# Patient Record
Sex: FEMALE | Race: WHITE | Employment: FULL TIME | URBAN - METROPOLITAN AREA
[De-identification: names, ages, dates, MRNs, and addresses within clinical notes are randomized per-mention and may not be internally consistent; named-entity substitution may affect disease eponyms.]

---

## 2017-02-24 ENCOUNTER — OFFICE VISIT (OUTPATIENT)
Dept: FAMILY MEDICINE CLINIC | Age: 57
End: 2017-02-24

## 2017-02-24 VITALS
HEIGHT: 67 IN | WEIGHT: 233 LBS | OXYGEN SATURATION: 97 % | DIASTOLIC BLOOD PRESSURE: 72 MMHG | HEART RATE: 75 BPM | RESPIRATION RATE: 18 BRPM | BODY MASS INDEX: 36.57 KG/M2 | TEMPERATURE: 98.2 F | SYSTOLIC BLOOD PRESSURE: 130 MMHG

## 2017-02-24 DIAGNOSIS — E78.00 HYPERCHOLESTEROLEMIA: ICD-10-CM

## 2017-02-24 DIAGNOSIS — E11.9 DIABETES MELLITUS TYPE 2, NONINSULIN DEPENDENT (HCC): Primary | ICD-10-CM

## 2017-02-24 PROBLEM — M77.50 BONE SPUR OF FOOT: Status: ACTIVE | Noted: 2017-02-24

## 2017-02-24 RX ORDER — GLUCOSAMINE SULFATE 1500 MG
1000 POWDER IN PACKET (EA) ORAL
COMMUNITY
End: 2019-01-09

## 2017-02-24 RX ORDER — LANOLIN ALCOHOL/MO/W.PET/CERES
1000 CREAM (GRAM) TOPICAL DAILY
COMMUNITY
End: 2017-08-17

## 2017-02-24 NOTE — PATIENT INSTRUCTIONS
Diabetes Foot Health: Care Instructions  Your Care Instructions    When you have diabetes, your feet need extra care and attention. Diabetes can damage the nerve endings and blood vessels in your feet, making you less likely to notice when your feet are injured. Diabetes also limits your body's ability to fight infection and get blood to areas that need it. If you get a minor foot injury, it could become an ulcer or a serious infection. With good foot care, you can prevent most of these problems. Caring for your feet can be quick and easy. Most of the care can be done when you are bathing or getting ready for bed. Follow-up care is a key part of your treatment and safety. Be sure to make and go to all appointments, and call your doctor if you are having problems. Its also a good idea to know your test results and keep a list of the medicines you take. How can you care for yourself at home? · Keep your blood sugar close to normal by watching what and how much you eat, monitoring blood sugar, taking medicines if prescribed, and getting regular exercise. · Do not smoke. Smoking affects blood flow and can make foot problems worse. If you need help quitting, talk to your doctor about stop-smoking programs and medicines. These can increase your chances of quitting for good. · Eat a diet that is low in fats. High fat intake can cause fat to build up in your blood vessels and decrease blood flow. · Inspect your feet daily for blisters, cuts, cracks, or sores. If you cannot see well, use a mirror or have someone help you. · Take care of your feet:  [facility] your feet every day. Use warm (not hot) water. Check the water temperature with your wrists or other part of your body, not your feet. ¨ Dry your feet well. Pat them dry. Do not rub the skin on your feet too hard. Dry well between your toes. If the skin on your feet stays moist, bacteria or a fungus can grow, which can lead to infection.   ¨ Keep your skin soft. Use moisturizing skin cream to keep the skin on your feet soft and prevent calluses and cracks. But do not put the cream between your toes, and stop using any cream that causes a rash. ¨ Clean underneath your toenails carefully. Do not use a sharp object to clean underneath your toenails. Use the blunt end of a nail file or other rounded tool. ¨ Trim and file your toenails straight across to prevent ingrown toenails. Use a nail clipper, not scissors. Use an emery board to smooth the edges. · Change socks daily. Socks without seams are best, because seams often rub the feet. You can find socks for people with diabetes from specialty catalogs. · Look inside your shoes every day for things like gravel or torn linings, which could cause blisters or sores. · Buy shoes that fit well:  ¨ Look for shoes that have plenty of space around the toes. This helps prevent bunions and blisters. ¨ Try on shoes while wearing the kind of socks you will usually wear with the shoes. ¨ Avoid plastic shoes. They may rub your feet and cause blisters. Good shoes should be made of materials that are flexible and breathable, such as leather or cloth. ¨ Break in new shoes slowly by wearing them for no more than an hour a day for several days. Take extra time to check your feet for red areas, blisters, or other problems after you wear new shoes. · Do not go barefoot. Do not wear sandals, and do not wear shoes with very thin soles. Thin soles are easy to puncture. They also do not protect your feet from hot pavement or cold weather. · Have your doctor check your feet during each visit. If you have a foot problem, see your doctor. Do not try to treat an early foot problem at home. Home remedies or treatments that you can buy without a prescription (such as corn removers) can be harmful. · Always get early treatment for foot problems. A minor irritation can lead to a major problem if not properly cared for early.   When should you call for help? Call your doctor now or seek immediate medical care if:  · You have a foot sore, an ulcer or break in the skin that is not healing after 4 days, bleeding corns or calluses, or an ingrown toenail. · You have blue or black areas, which can mean bruising or blood flow problems. · You have peeling skin or tiny blisters between your toes or cracking or oozing of the skin. · You have a fever for more than 24 hours and a foot sore. · You have new numbness or tingling in your feet that does not go away after you move your feet or change positions. · You have unexplained or unusual swelling of the foot or ankle. Watch closely for changes in your health, and be sure to contact your doctor if:  · You cannot do proper foot care. Where can you learn more? Go to http://jose f-mason.info/. Enter A739 in the search box to learn more about \"Diabetes Foot Health: Care Instructions. \"  Current as of: May 23, 2016  Content Version: 11.1  © 8353-4155 Healthwise, Incorporated. Care instructions adapted under license by [employer] (which disclaims liability or warranty for this information). If you have questions about a medical condition or this instruction, always ask your healthcare professional. [address] any warranty or liability for your use of this information.

## 2017-02-24 NOTE — PROGRESS NOTES
Chief Complaint   Patient presents with    Cholesterol Problem     fasting today    Diabetes       Reviewed Record in preparation for visit and have obtained necessary documentation. Identified pt with two pt identifiers (Name @ )    Health Maintenance Due   Topic    FOBT Q 1 YEAR AGE 50-75     PAP AKA CERVICAL CYTOLOGY     BREAST CANCER SCRN MAMMOGRAM     INFLUENZA AGE 9 TO ADULT     MICROALBUMIN Q1     EYE EXAM RETINAL OR DILATED Q1     HEMOGLOBIN A1C Q6M          1. Have you been to the ER, urgent care clinic since your last visit? Hospitalized since your last visit? No    2. Have you seen or consulted any other health care providers outside of the [address] [place] [address] since your last visit? Include any pap smears or colon screening.  No

## 2017-02-24 NOTE — PROGRESS NOTES
HISTORY OF PRESENT ILLNESS  Dexter Olguin is a 60 y.o. female. HPI  Fasting follow up diabetes, cholesterol, medication and lab check. Doing well on current medication     Trying to do better w/ diet in general (cutting back on carbs the past 2 months); in Dec 2016 she did a 10 day detox (whole grains, low carb, natural) and felt great and her BS's were better than ever. After that she wasn't as strict, but still just trying to do better w/ carbs. Still not motivated to exercise. Checks her BS's once every day. Over the past 2 months, her fasting sugars in the AM have been: 124, 144, 123, 119, 108, 129, 105, 107, 142, 130, 132, 95, 116, 103, 119, 93, 99, 108, 104, 103, 112, 101, 99  Most recent 14 day avg is 130 and 30 day avg 125. Review of Systems   Constitutional: Negative. Respiratory: Negative. Cardiovascular: Negative. Genitourinary: Negative. Neurological: Negative for dizziness. Slight pain and numbness right 3rd toe. No swelling. No tingling.         Problem List  Date Reviewed: 2/24/2017          Codes Class Noted    Bone spurs of feet  ICD-10-CM: M77.50  ICD-9-CM: 726.91  2/24/2017    Overview Signed 2/24/2017 10:56 AM by Katelynn Devlin MD     Podiatry Dr Vinnie Mantilla             Diabetes mellitus type 2, noninsulin dependent ([facility] ICD-10-CM: E11.9  ICD-9-CM: 250.00  12/10/2015    Overview Signed 12/10/2015  2:37 PM by Katelynn Devlin MD     3/2001             Vitamin D deficiency ICD-10-CM: E55.9  ICD-9-CM: 268.9  12/11/2013    Overview Signed 12/11/2013  1:27 PM by Katelynn Devlin MD     12/2013               Postmenopause, LMP age ~ 43 yo, No HRT ICD-10-CM: Z78.0  ICD-9-CM: V49.81  12/5/2012        History of abnormal Pap smear, s/p Cryotherapy in her 29's ICD-10-CM: Z87.898  ICD-9-CM: V13.29  12/5/2012        Stress incontinence ICD-10-CM: N39.3  ICD-9-CM: Sivakumar Smith  12/5/2012    Overview Signed 12/5/2012  9:55 AM by Katelynn Devlin MD     Since child bearing years             Left sided sciatica ICD-10-CM: M54.32  ICD-9-CM: 724.3  12/5/2012    Overview Signed 12/5/2012 10:25 AM by Grady Brown MD     12/2012             S/p colonoscopy with polypectomy and diverticulosis 4/2010 ICD-10-CM: Z98.890  ICD-9-CM: V45.89  4/27/2010    Overview Signed 4/27/2010  9:16 AM by MD Dr Jo Mathur             Hiatal Hernia, Gastritis, mild; BX neg for H. Pylori 4/2010 ICD-10-CM: K29.70  ICD-9-CM: 535.50  4/27/2010    Overview Signed 4/27/2010  9:17 AM by MD Dr Jo Mathur             Right knee pain; damage at medial meniscus; 2010 ICD-10-CM: M25.561  ICD-9-CM: 719.46  4/27/2010    Overview Signed 4/27/2010  9:19 AM by MD Dr Carmela Mathur; P.T.              Left foot pain 2009; s/p CSI ICD-10-CM: N79.233  ICD-9-CM: 729.5  4/27/2010    Overview Signed 4/27/2010  9:20 AM by MD Dr Faye Mathur Enrique             Uterine fibroid ICD-10-CM: D25.9  ICD-9-CM: 218.9  Unknown        Meniere's disease ICD-10-CM: H81.09  ICD-9-CM: 386.00  Unknown    Overview Signed 5/24/2016 12:30 PM by Grady Brown MD     ENT ~ 2003, Dr Edy Ramsay             FH: melanoma ICD-10-CM: Z80.8  ICD-9-CM: V16.8  Unknown        Hypercholesterolemia ICD-10-CM: E78.00  ICD-9-CM: 272.0  Unknown        GERD (gastroesophageal reflux disease) ICD-10-CM: K21.9  ICD-9-CM: 530.81  Unknown            Past Surgical History:   Procedure Laterality Date    HX BREAST BIOPSY Right 6/2014, Alissa Fausto    Benign    HX BREAST LUMPECTOMY   1986    benign fibroadenoma right breast    [address]    Emergency for fetal distress    HX COLONOSCOPY  ~2010    Dr Jo Cannon; Normal except Diverticulosis, but f/u 5 yrs d/t Fhx    HX COLONOSCOPY  07/11/2016, Dr Osorio Fleeting    Polyps, Multiple Diverticulae, Internal Hemorrhoids; f/u 5 yrs    HX DILATION AND CURETTAGE  10/8/14    AUB & Uterine Polyp    HX ENDOSCOPY 16, EGD, Dr Brittany Bob    Hiatal Hernia, Grade 1 Esophagitis w/ reflux, Gastritis    HX LAP CHOLECYSTECTOMY      gallstones    HX TONSIL AND ADENOIDECTOMY  1981    HYSTEROSCOPY,W/ENDO BX  2014    AUB; Negative       OB History      Para Term  AB TAB SAB Ectopic Multiple Living    3 3                Obstetric Comments    Emergency  x 1; vaginal deliveries x 2; Previous Gyn: [facility]; new Gyn Dr Eddie Ramsay          Current Outpatient Prescriptions   Medication Sig    cyanocobalamin (VITAMIN B-12) 1,000 mcg tablet Take 1,000 mcg by mouth daily.  cholecalciferol (VITAMIN D3) 1,000 unit cap Take 1,000 Units by mouth three (3) days a week.  ACCU-CHEK [place] PLUS TEST STRP strip TEST BLOOD SUGAR ONCE A DAY AS DIRECTED    NOVOTWIST 32 gauge x \" ndle USE ONCE A DAY WITH VICTOZA    VICTOZA 3-FAUSTO 0.6 mg/0.1 mL (18 mg/3 mL) sub-q pen INJECT 1.8 MG (0.3 ML) BY SUBCUTANEOUS ROUTE DAILY.  metFORMIN (GLUCOPHAGE) 500 mg tablet TAKE 1 TABLET BY MOUTH TWICE A DAY WITH MEALS    krill-om-3-dha-epa-phospho-ast (MEGARED OMEGA-3 KRILL OIL) 1,000-230-60 mg cap Take 1 Cap by mouth daily. No current facility-administered medications for this visit.       Allergies   Allergen Reactions    Glipizide Other (comments)     Frequent hypoglycemia    Other Medication Diarrhea     Intolerant of >1000 mg of Metformin    Phentermine Other (comments)     Dizziness and \"crashes\"    Succinylcholine Other (comments)     Prolonged period of recovery following anesthesia     Social History     Social History    Marital status:      Spouse name: N/A    Number of children: 3    Years of education: N/A     Occupational History    Administrative work at [employer]    Smoking status: Former Smoker     Packs/day: 1.00     Years: 10.00     Types: Cigarettes     Quit date: 1991    Smokeless tobacco: Never Used    Alcohol use Yes Comment: 2 glasses wine maybe twice a week    Drug use: No    Sexual activity: Yes     Partners: Male     Other Topics Concern    Caffeine Concern No     occ coffee, mostly just on weekends    Weight Concern No     staying in the 230's-240's    Special Diet Yes     trying to do better w/ diet in general; in Dec 2016 she did a 10 day detox (whole grains, low carb, natural) and felt great and her BS's were better than ever    Exercise No     Social History Narrative     Immunization History   Administered Date(s) Administered    Influenza Vaccine 10/16/2014, 10/22/2015    Influenza Vaccine Split 09/01/2009, 11/01/2012    TD Vaccine 06/05/2012       Family History   Problem Relation Age of Onset    COPD Mother      smoker    Colon Polyps Mother 72    Osteoporosis Mother     Stroke Father     Dementia Father      Alzheimer's    Heart Disease Father      PVD    Cancer Sister      melanoma    Diabetes Maternal Grandmother     Cancer Maternal Grandfather      lung    Hypertension Brother      living in his 66's     Visit Vitals    /72 (BP 1 Location: Left arm, BP Patient Position: Sitting)    Pulse 75    Temp 98.2 °F (36.8 °C) (Oral)    Resp 18    Ht 5' 7\" (1.702 m)    Wt 233 lb (105.7 kg)    SpO2 97%    BMI 36.49 kg/m2       Physical Exam   Neck: Neck supple. Carotid bruit is not present. No thyromegaly present. Cardiovascular: Normal rate, regular rhythm, normal heart sounds and intact distal pulses. No murmur heard. Pulmonary/Chest: Effort normal and breath sounds normal. No respiratory distress. Abdominal: Soft. Bowel sounds are normal. She exhibits no distension and no mass. There is no tenderness. Musculoskeletal: She exhibits no edema or tenderness. Normal exam of toes. Neurological: Coordination normal.   Normal monofilament testing B feet. Pulses strong. Skin intact. Nails healthy. Skin: Skin is warm and dry. Vitals reviewed.       ASSESSMENT and PLAN ICD-10-CM ICD-9-CM    1. Diabetes mellitus type 2, noninsulin dependent (HCC) E11.9 250.00 ALT      AST      LIPID PANEL      METABOLIC PANEL, BASIC      HEMOGLOBIN A1C WITH EAG      MICROALBUMIN, UR, RAND W/ MICROALBUMIN/CREA RATIO       DIABETES FOOT EXAM, REFERRAL TO PODIATRY       DIABETES EYE EXAM   2. Hypercholesterolemia E78.00 272.0 LIPID PANEL     Fasting labs today    Reviewed diet, nutrition, exercise and weight control; Trying to do better w/ diet in general (cutting back on carbs the past 2 months); in Dec 2016 she did a 10 day detox (whole grains, low carb, natural) and felt great and her BS's were better than ever. After that she wasn't as strict, but still just trying to do better w/ carbs. Still not motivated to exercise.      Cardiovascular risk and specific lipid/LDL/BS/HgBA1c goals reviewed  Reviewed medications and side effects in detail   Further follow up & other recommendations pending review of labs as well

## 2017-02-25 LAB
ALBUMIN/CREAT UR: <5.9 MG/G CREAT (ref 0–30)
ALT SERPL-CCNC: 18 IU/L (ref 0–32)
AST SERPL-CCNC: 19 IU/L (ref 0–40)
BUN SERPL-MCNC: 15 MG/DL (ref 6–24)
BUN/CREAT SERPL: 20 (ref 9–23)
CALCIUM SERPL-MCNC: 9.1 MG/DL (ref 8.7–10.2)
CHLORIDE SERPL-SCNC: 105 MMOL/L (ref 96–106)
CHOLEST SERPL-MCNC: 182 MG/DL (ref 100–199)
CO2 SERPL-SCNC: 18 MMOL/L (ref 18–29)
CREAT SERPL-MCNC: 0.76 MG/DL (ref 0.57–1)
CREAT UR-MCNC: 51 MG/DL
EST. AVERAGE GLUCOSE BLD GHB EST-MCNC: 131 MG/DL
GLUCOSE SERPL-MCNC: 95 MG/DL (ref 65–99)
HBA1C MFR BLD: 6.2 % (ref 4.8–5.6)
HDLC SERPL-MCNC: 47 MG/DL
INTERPRETATION, 910389: NORMAL
LDLC SERPL CALC-MCNC: 101 MG/DL (ref 0–99)
MICROALBUMIN UR-MCNC: <3 UG/ML
POTASSIUM SERPL-SCNC: 4.4 MMOL/L (ref 3.5–5.2)
SODIUM SERPL-SCNC: 144 MMOL/L (ref 134–144)
TRIGL SERPL-MCNC: 172 MG/DL (ref 0–149)
VLDLC SERPL CALC-MCNC: 34 MG/DL (ref 5–40)

## 2017-03-23 NOTE — PROGRESS NOTES
Urine, liver, kidney normal  TG coming down nicely, best reading she has had, getting closer to goal <150. HDL goal >50  LDL went up slightly to 101, goal <100. Fasting BS and HgbA1c are improving nicely and the best readings she has had over the last several checks, great job! Keeping working on it, it is paying off! No medication changes at this time  Fasting follow up 6month.  RTC sooner prn

## 2017-03-28 NOTE — PROGRESS NOTES
Advised patient of results and recommendations, she will call back to schedule she doesn't have her calendar. She knows that she has to schedule in advance.

## 2017-06-19 RX ORDER — METFORMIN HYDROCHLORIDE 500 MG/1
TABLET ORAL
Qty: 60 TAB | Refills: 5 | Status: SHIPPED | OUTPATIENT
Start: 2017-06-19 | End: 2017-12-12 | Stop reason: SDUPTHER

## 2017-07-13 RX ORDER — LIRAGLUTIDE 6 MG/ML
INJECTION SUBCUTANEOUS
Qty: 27 SYRINGE | Refills: 2 | Status: SHIPPED | OUTPATIENT
Start: 2017-07-13 | End: 2018-05-07 | Stop reason: SDUPTHER

## 2017-08-05 ENCOUNTER — OFFICE VISIT (OUTPATIENT)
Dept: FAMILY MEDICINE CLINIC | Age: 57
End: 2017-08-05

## 2017-08-05 VITALS
WEIGHT: 238 LBS | HEIGHT: 67 IN | HEART RATE: 67 BPM | OXYGEN SATURATION: 93 % | SYSTOLIC BLOOD PRESSURE: 112 MMHG | DIASTOLIC BLOOD PRESSURE: 75 MMHG | BODY MASS INDEX: 37.35 KG/M2 | TEMPERATURE: 98.4 F | RESPIRATION RATE: 18 BRPM

## 2017-08-05 DIAGNOSIS — L23.7 POISON IVY DERMATITIS: Primary | ICD-10-CM

## 2017-08-05 RX ORDER — TRIAMCINOLONE ACETONIDE 1 MG/G
OINTMENT TOPICAL 2 TIMES DAILY
Qty: 30 G | Refills: 0 | Status: SHIPPED | OUTPATIENT
Start: 2017-08-05 | End: 2017-08-05 | Stop reason: SDUPTHER

## 2017-08-05 RX ORDER — TRIAMCINOLONE ACETONIDE 1 MG/G
OINTMENT TOPICAL 2 TIMES DAILY
Qty: 30 G | Refills: 0 | Status: SHIPPED | OUTPATIENT
Start: 2017-08-05 | End: 2018-04-19 | Stop reason: ALTCHOICE

## 2017-08-05 NOTE — MR AVS SNAPSHOT
Visit Information Date & Time Provider Department Dept. Phone Encounter #  
 8/5/2017  3:30 PM Dilshadkimmie Fraser, [address] [facility] [phone] [id number] Your Appointments 8/17/2017  8:15 AM  
ROUTINE CARE with Diane Mckinnon MD  
[facility]) Appt Note: fasting follow up appointment [phone]  
[phone]  
  
   
 [address] [phone] Upcoming Health Maintenance Date Due FOBT Q 1 YEAR AGE 50-75 8/20/2008 EYE EXAM RETINAL OR DILATED Q1 2/22/2017 INFLUENZA AGE 9 TO ADULT 8/1/2017 HEMOGLOBIN A1C Q6M 8/24/2017 FOOT EXAM Q1 2/24/2018 MICROALBUMIN Q1 2/24/2018 LIPID PANEL Q1 2/24/2018 BREAST CANCER SCRN MAMMOGRAM 2/24/2019 PAP AKA CERVICAL CYTOLOGY 2/24/2020 DTaP/Tdap/Td series (2 - Td) 9/22/2026 Allergies as of 8/5/2017  Review Complete On: 8/5/2017 By: Ezio Viramontes MD  
  
 Severity Noted Reaction Type Reactions Glipizide  02/07/2012    Other (comments) Frequent hypoglycemia Other Medication  05/22/2014    Diarrhea Intolerant of >1000 mg of Metformin Phentermine  05/24/2016    Other (comments) Dizziness and \"crashes\" Succinylcholine  03/16/2010    Other (comments) Prolonged period of recovery following anesthesia Current Immunizations  Reviewed on 12/10/2015 Name Date Influenza Vaccine 10/22/2015, 10/16/2014 Influenza Vaccine Split 11/1/2012, 9/1/2009 TD Vaccine 6/5/2012 Not reviewed this visit You Were Diagnosed With   
  
 Codes Comments Poison ivy dermatitis    -  Primary ICD-10-CM: L23.7 ICD-9-CM: 692.6 Vitals BP Pulse Temp Resp Height(growth percentile) Weight(growth percentile) 112/75 (BP 1 Location: Left arm, BP Patient Position: Sitting) 67 98.4 °F (36.9 °C) (Oral) 18 5' 7\" (1.702 m) 238 lb (108 kg) SpO2 BMI OB Status Smoking Status 93% 37.28 kg/m2 Postmenopausal Former Smoker BMI and BSA Data Body Mass Index Body Surface Area  
 37.28 kg/m 2 2.26 m 2 Preferred Pharmacy Pharmacy Name Phone [facility][id number] Valerie Suárez, [address] [place] [address] [phone] Your Updated Medication List  
  
   
This list is accurate as of: 8/5/17  4:08 PM.  Always use your most recent med list.  
  
  
  
  
 ACCU-CHEK [place] PLUS TEST STRP strip Generic drug:  glucose blood VI test strips TEST BLOOD SUGAR ONCE A DAY AS DIRECTED MEGARED OMEGA-3 KRILL OIL 1,000-230-60 mg Cap Generic drug:  krill-om-3-dha-epa-phospho-ast  
Take 1 Cap by mouth daily. metFORMIN 500 mg tablet Commonly known as:  GLUCOPHAGE  
TAKE 1 TABLET BY MOUTH TWICE A DAY WITH MEALS  
  
 NOVOTWIST 32 gauge x 1/5\" Ndle Generic drug:  pen needle, diabetic USE ONCE A DAY WITH VICTOZA  
  
 triamcinolone acetonide 0.1 % ointment Commonly known as:  KENALOG Apply  to affected area two (2) times a day. use thin layer VICTOZA 3-FAUSTO 0.6 mg/0.1 mL (18 mg/3 mL) sub-q pen Generic drug:  Liraglutide INJECT 1.8 MG (0.3 ML) BY SUBCUTANEOUS ROUTE DAILY. VITAMIN B-12 1,000 mcg tablet Generic drug:  cyanocobalamin Take 1,000 mcg by mouth daily. VITAMIN D3 1,000 unit Cap Generic drug:  cholecalciferol Take 1,000 Units by mouth three (3) days a week. Prescriptions Sent to Pharmacy Refills  
 triamcinolone acetonide (KENALOG) 0.1 % ointment 0 Sig: Apply  to affected area two (2) times a day. use thin layer Class: Normal  
 Pharmacy: [facility]/pharmacy [address], [address] [place] [address] Ph #: [phone] Route: Topical  
  
Patient Instructions Poison [place], [place], and [place]: Care Instructions Your Care Instructions Poison ivy, poison oak, and poison sumac are plants that can cause a skin rash upon contact. The red, itchy rash often shows up in lines or streaks and may cause fluid-filled blisters or large, raised hives. The rash is caused by an allergic reaction to an oil in poison ivy, oak, and sumac. The rash may occur when you touch the plant or when you touch clothing, pet fur, sporting gear, gardening tools, or other objects that have come in contact with one of these plants. You cannot catch or spread the rash, even if you touch it or the blister fluid, because the plant oil will already have been absorbed or washed off the skin. The rash may seem to be spreading, but either it is still developing from earlier contact or you have touched something that still has the plant oil on it. Follow-up care is a key part of your treatment and safety. Be sure to make and go to all appointments, and call your doctor if you are having problems. It's also a good idea to know your test results and keep a list of the medicines you take. How can you care for yourself at home? · If your doctor prescribed a cream, use it as directed. If your doctor prescribed medicine, take it exactly as prescribed. Call your doctor if you think you are having a problem with your medicine. · Use cold, wet cloths to reduce itching. · Keep cool, and stay out of the sun. · Leave the rash open to the air. · Wash all clothing or other things that may have come in contact with the plant oil. · Avoid most lotions and ointments until the rash heals. Calamine lotion may help relieve symptoms of a plant rash. Use it 3 or 4 times a day. To prevent poison ivy exposure If you know that you will be near poison ivy, oak, or sumac, you can try these options: · Use a product designed to help prevent plant oil from getting on the skin. These products, such as Ivy X Pre-Contact Skin Solution, come in lotions, sprays, or towelettes. You put the product on your skin right before you go outdoors. · If you did not use a preventive product and you have had contact with plant oil, clean it off your skin as soon as possible. Use a product such as Tecnu Original Outdoor Skin Cleanser. These products can also be used to clean plant oil from clothing or tools. When should you call for help? Call your doctor now or seek immediate medical care if: 
· Your rash gets worse, and you start to feel bad and have a fever, a stiff neck, nausea, and vomiting. · You have signs of infection, such as: 
¨ Increased pain, swelling, warmth, or redness. ¨ Red streaks leading from the rash. ¨ Pus draining from the rash. ¨ A fever. Watch closely for changes in your health, and be sure to contact your doctor if: 
· You have new blisters or bruises, or the rash spreads and looks like a sunburn. · The rash gets worse, or it comes back after nearly disappearing. · You think a medicine you are using is making your rash worse. · Your rash does not clear up after 1 to 2 weeks of home treatment. · You have joint aches or body aches with your rash. Where can you learn more? Go to http://jose f-mason.info/. Enter P827 in the search box to learn more about \"Poison [place], [place], and [place]: Care Instructions. \" Current as of: October 13, 2016 Content Version: 11.3 © 0856-4261 [employer]. Care instructions adapted under license by [employer] (which disclaims liability or warranty for this information). If you have questions about a medical condition or this instruction, always ask your healthcare professional. [place] [address] any warranty or liability for your use of this information. Introducing [facility]! Dear Dank Sarabia: Thank you for requesting a [email or web address] account. Our records indicate that you already have an active [email or web address] account. You can access your account anytime at [email or web address] Did you know that you can access your hospital and ER discharge instructions at any time in [employer]? You can also review all of your test results from your hospital stay or ER visit. Additional Information If you have questions, please visit the Frequently Asked Questions section of the [employer] website at [email or web address]. [email or web address]/. Remember, [employer] is NOT to be used for urgent needs. For medical emergencies, dial 911. Now available from your iPhone and Android! Please provide this summary of care documentation to your next provider. Your primary care clinician is listed as RODRIGUEZ MARSHALL. If you have any questions after today's visit, please call [phone].

## 2017-08-05 NOTE — PROGRESS NOTES
Chief Complaint   Patient presents with    Rash     right and left leg     1. Have you been to the ER, urgent care clinic since your last visit? Hospitalized since your last visit? No    2. Have you seen or consulted any other health care providers outside of the [address] [place] [address] since your last visit? Include any pap smears or colon screening.  No

## 2017-08-05 NOTE — PROGRESS NOTES
Omer Almaraz [address] [facility] Maggy. Ruthie, [address]  [phone]             Date of visit: 8/5/2017   Subjective:      History obtained from:  the patient. Taty Pérez is a 60 y.o. female who presents today for very itchy rash on bilateral posterior thighs, for about a week. Was on right, then recently on left. Thinks the right one still getting bigger. Does remind her of previous poison ivy but not sure where exposed. Had done some outside walking/sitting before this started. Also had traveled, wondered if she got it from toilet seat. No new products on skin      Patient Active Problem List    Diagnosis Date Noted    Bone spurs of feet  02/24/2017    Diabetes mellitus type 2, noninsulin dependent ([facility] [address] 12/10/2015    Vitamin D deficiency 12/11/2013   Carmelo Pierre, LMP age ~ 43 yo, No HRT 12/05/2012    History of abnormal Pap smear, s/p Cryotherapy in her 30's 12/05/2012    Stress incontinence 12/05/2012    Left sided sciatica 12/05/2012    S/p colonoscopy with polypectomy and diverticulosis 4/2010 04/27/2010    Hiatal Hernia, Gastritis, mild; BX neg for H. Pylori 4/2010 04/27/2010    Right knee pain; damage at medial meniscus; 2010 04/27/2010    Left foot pain 2009; s/p CSI 04/27/2010    Uterine fibroid     Meniere's disease     FH: melanoma     Hypercholesterolemia     GERD (gastroesophageal reflux disease)      Current Outpatient Prescriptions   Medication Sig Dispense Refill    triamcinolone acetonide (KENALOG) 0.1 % ointment Apply  to affected area two (2) times a day. use thin layer 30 g 0    VICTOZA 3-FAUSTO 0.6 mg/0.1 mL (18 mg/3 mL) sub-q pen INJECT 1.8 MG (0.3 ML) BY SUBCUTANEOUS ROUTE DAILY. 27 Syringe 2    metFORMIN (GLUCOPHAGE) 500 mg tablet TAKE 1 TABLET BY MOUTH TWICE A DAY WITH MEALS 60 Tab 5    cyanocobalamin (VITAMIN B-12) 1,000 mcg tablet Take 1,000 mcg by mouth daily.       ACCU-CHEK [place] PLUS TEST STRP strip TEST BLOOD SUGAR ONCE A DAY AS DIRECTED 100 Strip 4    NOVOTWIST 32 gauge x 1/5\" ndle USE ONCE A DAY WITH VICTOZA 100 Pen Needle 3    krill-om-3-dha-epa-phospho-ast (MEGARED OMEGA-3 KRILL OIL) 1,000-230-60 mg cap Take 1 Cap by mouth daily.  cholecalciferol (VITAMIN D3) 1,000 unit cap Take 1,000 Units by mouth three (3) days a week.        Allergies   Allergen Reactions    Glipizide Other (comments)     Frequent hypoglycemia    Other Medication Diarrhea     Intolerant of >1000 mg of Metformin    Phentermine Other (comments)     Dizziness and \"crashes\"    Succinylcholine Other (comments)     Prolonged period of recovery following anesthesia     Past Medical History:   Diagnosis Date    Bone spurs of feet  2/24/2017    Podiatry Dr Lesli Bo [facility])     Diabetes mellitus type 2, noninsulin dependent ([facility] [address] 12/10/2015    3/2001    Diabetes mellitus, Type 2      Diabetes mellitus, type 2 March 2001     Diverticulosis age 26    FH: melanoma     also FH: BCC    GERD (gastroesophageal reflux disease)     Gestational diabetes 1998    Hemorrhoids     Hiatal Hernia, Gastritis, mild; BX neg for H. Pylori 4/2010 4/27/2010    History of abnormal Pap smear, s/p Cryotherapy in her 30's 12/5/2012    Hx of colonoscopy 7/11/2016    [facility] Dr. Alejandra Vera Hypercholesterolemia     Left foot pain 2009; s/p CSI 4/27/2010    Left sided sciatica 12/5/2012    Meniere's disease     Postmenopause, LMP age ~ 43 yo, No HRT 12/5/2012    Right knee pain; damage at medial meniscus; 2010 4/27/2010    S/p colonoscopy with polypectomy 4/2010 4/27/2010    S/p colonoscopy with polypectomy and diverticulosis 4/2010 4/27/2010    Stress fracture foot     left    Stress incontinence 12/5/2012    Uterine fibroid     Vitamin D deficiency 12/11/2013 12/2013      Past Surgical History:   Procedure Laterality Date    HX BREAST BIOPSY Right 6/2014, Lisa Wiseman    Benign    HX BREAST LUMPECTOMY   1986    benign fibroadenoma right breast    [address]    Emergency for fetal distress    HX COLONOSCOPY  ~2010    Dr Jolly Brady; Normal except Diverticulosis, but f/u 5 yrs d/t Fhx    HX COLONOSCOPY  07/11/2016, Dr Jolly Brady    Polyps, Multiple Diverticulae, Internal Hemorrhoids; f/u 5 yrs    HX DILATION AND CURETTAGE  10/8/14    AUB & Uterine Polyp    HX ENDOSCOPY  7-11-16, EGD, Dr Jolly Brady    Hiatal Hernia, Grade 1 Esophagitis w/ reflux, Gastritis    HX LAP CHOLECYSTECTOMY  1998    gallstones    HX TONSIL AND ADENOIDECTOMY  1981    HYSTEROSCOPY,W/ENDO BX  5/2014    AUB; Negative     Family History   Problem Relation Age of Onset   [facility] COPD Mother      smoker    Colon Polyps Mother 72    Osteoporosis Mother     Stroke Father     Dementia Father      Alzheimer's    Heart Disease Father      PVD    Cancer Sister      melanoma    Diabetes Maternal Grandmother     Cancer Maternal Grandfather      lung    Hypertension Brother      living in his 66's     Social History   Substance Use Topics    Smoking status: Former Smoker     Packs/day: 1.00     Years: 10.00     Types: Cigarettes     Quit date: 4/27/1991    Smokeless tobacco: Never Used    Alcohol use Yes      Comment: 2 glasses wine maybe twice a week      Social History     Social History Narrative        Review of Systems  Gen: denies fever      Objective:     Vitals:    08/05/17 1541   BP: 112/75   Pulse: 67   Resp: 18   Temp: 98.4 °F (36.9 °C)   TempSrc: Oral   SpO2: 93%   Weight: 238 lb (108 kg)   Height: 5' 7\" (1.702 m)     Body mass index is 37.28 kg/(m^2). General: stated age, well developed, obese and in NAD  Skin:  Right posterior upper thigh with approx 1x4cm area of erythematous clustered blisters, 1cm area on left posterior thigh, symmetric to the right  Psych: alert and oriented to person, place, time and situation and Speech: appropriate quality, quantity and organization of sentences     Assessment/Plan:       ICD-10-CM ICD-9-CM    1.  Poison ivy dermatitis L23.7 692.6         Orders Placed This Encounter    DISCONTD: triamcinolone acetonide (KENALOG) 0.1 % ointment    triamcinolone acetonide (KENALOG) 0.1 % ointment       Most likely poison ivy she got when she sat down outside somewhere, thankfully a small patch and I think unlikely to get much worse after a week, but warned her it could continue to spread. Would stick with a steroid topical now but could call if needing oral. Discussed signs of infection, and Reviewed worrisome signs or symptoms for which to call. Discussed the diagnosis and plan and she expressed understanding. Follow-up Disposition:  Return if symptoms worsen or fail to improve.     Brandt Booker MD

## 2017-08-05 NOTE — PATIENT INSTRUCTIONS
Poison [place], and [place]: Care Instructions  Your Care Instructions    Poison ivy, poison oak, and poison sumac are plants that can cause a skin rash upon contact. The red, itchy rash often shows up in lines or streaks and may cause fluid-filled blisters or large, raised hives. The rash is caused by an allergic reaction to an oil in poison ivy, oak, and sumac. The rash may occur when you touch the plant or when you touch clothing, pet fur, sporting gear, gardening tools, or other objects that have come in contact with one of these plants. You cannot catch or spread the rash, even if you touch it or the blister fluid, because the plant oil will already have been absorbed or washed off the skin. The rash may seem to be spreading, but either it is still developing from earlier contact or you have touched something that still has the plant oil on it. Follow-up care is a key part of your treatment and safety. Be sure to make and go to all appointments, and call your doctor if you are having problems. It's also a good idea to know your test results and keep a list of the medicines you take. How can you care for yourself at home? · If your doctor prescribed a cream, use it as directed. If your doctor prescribed medicine, take it exactly as prescribed. Call your doctor if you think you are having a problem with your medicine. · Use cold, wet cloths to reduce itching. · Keep cool, and stay out of the sun. · Leave the rash open to the air. · Wash all clothing or other things that may have come in contact with the plant oil. · Avoid most lotions and ointments until the rash heals. Calamine lotion may help relieve symptoms of a plant rash. Use it 3 or 4 times a day. To prevent poison ivy exposure  If you know that you will be near poison ivy, oak, or sumac, you can try these options:  · Use a product designed to help prevent plant oil from getting on the skin.  These products, such as Ivy X Pre-Contact Skin Solution, come in lotions, sprays, or towelettes. You put the product on your skin right before you go outdoors. · If you did not use a preventive product and you have had contact with plant oil, clean it off your skin as soon as possible. Use a product such as [employer]ec[employer] Original Outdoor Skin Cleanser. These products can also be used to clean plant oil from clothing or tools. When should you call for help? Call your doctor now or seek immediate medical care if:  · Your rash gets worse, and you start to feel bad and have a fever, a stiff neck, nausea, and vomiting. · You have signs of infection, such as:  ¨ Increased pain, swelling, warmth, or redness. ¨ Red streaks leading from the rash. ¨ Pus draining from the rash. ¨ A fever. Watch closely for changes in your health, and be sure to contact your doctor if:  · You have new blisters or bruises, or the rash spreads and looks like a sunburn. · The rash gets worse, or it comes back after nearly disappearing. · You think a medicine you are using is making your rash worse. · Your rash does not clear up after 1 to 2 weeks of home treatment. · You have joint aches or body aches with your rash. Where can you learn more? Go to http://jose f-mason.info/. Enter V572 in the search box to learn more about \"Poison [place], [place], and [place]: Care Instructions. \"  Current as of: October 13, 2016  Content Version: 11.3  © 8618-3423 [employer]. Care instructions adapted under license by [employer] (which disclaims liability or warranty for this information). If you have questions about a medical condition or this instruction, always ask your healthcare professional. [place] [address] any warranty or liability for your use of this information.

## 2017-08-17 ENCOUNTER — OFFICE VISIT (OUTPATIENT)
Dept: FAMILY MEDICINE CLINIC | Age: 57
End: 2017-08-17

## 2017-08-17 VITALS
HEIGHT: 67 IN | WEIGHT: 238.6 LBS | DIASTOLIC BLOOD PRESSURE: 70 MMHG | HEART RATE: 79 BPM | TEMPERATURE: 98.9 F | RESPIRATION RATE: 14 BRPM | OXYGEN SATURATION: 97 % | SYSTOLIC BLOOD PRESSURE: 110 MMHG | BODY MASS INDEX: 37.45 KG/M2

## 2017-08-17 DIAGNOSIS — E78.00 HYPERCHOLESTEROLEMIA: ICD-10-CM

## 2017-08-17 DIAGNOSIS — E11.9 DIABETES MELLITUS TYPE 2, NONINSULIN DEPENDENT (HCC): Primary | ICD-10-CM

## 2017-08-17 DIAGNOSIS — Z11.59 NEED FOR HEPATITIS C SCREENING TEST: ICD-10-CM

## 2017-08-17 NOTE — PATIENT INSTRUCTIONS

## 2017-08-17 NOTE — PROGRESS NOTES
HISTORY OF PRESENT ILLNESS   HPI  Fasting follow up diabetes, hypercholesterolemia, labs and medication check. Overall has been getting along well and feeling pretty well in general.  She admits her eating habits have not been as good this summer. Checks BS's a few x a week fasting in the AM and runs 110's-120's. No hypoglycemia. Some occ loose stools on Metformin but she has historically been prone to constipation over the years, so this is not too bad of a thing. Also sometimes depends on what she eats. Not healthy lately but has included a lot more veggies this summer which may be c/t her more loose stools  No abd pain or N/V.       REVIEW OF SYMPTOMS     Review of Systems   Eyes:        Overdue diabetic eye exam, will schedule at [facility]   Respiratory: Negative. Cardiovascular: Negative. Gastrointestinal: Negative for abdominal pain, nausea and vomiting. Genitourinary: Negative.     Neurological: Negative.            PROBLEM LIST/MEDICAL HISTORY      Problem List  Date Reviewed: 8/17/2017          Codes Class Noted    Bone spurs of feet  ICD-10-CM: M77.50  ICD-9-CM: 726.91  2/24/2017    Overview Signed 2/24/2017 10:56 AM by Lo Farfan MD     Podiatry Dr Billy Nuñez             Diabetes mellitus type 2, noninsulin dependent ([address] ICD-10-CM: E11.9  ICD-9-CM: 250.00  12/10/2015    Overview Signed 12/10/2015  2:37 PM by Lo Farfan MD     3/2001             Vitamin D deficiency ICD-10-CM: E55.9  ICD-9-CM: 268.9  12/11/2013    Overview Signed 12/11/2013  1:27 PM by Lo Farfan MD     12/2013               Postmenopause, LMP age ~ 45 yo, No HRT ICD-10-CM: Z78.0  ICD-9-CM: V49.81  12/5/2012        History of abnormal Pap smear, s/p Cryotherapy in her 29's ICD-10-CM: Z87.898  ICD-9-CM: V13.29  12/5/2012        Stress incontinence ICD-10-CM: N39.3  ICD-9-CM: Nereida Dealudwig  12/5/2012    Overview Signed 12/5/2012  9:55 AM by Lo Farfan MD     Since child bearing years Left sided sciatica ICD-10-CM: M54.32  ICD-9-CM: 724.3  12/5/2012    Overview Signed 12/5/2012 10:25 AM by Tani Larios MD     12/2012             S/p colonoscopy with polypectomy and diverticulosis 4/2010 ICD-10-CM: Z98.890  ICD-9-CM: V45.89  4/27/2010    Overview Signed 4/27/2010  9:16 AM by MD Dr Shahida Mejias             Hiatal Hernia, Gastritis, mild; BX neg for H. Pylori 4/2010 ICD-10-CM: K29.70  ICD-9-CM: 535.50  4/27/2010    Overview Signed 4/27/2010  9:17 AM by MD Dr Shahida Mejias             Right knee pain; damage at medial meniscus; 2010 ICD-10-CM: M25.561  ICD-9-CM: 719.46  4/27/2010    Overview Signed 4/27/2010  9:19 AM by MD Dr Luis Miguel Mejias; P.T.              Left foot pain 2009; s/p CSI ICD-10-CM: Z84.424  ICD-9-CM: 729.5  4/27/2010    Overview Signed 4/27/2010  9:20 AM by MD Dr Raissa Mejias             Uterine fibroid ICD-10-CM: D25.9  ICD-9-CM: 218.9  Unknown        Meniere's disease ICD-10-CM: H81.09  ICD-9-CM: 386.00  Unknown    Overview Signed 5/24/2016 12:30 PM by Tani Larios MD     ENT ~ 2003, Dr Bee Arana             FH: melanoma ICD-10-CM: Z80.8  ICD-9-CM: V16.8  Unknown        Hypercholesterolemia ICD-10-CM: E78.00  ICD-9-CM: 272.0  Unknown        GERD (gastroesophageal reflux disease) ICD-10-CM: K21.9  ICD-9-CM: 530.81  Unknown                  PAST SURGICAL HISTORY       Past Surgical History:   Procedure Laterality Date    HX BREAST BIOPSY Right 6/2014, Fer Maxcy    Benign    HX BREAST LUMPECTOMY   1986    benign fibroadenoma right breast    [address]    Emergency for fetal distress    HX COLONOSCOPY  ~2010    Dr Shahida Olivarez; Normal except Diverticulosis, but f/u 5 yrs d/t Fhx    HX COLONOSCOPY  07/11/2016, Dr Shahida Olivarez    Polyps, Multiple Diverticulae, Internal Hemorrhoids; f/u 5 yrs    HX DILATION AND CURETTAGE  10/8/14    AUB & Uterine Polyp    HX ENDOSCOPY 7-11-16, EGD, Dr Jayna Escalante    Hiatal Hernia, Grade 1 Esophagitis w/ reflux, Gastritis    HX LAP CHOLECYSTECTOMY  1998    gallstones    HX TONSIL AND ADENOIDECTOMY  1981    HYSTEROSCOPY,W/ENDO BX  5/2014    AUB; Negative         MEDICATIONS      Current Outpatient Prescriptions   Medication Sig    triamcinolone acetonide (KENALOG) 0.1 % ointment Apply  to affected area two (2) times a day. use thin layer    VICTOZA 3-FAUSTO 0.6 mg/0.1 mL (18 mg/3 mL) sub-q pen INJECT 1.8 MG (0.3 ML) BY SUBCUTANEOUS ROUTE DAILY.  metFORMIN (GLUCOPHAGE) 500 mg tablet TAKE 1 TABLET BY MOUTH TWICE A DAY WITH MEALS    cholecalciferol (VITAMIN D3) 1,000 unit cap Take 1,000 Units by mouth three (3) days a week.  ACCU-CHEK [place] PLUS TEST STRP strip TEST BLOOD SUGAR ONCE A DAY AS DIRECTED    NOVOTWIST 32 gauge x 1/5\" ndle USE ONCE A DAY WITH VICTOZA    krill-om-3-dha-epa-phospho-ast (MEGARED OMEGA-3 KRILL OIL) 1,000-230-60 mg cap Take 1 Cap by mouth daily. Takes 2-3 x a week     No current facility-administered medications for this visit.            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    Smoking status: Former Smoker     Packs/day: 1.00     Years: 10.00     Types: Cigarettes     Quit date: 4/27/1991    Smokeless tobacco: Never Used    Alcohol use Yes      Comment: 2 glasses wine maybe twice a week    Drug use: No    Sexual activity: Yes     Partners: Male     Other Topics Concern    Caffeine Concern No     occ coffee, mostly just on weekends    Weight Concern No     staying in the 230's-240's    Special Diet No    Exercise No     Social History Narrative        IMMUNIZATIONS  Immunization History   Administered Date(s) Administered    Influenza Vaccine 10/16/2014, 10/22/2015    Influenza Vaccine Split 09/01/2009, 11/01/2012    TD Vaccine 06/05/2012         FAMILY HISTORY     Family History   Problem Relation Age of Onset    COPD Mother      smoker    Colon Polyps Mother 72    Osteoporosis Mother     Stroke Father     Dementia Father      Alzheimer's    Heart Disease Father      PVD    Cancer Sister      melanoma    Diabetes Maternal Grandmother     Cancer Maternal Grandfather      lung    Hypertension Brother      living in his 66's         VITALS     Visit Vitals    /70 (BP 1 Location: Right arm, BP Patient Position: Sitting)    Pulse 79    Temp 98.9 °F (37.2 °C) (Oral)    Resp 14    Ht 5' 7\" (1.702 m)    Wt 238 lb 9.6 oz (108.2 kg)    SpO2 97%    BMI 37.37 kg/m2          PHYSICAL EXAMINATION     Physical Exam   Constitutional: No distress. Neck: Neck supple. Carotid bruit is not present. Cardiovascular: Normal rate, regular rhythm and normal heart sounds. No murmur heard. Pulmonary/Chest: Effort normal and breath sounds normal.   Abdominal: Soft. There is no tenderness. Musculoskeletal: She exhibits no edema or tenderness. Vitals reviewed.              ASSESSMENT & PLAN       ICD-10-CM ICD-9-CM    1. Diabetes mellitus type 2, noninsulin dependent (HCC) S63.6 347.48 METABOLIC PANEL, COMPREHENSIVE      HEMOGLOBIN A1C WITH EAG      REFERRAL TO OPHTHALMOLOGY   2. Hypercholesterolemia E78.00 272.0 LIPID PANEL   3.  Need for hepatitis C screening test Z11.59 V73.89 HEPATITIS C AB   Fasting labs today  Reviewed diet, exercise and weight control  Cardiovascular risk and specific lipid/LDL goals reviewed  Reviewed medications and side effects in detail  Specific diabetic recommendations: annual eye examinations at Ophthalmology discussed, referral to [facility] done today  Further follow up & other recommendations pending review of labs as well  If all remains good and stable, follow up 6months

## 2017-08-17 NOTE — PROGRESS NOTES
1. Have you been to the ER, urgent care clinic since your last visit? Hospitalized since your last visit? [facility] 5/2017- panic attack. 2. Have you seen or consulted any other health care providers outside of the [address] [place] [address] since your last visit? Include any pap smears or colon screening. Dermatologist Dr. 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 Podiatrist Dr. Marilu Juárez for foot pain. Chief Complaint   Patient presents with    Diabetes     follow up    Cholesterol Problem     follow up              Fasting    Eye exam has not been done yet- patient will schedule appointment.

## 2017-08-17 NOTE — MR AVS SNAPSHOT
Visit Information Date & Time Provider Department Dept. Phone Encounter #  
 8/17/2017  8:15 AM [address],  [facility] [address] [phone] [id number] Upcoming Health Maintenance Date Due FOBT Q 1 YEAR AGE 50-75 8/20/2008 EYE EXAM RETINAL OR DILATED Q1 2/22/2017 INFLUENZA AGE 9 TO ADULT 8/1/2017 HEMOGLOBIN A1C Q6M 8/24/2017 FOOT EXAM Q1 2/24/2018 MICROALBUMIN Q1 2/24/2018 LIPID PANEL Q1 2/24/2018 BREAST CANCER SCRN MAMMOGRAM 2/24/2019 PAP AKA CERVICAL CYTOLOGY 2/24/2020 DTaP/Tdap/Td series (2 - Td) 9/22/2026 Allergies as of 8/17/2017  Review Complete On: 8/17/2017 By: [address], [place]  
  
 Severity Noted Reaction Type Reactions Glipizide  02/07/2012    Other (comments) Frequent hypoglycemia Other Medication  05/22/2014    Diarrhea Intolerant of >1000 mg of Metformin Phentermine  05/24/2016    Other (comments) Dizziness and \"crashes\" Succinylcholine  03/16/2010    Other (comments) Prolonged period of recovery following anesthesia Current Immunizations  Reviewed on 12/10/2015 Name Date Influenza Vaccine 10/22/2015, 10/16/2014 Influenza Vaccine Split 11/1/2012, 9/1/2009 TD Vaccine 6/5/2012 Not reviewed this visit You Were Diagnosed With   
  
 Codes Comments Diabetes mellitus type 2, noninsulin dependent ([facility]    -  Primary ICD-10-CM: E11.9 ICD-9-CM: 250.00 Hypercholesterolemia     ICD-10-CM: E78.00 ICD-9-CM: 272.0 Need for hepatitis C screening test     ICD-10-CM: Z11.59 
ICD-9-CM: V73.89 Vitals BP Pulse Temp Resp Height(growth percentile) Weight(growth percentile) 110/70 (BP 1 Location: Right arm, BP Patient Position: Sitting) 79 98.9 °F (37.2 °C) (Oral) 14 5' 7\" (1.702 m) 238 lb 9.6 oz (108.2 kg) SpO2 BMI OB Status Smoking Status 97% 37.37 kg/m2 Postmenopausal Former Smoker Vitals History BMI and BSA Data Body Mass Index Body Surface Area  
 37.37 kg/m 2 2.26 m 2 Preferred Pharmacy Pharmacy Name Phone [id number] Debbie Torres [address] [facility] [phone] Your Updated Medication List  
  
   
This list is accurate as of: 8/17/17  8:46 AM.  Always use your most recent med list.  
  
  
  
  
 ACCU-CHEK [place]ALL[place] PLUS TEST STRP strip Generic drug:  glucose blood VI test strips TEST BLOOD SUGAR ONCE A DAY AS DIRECTED PROSPER OMEGA-3 KRILL OIL 1,000-230-60 mg Cap Generic drug:  krill-om-3-dha-epa-phospho-ast  
Take 1 Cap by mouth daily. Takes 2-3 x a week  
  
 metFORMIN 500 mg tablet Commonly known as:  GLUCOPHAGE  
TAKE 1 TABLET BY MOUTH TWICE A DAY WITH MEALS  
  
 NOVOTWIST 32 gauge x 1/5\" Ndle Generic drug:  pen needle, diabetic USE ONCE A DAY WITH VICTOZA  
  
 triamcinolone acetonide 0.1 % ointment Commonly known as:  KENALOG Apply  to affected area two (2) times a day. use thin layer VICTOZA 3-FAUSTO 0.6 mg/0.1 mL (18 mg/3 mL) sub-q pen Generic drug:  Liraglutide INJECT 1.8 MG (0.3 ML) BY SUBCUTANEOUS ROUTE DAILY. VITAMIN D3 1,000 unit Cap Generic drug:  cholecalciferol Take 1,000 Units by mouth three (3) days a week. We Performed the Following HEMOGLOBIN A1C WITH EAG [71074 CPT(R)] HEPATITIS C AB [58105 CPT(R)] LIPID PANEL [21366 CPT(R)] METABOLIC PANEL, COMPREHENSIVE [50861 CPT(R)] REFERRAL TO OPHTHALMOLOGY [REF57 Custom] Comments:  
 Patient given information to schedule diabetic eye exam  
  
Referral Information Referral ID Referred By Referred To  
  
 [id number] Vallie Rock [facility] [address] [place], [address] Visits Status Start Date End Date 1 New Request 8/17/17 8/17/18  If your referral has a status of pending review or denied, additional information will be sent to support the outcome of this decision. Patient Instructions Learning About Diabetes Food Guidelines Your Care Instructions Meal planning is important to manage diabetes. It helps keep your blood sugar at a target level (which you set with your doctor). You don't have to eat special foods. You can eat what your family eats, including sweets once in a while. But you do have to pay attention to how often you eat and how much you eat of certain foods. You may want to work with a dietitian or a certified diabetes educator (CDE) to help you plan meals and snacks. A dietitian or CDE can also help you lose weight if that is one of your goals. What should you know about eating carbs? Managing the amount of carbohydrate (carbs) you eat is an important part of healthy meals when you have diabetes. Carbohydrate is found in many foods. · Learn which foods have carbs. And learn the amounts of carbs in different foods. ¨ Bread, cereal, pasta, and rice have about 15 grams of carbs in a serving. A serving is 1 slice of bread (1 ounce), ½ cup of cooked cereal, or 1/3 cup of cooked pasta or rice. ¨ Fruits have 15 grams of carbs in a serving. A serving is 1 small fresh fruit, such as an apple or orange; ½ of a banana; ½ cup of cooked or canned fruit; ½ cup of fruit juice; 1 cup of melon or raspberries; or 2 tablespoons of dried fruit. ¨ Milk and no-sugar-added yogurt have 15 grams of carbs in a serving. A serving is 1 cup of milk or 2/3 cup of no-sugar-added yogurt. ¨ Starchy vegetables have 15 grams of carbs in a serving. A serving is ½ cup of mashed potatoes or sweet potato; 1 cup winter squash; ½ of a small baked potato; ½ cup of cooked beans; or ½ cup cooked corn or green peas. · Learn how much carbs to eat each day and at each meal. A dietitian or CDE can teach you how to keep track of the amount of carbs you eat. This is called carbohydrate counting. · If you are not sure how to count carbohydrate grams, use the Plate Method to plan meals. It is a good, quick way to make sure that you have a balanced meal. It also helps you spread carbs throughout the day. ¨ Divide your plate by types of foods. Put non-starchy vegetables on half the plate, meat or other protein food on one-quarter of the plate, and a grain or starchy vegetable in the final quarter of the plate. To this you can add a small piece of fruit and 1 cup of milk or yogurt, depending on how many carbs you are supposed to eat at a meal. 
· Try to eat about the same amount of carbs at each meal. Do not \"save up\" your daily allowance of carbs to eat at one meal. 
· Proteins have very little or no carbs per serving. Examples of proteins are beef, chicken, turkey, fish, eggs, tofu, cheese, cottage cheese, and peanut butter. A serving size of meat is 3 ounces, which is about the size of a deck of cards. Examples of meat substitute serving sizes (equal to 1 ounce of meat) are 1/4 cup of cottage cheese, 1 egg, 1 tablespoon of peanut butter, and ½ cup of tofu. How can you eat out and still eat healthy? · Learn to estimate the serving sizes of foods that have carbohydrate. If you measure food at home, it will be easier to estimate the amount in a serving of restaurant food. · If the meal you order has too much carbohydrate (such as potatoes, corn, or baked beans), ask to have a low-carbohydrate food instead. Ask for a salad or green vegetables. · If you use insulin, check your blood sugar before and after eating out to help you plan how much to eat in the future. · If you eat more carbohydrate at a meal than you had planned, take a walk or do other exercise. This will help lower your blood sugar. What else should you know? · Limit saturated fat, such as the fat from meat and dairy products.  This is a healthy choice because people who have diabetes are at higher risk of heart disease. So choose lean cuts of meat and nonfat or low-fat dairy products. Use olive or canola oil instead of butter or shortening when cooking. · Don't skip meals. Your blood sugar may drop too low if you skip meals and take insulin or certain medicines for diabetes. · Check with your doctor before you drink alcohol. Alcohol can cause your blood sugar to drop too low. Alcohol can also cause a bad reaction if you take certain diabetes medicines. Follow-up care is a key part of your treatment and safety. Be sure to make and go to all appointments, and call your doctor if you are having problems. It's also a good idea to know your test results and keep a list of the medicines you take. Where can you learn more? Go to http://jose f-mason.info/. Enter Q072 in the search box to learn more about \"Learning About Diabetes Food Guidelines. \" Current as of: March 13, 2017 Content Version: 11.3 © 5861-9542 [employer]. Care instructions adapted under license by [employer] (which disclaims liability or warranty for this information). If you have questions about a medical condition or this instruction, always ask your healthcare professional. [place] [address] any warranty or liability for your use of this information. Introducing [facility]! Dear Shanna Marques: Thank you for requesting a [employer] account. Our records indicate that you already have an active [employer] account. You can access your account anytime at [email or web address]. [email or web address] Did you know that you can access your hospital and ER discharge instructions at any time in [employer]? You can also review all of your test results from your hospital stay or ER visit. Additional Information If you have questions, please visit the Frequently Asked Questions section of the [employer] website at [email or web address]. [email or web address]/. Remember, MyChart is NOT to be used for urgent needs. For medical emergencies, dial 911. Now available from your iPhone and Android! Please provide this summary of care documentation to your next provider. Your primary care clinician is listed as RODRIGUEZ MARSHALL. If you have any questions after today's visit, please call [phone].

## 2017-08-18 LAB
ALBUMIN SERPL-MCNC: 4.4 G/DL (ref 3.5–5.5)
ALBUMIN/GLOB SERPL: 1.8 {RATIO} (ref 1.2–2.2)
ALP SERPL-CCNC: 75 IU/L (ref 39–117)
ALT SERPL-CCNC: 23 IU/L (ref 0–32)
AST SERPL-CCNC: 20 IU/L (ref 0–40)
BILIRUB SERPL-MCNC: 0.3 MG/DL (ref 0–1.2)
BUN SERPL-MCNC: 14 MG/DL (ref 6–24)
BUN/CREAT SERPL: 17 (ref 9–23)
CALCIUM SERPL-MCNC: 9 MG/DL (ref 8.7–10.2)
CHLORIDE SERPL-SCNC: 103 MMOL/L (ref 96–106)
CHOLEST SERPL-MCNC: 191 MG/DL (ref 100–199)
CO2 SERPL-SCNC: 22 MMOL/L (ref 18–29)
CREAT SERPL-MCNC: 0.84 MG/DL (ref 0.57–1)
EST. AVERAGE GLUCOSE BLD GHB EST-MCNC: 137 MG/DL
GLOBULIN SER CALC-MCNC: 2.5 G/DL (ref 1.5–4.5)
GLUCOSE SERPL-MCNC: 129 MG/DL (ref 65–99)
HBA1C MFR BLD: 6.4 % (ref 4.8–5.6)
HCV AB S/CO SERPL IA: <0.1 S/CO RATIO (ref 0–0.9)
HDLC SERPL-MCNC: 45 MG/DL
INTERPRETATION, 910389: NORMAL
LDLC SERPL CALC-MCNC: 85 MG/DL (ref 0–99)
POTASSIUM SERPL-SCNC: 4.4 MMOL/L (ref 3.5–5.2)
PROT SERPL-MCNC: 6.9 G/DL (ref 6–8.5)
SODIUM SERPL-SCNC: 143 MMOL/L (ref 134–144)
TRIGL SERPL-MCNC: 306 MG/DL (ref 0–149)
VLDLC SERPL CALC-MCNC: 61 MG/DL (ref 5–40)

## 2017-09-04 ENCOUNTER — TELEPHONE (OUTPATIENT)
Dept: FAMILY MEDICINE CLINIC | Age: 57
End: 2017-09-04

## 2017-09-05 NOTE — TELEPHONE ENCOUNTER
Hep C negative, not infected  Liver, kidney normal  LDL improved and very good.   HDL 45, goal >50  TG have gone up significantly, at 306, goal <150  BS gone up to 123 and HgBA1c has crept up slightly as well from 6.2 to 6.4  Admitted her diet has not been good lately  Follow sugar free, low carb diet and get regular exercise at least 150 minutes per week  Try to lose 10-15 lbs  Cont Metformin bid, increase Krill oil to taking daily not just a few x a week  Fasting follow up 4-6 months

## 2017-12-12 RX ORDER — METFORMIN HYDROCHLORIDE 500 MG/1
TABLET ORAL
Qty: 60 TAB | Refills: 1 | Status: SHIPPED | OUTPATIENT
Start: 2017-12-12 | End: 2018-03-06 | Stop reason: SDUPTHER

## 2018-03-06 RX ORDER — METFORMIN HYDROCHLORIDE 500 MG/1
TABLET ORAL
Qty: 60 TAB | Refills: 1 | Status: SHIPPED | OUTPATIENT
Start: 2018-03-06 | End: 2018-06-04 | Stop reason: SDUPTHER

## 2018-03-19 ENCOUNTER — TELEPHONE (OUTPATIENT)
Dept: FAMILY MEDICINE CLINIC | Age: 58
End: 2018-03-19

## 2018-03-19 NOTE — TELEPHONE ENCOUNTER
Patient calling looking for advice on when she should get blood work, Her appointment with Dr. Airam Mcfarland on 3/21/18 was canceled due to doctor being out of the office. She states that blood work is normally ordered when she comes in for her appointment. She wants to know if she will be able to get an order in for blood work prior to appointment. Rescheduled May 24, but is requesting a call back if an appointment is open before. She is requesting for a message to be left.      Best call back# [phone]

## 2018-04-19 ENCOUNTER — OFFICE VISIT (OUTPATIENT)
Dept: FAMILY MEDICINE CLINIC | Age: 58
End: 2018-04-19

## 2018-04-19 VITALS
HEART RATE: 74 BPM | RESPIRATION RATE: 18 BRPM | WEIGHT: 241 LBS | TEMPERATURE: 98 F | DIASTOLIC BLOOD PRESSURE: 76 MMHG | SYSTOLIC BLOOD PRESSURE: 140 MMHG | OXYGEN SATURATION: 97 % | HEIGHT: 67 IN | BODY MASS INDEX: 37.83 KG/M2

## 2018-04-19 DIAGNOSIS — Z23 ENCOUNTER FOR IMMUNIZATION: ICD-10-CM

## 2018-04-19 DIAGNOSIS — E11.9 DIABETES MELLITUS TYPE 2, NONINSULIN DEPENDENT (HCC): Primary | ICD-10-CM

## 2018-04-19 DIAGNOSIS — E78.00 HYPERCHOLESTEROLEMIA: ICD-10-CM

## 2018-04-19 DIAGNOSIS — E66.01 SEVERE OBESITY (BMI 35.0-39.9) WITH COMORBIDITY (HCC): ICD-10-CM

## 2018-04-19 PROBLEM — M76.61 TENDONITIS, ACHILLES, RIGHT: Status: ACTIVE | Noted: 2018-04-19

## 2018-04-19 RX ORDER — DICLOFENAC SODIUM 10 MG/G
GEL TOPICAL
Refills: 1 | COMMUNITY
Start: 2018-02-09 | End: 2021-06-14

## 2018-04-19 NOTE — PROGRESS NOTES
Chief Complaint   Patient presents with    Diabetes     Fasting follow up    Cholesterol Problem     1. Have you been to the ER, urgent care clinic since your last visit? Hospitalized since your last visit? No    2. Have you seen or consulted any other health care providers outside of the [address] [place] [address] since your last visit? Include any pap smears or colon screening.    YES DR Becka López DPM

## 2018-04-19 NOTE — PATIENT INSTRUCTIONS

## 2018-04-19 NOTE — PROGRESS NOTES
HISTORY OF PRESENT ILLNESS   HPI  Fasting follow up diabetes, hypercholesterolemia, labs and medication check. Admits she has not been as healthy w/ diet and even worse while being in [facility] during spring break. She is trying to be more active and even got a Fit Bit a few months ago. Was doing even better w/ it up until her heel spurs started bothering her again. She is seeing her podiatrist routinely for this, most recently in Feb 2018. She is wearing an orthotic, doing the stretches/exercises she was given and it is starting to get a bit better. She is hopeful to get back on track a bit beter. Checks BS's a few x a month. Fasting in the , 144, 138, 135, 143, 142, 119, 140  Pre lunch 116 (2 hrs after breakfast)  2 hrs after lunch 116, 129, 124  Pre dinner 98  Bedtime 2 hrs after dinner 138  This        REVIEW OF SYMPTOMS     Review of Systems   Constitutional: Negative. Respiratory: Negative. Cardiovascular: Negative. Gastrointestinal: Negative. Genitourinary: Negative. Neurological: Negative.            PROBLEM LIST/MEDICAL HISTORY      Problem List  Date Reviewed: 4/19/2018          Codes Class Noted    Tendonitis, Achilles, right ICD-10-CM: M76.61  ICD-9-CM: 726.71  4/19/2018    Overview Signed 4/19/2018  9:24 AM by Kwabena Muse MD     2-2018: Dr Brenda Forbes, bone spur chipped             Severe obesity (BMI 35.0-39. 9) with comorbidity ([facility] ICD-10-CM: E66.01  ICD-9-CM: 278.01  4/19/2018        Bone spurs of feet  ICD-10-CM: M77.50  ICD-9-CM: 726.91  2/24/2017    Overview Signed 2/24/2017 10:56 AM by Kwabena Muse MD     Podiatry Dr Wendy Samson             Diabetes mellitus type 2, noninsulin dependent ([facility] ICD-10-CM: E11.9  ICD-9-CM: 250.00  12/10/2015    Overview Signed 12/10/2015  2:37 PM by Kwabena Muse MD     3/2001             Vitamin D deficiency ICD-10-CM: E55.9  ICD-9-CM: 268.9  12/11/2013    Overview Signed 12/11/2013  1:27 PM by Zaina Schumacher MD     12/2013               Postmenopause, LMP age ~ 43 yo, No HRT ICD-10-CM: Z78.0  ICD-9-CM: V49.81  12/5/2012        History of abnormal Pap smear, s/p Cryotherapy in her 27's ICD-10-CM: Z87.898  ICD-9-CM: V13.29  12/5/2012        Stress incontinence ICD-10-CM: N39.3  ICD-9-CM: Chyrl Res  12/5/2012    Overview Signed 12/5/2012  9:55 AM by Zaina Schumacher MD     Since child bearing years             Left sided sciatica ICD-10-CM: M54.32  ICD-9-CM: 724.3  12/5/2012    Overview Signed 12/5/2012 10:25 AM by Zaina Schumacher MD     12/2012             S/p colonoscopy with polypectomy and diverticulosis 4/2010 ICD-10-CM: Z98.890  ICD-9-CM: V45.89  4/27/2010    Overview Signed 4/27/2010  9:16 AM by MD Dr Esteban Mahajan Ala             Hiatal Hernia, Gastritis, mild; BX neg for H. Pylori 4/2010 ICD-10-CM: K29.70  ICD-9-CM: 535.50  4/27/2010    Overview Signed 4/27/2010  9:17 AM by MD Dr Esteban Mahajan Ala             Right knee pain; damage at medial meniscus; 2010 ICD-10-CM: M25.561  ICD-9-CM: 719.46  4/27/2010    Overview Signed 4/27/2010  9:19 AM by MD Dr Nancy Mahajan Ala; P.T.              Left foot pain 2009; s/p CSI ICD-10-CM: V53.543  ICD-9-CM: 729.5  4/27/2010    Overview Signed 4/27/2010  9:20 AM by MD Dr Yuni Mahajan Ala             Uterine fibroid ICD-10-CM: D25.9  ICD-9-CM: 218.9  Unknown        Meniere's disease ICD-10-CM: H81.09  ICD-9-CM: 386.00  Unknown    Overview Signed 5/24/2016 12:30 PM by Zaina Schumacher MD     ENT ~ 2003, Dr Jose Masterson             FH: melanoma ICD-10-CM: Z80.8  ICD-9-CM: V16.8  Unknown        Hypercholesterolemia ICD-10-CM: E78.00  ICD-9-CM: 272.0  Unknown        GERD (gastroesophageal reflux disease) ICD-10-CM: K21.9  ICD-9-CM: 530.81  Unknown                  PAST SURGICAL HISTORY       Past Surgical History:   Procedure Laterality Date    HX BREAST BIOPSY Right 6/2014, Marifer Werner    Benign    HX BREAST LUMPECTOMY   1986    benign fibroadenoma right breast    [address]     Emergency for fetal distress    HX COLONOSCOPY  ~2010    Dr Candace Fleming; Normal except Diverticulosis, but f/u 5 yrs d/t Fhx    HX COLONOSCOPY  07/11/2016, Dr Candace Fleming    Polyps, Multiple Diverticulae, Internal Hemorrhoids; f/u 5 yrs    HX DILATION AND CURETTAGE  10/8/14    AUB & Uterine Polyp    HX ENDOSCOPY  7-11-16, EGD, Dr Candace Fleming    Hiatal Hernia, Grade 1 Esophagitis w/ reflux, Gastritis    HX LAP CHOLECYSTECTOMY  1998    gallstones    HX TONSIL AND ADENOIDECTOMY  1981    HYSTEROSCOPY,W/ENDO BX  5/2014    AUB; Negative         MEDICATIONS      Current Outpatient Prescriptions   Medication Sig    diclofenac (VOLTAREN) 1 % gel 2 (TWO) GRAM FOUR TIMES DAILY    metFORMIN (GLUCOPHAGE) 500 mg tablet TAKE 1 TABLET BY MOUTH TWICE A DAY WITH MEALS    NOVOTWIST 32 gauge x 1/5\" ndle USE ONCE A DAY WITH VICTOZA    VICTOZA 3-FAUTSO 0.6 mg/0.1 mL (18 mg/3 mL) sub-q pen INJECT 1.8 MG (0.3 ML) BY SUBCUTANEOUS ROUTE DAILY.  cholecalciferol (VITAMIN D3) 1,000 unit cap Take 1,000 Units by mouth three (3) days a week.  ACCU-CHEK [place] PLUS TEST STRP strip TEST BLOOD SUGAR ONCE A DAY AS DIRECTED    krill-om-3-dha-epa-phospho-ast (MEGARED OMEGA-3 KRILL OIL) 1,000-230-60 mg cap Take 1 Cap by mouth daily. No current facility-administered medications for this visit.            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[employer] [employer] History Main Topics    Smoking status: Former Smoker     Packs/day: 1.00     Years: 10.00     Types: Cigarettes     Quit date: 4/27/1991    Smokeless tobacco: Never Used    Alcohol use Yes      Comment: 2 glasses wine maybe twice a week    Drug use: No    Sexual activity: Yes     Partners: Male     Other Topics Concern    Caffeine Concern No     occ coffee, mostly just on weekends    Weight Concern No     staying in the 230's-240's    Special Diet No    Exercise No     got Fit Bit : increased her steps alot until foot issues 2-2018: Dr Sandy Roberts History Narrative    Diabetes: Initial PPV23 4-2018 age 59; will need PCV 15 at age 71 yo and PPSV 21 again at 74 yo        IMMUNIZATIONS  Immunization History   Administered Date(s) Administered    Influenza Vaccine 10/16/2014, 10/22/2015, 11/01/2017    Influenza Vaccine Split 09/01/2009, 11/01/2012    Pneumococcal Polysaccharide (PPSV-23) 04/19/2018    TD Vaccine 06/05/2012         FAMILY HISTORY     Family History   Problem Relation Age of Onset    COPD Mother      smoker    Colon Polyps Mother 72    Osteoporosis Mother     Stroke Father     Dementia Father      Alzheimer's    Heart Disease Father      PVD    Cancer Sister      melanoma    Diabetes Maternal Grandmother     Cancer Maternal Grandfather      lung    Hypertension Brother      living in his 66's         VITALS     Visit Vitals    /76 (BP 1 Location: Left arm, BP Patient Position: Sitting)    Pulse 74    Temp 98 °F (36.7 °C) (Oral)    Resp 18    Ht 5' 7\" (1.702 m)    Wt 241 lb (109.3 kg)    SpO2 97%    BMI 37.75 kg/m2          PHYSICAL EXAMINATION     Physical Exam   Constitutional: No distress. Cardiovascular: Normal rate, regular rhythm and normal heart sounds. Pulmonary/Chest: Effort normal and breath sounds normal.   Abdominal: Soft. Bowel sounds are normal. She exhibits no distension. There is no tenderness.    Vitals reviewed. ASSESSMENT & PLAN   Diagnoses and all orders for this visit:    1. Diabetes mellitus type 2, noninsulin dependent ([facility]  Assessment & Plan:  Stable, based on history, physical exam and review of pertinent labs, studies and medications; meds reconciled; continue current treatment plan, lifestyle modifications recommended. Key Antihyperglycemic Medications             metFORMIN (GLUCOPHAGE) 500 mg tablet  (Taking) TAKE 1 TABLET BY MOUTH TWICE A DAY WITH MEALS    VICTOZA 3-FAUSTO 0.6 mg/0.1 mL (18 mg/3 mL) sub-q pen  (Taking) INJECT 1.8 MG (0.3 ML) BY SUBCUTANEOUS ROUTE DAILY. Other Key Diabetic Medications             krill-om-3-dha-epa-phospho-ast (MEGARED OMEGA-3 KRILL OIL) 1,000-230-60 mg cap  (Taking) Take 1 Cap by mouth daily. Lab Results   Component Value Date/Time    Hemoglobin A1c 6.4 08/17/2017 08:51 AM    Glucose 129 08/17/2017 08:51 AM    Creatinine 0.84 08/17/2017 08:51 AM    Microalb/Creat ratio (ug/mg creat.) <5.9 02/24/2017 11:02 AM    Cholesterol, total 191 08/17/2017 08:51 AM    HDL Cholesterol 45 08/17/2017 08:51 AM    LDL, calculated 85 08/17/2017 08:51 AM    Triglyceride 306 08/17/2017 08:51 AM     Diabetic Foot and Eye Exam HM Status   Topic Date Due    Eye Exam  Patient is scheduled to see her eye doctor [phone]    Diabetic Foot Care  Last seen by her podiatrist 2-2018       Orders:  -     HEMOGLOBIN A1C WITH EAG  -     MICROALBUMIN, UR, RAND W/ MICROALB/CREAT RATIO  -     METABOLIC PANEL, BASIC    2. Hypercholesterolemia  -     ALT  -     AST  -     LIPID PANEL  -     TSH 3RD GENERATION    3. Encounter for immunization  -     Pneumococcal polysaccharide vaccine, 23-valent, adult or immunosuppressed pt dose  -     NV IMMUNIZ ADMIN,1 SINGLE/COMB VAC/TOXOID    4. Severe obesity (BMI 35.0-39. 9) with comorbidity [facility])  Assessment & Plan:  Uncontrolled, based on history, physical exam and review of pertinent labs, studies and medications; meds reconciled; lifestyle modifications recommended. Key Obesity Meds             metFORMIN (GLUCOPHAGE) 500 mg tablet  (Taking) TAKE 1 TABLET BY MOUTH TWICE A DAY WITH MEALS    VICTOZA 3-FAUSTO 0.6 mg/0.1 mL (18 mg/3 mL) sub-q pen  (Taking) INJECT 1.8 MG (0.3 ML) BY SUBCUTANEOUS ROUTE DAILY. Fasting labs today  Reviewed diet, exercise and weight control  Cardiovascular risk and specific lipid/LDL/BS/HgBA1c goals reviewed  Reviewed medications and side effects in detail  Further follow up & other recommendations pending review of labs.  If all remains good and stable, follow up in 6months, sooner prn

## 2018-04-19 NOTE — MR AVS SNAPSHOT
[address] [facility] 
 
 
 [address] 
[phone] Patient: Kerri Tomlinson MRN: [id number] QOO:[id number][id number] Visit Information Date & Time Provider Department Dept. Phone Encounter #  
 4/19/2018  8:30 AM [address] [place] [address] [phone] [id number] Your Appointments 5/24/2018 10:00 AM  
ROUTINE CARE with [address] [place] [address] [place], [facility]) Appt Note: f/up appointment  12/30/2017; r/s, fuv, $25 CP, ctj 2/6/18; Spoke to  & patient will call back to reschedule 3/19/18 mbg.; r/s, fu diabetes arh 0cp/0pb 3/19/18  
 [address] [address]  
[phone]  
  
   
 [address] [address] Upcoming Health Maintenance Date Due  
 EYE EXAM RETINAL OR DILATED Q1 2/22/2017 LIPID PANEL Q1 8/17/2018 HEMOGLOBIN A1C Q6M 10/19/2018 BREAST CANCER SCRN MAMMOGRAM 2/24/2019 FOOT EXAM Q1 2/25/2019 MICROALBUMIN Q1 4/19/2019 PAP AKA CERVICAL CYTOLOGY 2/24/2020 COLONOSCOPY 7/11/2026 DTaP/Tdap/Td series (2 - Td) 9/22/2026 Allergies as of 4/19/2018  Review Complete On: 4/19/2018 By: [address] [place] [address] [place], [place]  
  
 Severity Noted Reaction Type Reactions Glipizide  02/07/2012    Other (comments) Frequent hypoglycemia Other Medication  05/22/2014    Diarrhea Intolerant of >1000 mg of Metformin Phentermine  05/24/2016    Other (comments) Dizziness and \"crashes\" Succinylcholine  03/16/2010    Other (comments) Prolonged period of recovery following anesthesia Current Immunizations  Reviewed on 12/10/2015 Name Date Influenza Vaccine 11/1/2017, 10/22/2015, 10/16/2014 Influenza Vaccine Split 11/1/2012, 9/1/2009 Pneumococcal Polysaccharide (PPSV-23)  Incomplete TD Vaccine 6/5/2012 Not reviewed this visit You Were Diagnosed With   
  
 Codes Comments Diabetes mellitus type 2, noninsulin dependent ([facility]    -  Primary ICD-10-CM: E11.9 ICD-9-CM: 250.00 Hypercholesterolemia     ICD-10-CM: E78.00 ICD-9-CM: 272.0 Encounter for immunization     ICD-10-CM: U59 ICD-9-CM: V03.89 Vitals BP Pulse Temp Resp Height(growth percentile) Weight(growth percentile) 140/76 (BP 1 Location: Left arm, BP Patient Position: Sitting) 74 98 °F (36.7 °C) (Oral) 18 5' 7\" (1.702 m) 241 lb (109.3 kg) SpO2 BMI OB Status Smoking Status 97% 37.75 kg/m2 Postmenopausal Former Smoker BMI and BSA Data Body Mass Index Body Surface Area 37.75 kg/m 2 2.27 m 2 Preferred Pharmacy Pharmacy Name Phone [id number] Tiffany Sahu, [address] [place] [address] [phone] Your Updated Medication List  
  
   
This list is accurate as of 4/19/18  9:47 AM.  Always use your most recent med list.  
  
  
  
  
 ACCU-CHEK DALLAS PLUS TEST STRP strip Generic drug:  glucose blood VI test strips TEST BLOOD SUGAR ONCE A DAY AS DIRECTED  
  
 diclofenac 1 % Gel Commonly known as:  VOLTAREN  
2 (TWO) GRAM FOUR TIMES DAILY MEGARED OMEGA-3 KRILL OIL 1,000-230-60 mg Cap Generic drug:  krill-om-3-dha-epa-phospho-ast  
Take 1 Cap by mouth daily. metFORMIN 500 mg tablet Commonly known as:  GLUCOPHAGE  
TAKE 1 TABLET BY MOUTH TWICE A DAY WITH MEALS  
  
 NOVOTWIST 32 gauge x 1/5\" Ndle Generic drug:  pen needle, diabetic USE ONCE A DAY WITH VICTOZA  
  
 VICTOZA 3-FAUSTO 0.6 mg/0.1 mL (18 mg/3 mL) Pnij Generic drug:  Liraglutide INJECT 1.8 MG (0.3 ML) BY SUBCUTANEOUS ROUTE DAILY. VITAMIN D3 1,000 unit Cap Generic drug:  cholecalciferol Take 1,000 Units by mouth three (3) days a week. We Performed the Following ALT [id number] CPT(R)] AST [id number] CPT(R)] HEMOGLOBIN A1C WITH EAG [36942 CPT(R)] LIPID PANEL [04891 CPT(R)] METABOLIC PANEL, BASIC [50915 CPT(R)] MICROALBUMIN, UR, RAND W/ MICROALB/CREAT RATIO [id number] CPT(R)] PNEUMOCOCCAL POLYSACCHARIDE VACCINE, 23-VALENT, ADULT OR IMMUNOSUPPRESSED PT DOSE, [56980 CPT(R)] MD IMMUNIZ ADMIN,1 SINGLE/COMB VAC/TOXOID [id number] CPT(R)] TSH 3RD GENERATION [45180 CPT(R)] Patient Instructions Learning About Meal Planning for Diabetes Why plan your meals? Meal planning can be a key part of managing diabetes. Planning meals and snacks with the right balance of carbohydrate, protein, and fat can help you keep your blood sugar at the target level you set with your doctor. You don't have to eat special foods. You can eat what your family eats, including sweets once in a while. But you do have to pay attention to how often you eat and how much you eat of certain foods. You may want to work with a dietitian or a certified diabetes educator. He or she can give you tips and meal ideas and can answer your questions about meal planning. This health professional can also help you reach a healthy weight if that is one of your goals. What plan is right for you? Your dietitian or diabetes educator may suggest that you start with the plate format or carbohydrate counting. The plate format The plate format is a simple way to help you manage how you eat. You plan meals by learning how much space each food should take on a plate. Using the plate format helps you spread carbohydrate throughout the day. It can make it easier to keep your blood sugar level within your target range. It also helps you see if you're eating healthy portion sizes. To use the plate format, you put non-starchy vegetables on half your plate. Add meat or meat substitutes on one-quarter of the plate. Put a grain or starchy vegetable (such as brown rice or a potato) on the final quarter of the plate.  You can add a small piece of fruit and some low-fat or fat-free milk or yogurt, depending on your carbohydrate goal for each meal. 
 Here are some tips for using the plate format: · Make sure that you are not using an oversized plate. A 9-inch plate is best. Many restaurants use larger plates. · Get used to using the plate format at home. Then you can use it when you eat out. · Write down your questions about using the plate format. Talk to your doctor, a dietitian, or a diabetes educator about your concerns. Carbohydrate counting With carbohydrate counting, you plan meals based on the amount of carbohydrate in each food. Carbohydrate raises blood sugar higher and more quickly than any other nutrient. It is found in desserts, breads and cereals, and fruit. It's also found in starchy vegetables such as potatoes and corn, grains such as rice and pasta, and milk and yogurt. Spreading carbohydrate throughout the day helps keep your blood sugar levels within your target range. Your daily amount depends on several things, including your weight, how active you are, which diabetes medicines you take, and what your goals are for your blood sugar levels. A registered dietitian or diabetes educator can help you plan how much carbohydrate to include in each meal and snack. A guideline for your daily amount of carbohydrate is: · 45 to 60 grams at each meal. That's about the same as 3 to 4 carbohydrate servings. · 15 to 20 grams at each snack. That's about the same as 1 carbohydrate serving. The Nutrition Facts label on packaged foods tells you how much carbohydrate is in a serving of the food. First, look at the serving size on the food label. Is that the amount you eat in a serving? All of the nutrition information on a food label is based on that serving size. So if you eat more or less than that, you'll need to adjust the other numbers. Total carbohydrate is the next thing you need to look for on the label. If you count carbohydrate servings, one serving of carbohydrate is 15 grams.  
For foods that don't come with labels, such as fresh fruits and vegetables, you'll need a guide that lists carbohydrate in these foods. Ask your doctor, dietitian, or diabetes educator about books or other nutrition guides you can use. If you take insulin, you need to know how many grams of carbohydrate are in a meal. This lets you know how much rapid-acting insulin to take before you eat. If you use an insulin pump, you get a constant rate of insulin during the day. So the pump must be programmed at meals to give you extra insulin to cover the rise in blood sugar after meals. When you know how much carbohydrate you will eat, you can take the right amount of insulin. Or, if you always use the same amount of insulin, you need to make sure that you eat the same amount of carbohydrate at meals. If you need more help to understand carbohydrate counting and food labels, ask your doctor, dietitian, or diabetes educator. How do you get started with meal planning? Here are some tips to get started: 
· Plan your meals a week at a time. Don't forget to include snacks too. · Use cookbooks or online recipes to plan several main meals. Plan some quick meals for busy nights. You also can double some recipes that freeze well. Then you can save half for other busy nights when you don't have time to cook. · Make sure you have the ingredients you need for your recipes. If you're running low on basic items, put these items on your shopping list too. · List foods that you use to make breakfasts, lunches, and snacks. List plenty of fruits and vegetables. · Post this list on the refrigerator. Add to it as you think of more things you need. · Take the list to the store to do your weekly shopping. Follow-up care is a key part of your treatment and safety. Be sure to make and go to all appointments, and call your doctor if you are having problems. It's also a good idea to know your test results and keep a list of the medicines you take. Where can you learn more? Go to http://jose f-mason.info/. Rachid Parikh in the search box to learn more about \"Learning About Meal Planning for Diabetes. \" Current as of: March 13, 2017 Content Version: 11.4 © 2629-7699 [employer]. Care instructions adapted under license by [employer] (which disclaims liability or warranty for this information). If you have questions about a medical condition or this instruction, always ask your healthcare professional. [address] any warranty or liability for your use of this information. Introducing [facility]! Dear Walker Carrero: Thank you for requesting a [employer] account. Our records indicate that you already have an active [employer] account. You can access your account anytime at [email or web address] Did you know that you can access your hospital and ER discharge instructions at any time in [employer]? You can also review all of your test results from your hospital stay or ER visit. Additional Information If you have questions, please visit the Frequently Asked Questions section of the [employer] website at [email or web address] Remember, [employer] is NOT to be used for urgent needs. For medical emergencies, dial 911. Now available from your iPhone and Android! Please provide this summary of care documentation to your next provider. Your primary care clinician is listed as RODRIGUEZ MARSHALL. If you have any questions after today's visit, please call [phone].

## 2018-04-20 LAB
ALBUMIN/CREAT UR: 5.4 MG/G CREAT (ref 0–30)
ALT SERPL-CCNC: 25 IU/L (ref 0–32)
AST SERPL-CCNC: 19 IU/L (ref 0–40)
BUN SERPL-MCNC: 18 MG/DL (ref 6–24)
BUN/CREAT SERPL: 23 (ref 9–23)
CALCIUM SERPL-MCNC: 9 MG/DL (ref 8.7–10.2)
CHLORIDE SERPL-SCNC: 100 MMOL/L (ref 96–106)
CHOLEST SERPL-MCNC: 197 MG/DL (ref 100–199)
CO2 SERPL-SCNC: 26 MMOL/L (ref 18–29)
CREAT SERPL-MCNC: 0.79 MG/DL (ref 0.57–1)
CREAT UR-MCNC: 106.7 MG/DL
EST. AVERAGE GLUCOSE BLD GHB EST-MCNC: 143 MG/DL
GFR SERPLBLD CREATININE-BSD FMLA CKD-EPI: 82 ML/MIN/1.73
GFR SERPLBLD CREATININE-BSD FMLA CKD-EPI: 95 ML/MIN/1.73
GLUCOSE SERPL-MCNC: 127 MG/DL (ref 65–99)
HBA1C MFR BLD: 6.6 % (ref 4.8–5.6)
HDLC SERPL-MCNC: 49 MG/DL
INTERPRETATION, 910389: NORMAL
LDLC SERPL CALC-MCNC: 89 MG/DL (ref 0–99)
MICROALBUMIN UR-MCNC: 5.8 UG/ML
POTASSIUM SERPL-SCNC: 5 MMOL/L (ref 3.5–5.2)
SODIUM SERPL-SCNC: 141 MMOL/L (ref 134–144)
TRIGL SERPL-MCNC: 297 MG/DL (ref 0–149)
TSH SERPL DL<=0.005 MIU/L-ACNC: 0.73 UIU/ML (ref 0.45–4.5)
VLDLC SERPL CALC-MCNC: 59 MG/DL (ref 5–40)

## 2018-04-20 NOTE — ASSESSMENT & PLAN NOTE
Stable, based on history, physical exam and review of pertinent labs, studies and medications; meds reconciled; continue current treatment plan, lifestyle modifications recommended. Key Antihyperglycemic Medications             metFORMIN (GLUCOPHAGE) 500 mg tablet  (Taking) TAKE 1 TABLET BY MOUTH TWICE A DAY WITH MEALS    VICTOZA 3-FAUSTO 0.6 mg/0.1 mL (18 mg/3 mL) sub-q pen  (Taking) INJECT 1.8 MG (0.3 ML) BY SUBCUTANEOUS ROUTE DAILY. Other Key Diabetic Medications             krill-om-3-dha-epa-phospho-ast (MEGARED OMEGA-3 KRILL OIL) 1,000-230-60 mg cap  (Taking) Take 1 Cap by mouth daily.         Lab Results   Component Value Date/Time    Hemoglobin A1c 6.4 08/17/2017 08:51 AM    Glucose 129 08/17/2017 08:51 AM    Creatinine 0.84 08/17/2017 08:51 AM    Microalb/Creat ratio (ug/mg creat.) <5.9 02/24/2017 11:02 AM    Cholesterol, total 191 08/17/2017 08:51 AM    HDL Cholesterol 45 08/17/2017 08:51 AM    LDL, calculated 85 08/17/2017 08:51 AM    Triglyceride 306 08/17/2017 08:51 AM     Diabetic Foot and Eye Exam HM Status   Topic Date Due    Eye Exam  Patient is scheduled to see her eye doctor [phone]    Diabetic Foot Care  Last seen by her podiatrist 2-2018

## 2018-04-20 NOTE — ASSESSMENT & PLAN NOTE
Uncontrolled, based on history, physical exam and review of pertinent labs, studies and medications; meds reconciled; lifestyle modifications recommended. Key Obesity Meds             metFORMIN (GLUCOPHAGE) 500 mg tablet  (Taking) TAKE 1 TABLET BY MOUTH TWICE A DAY WITH MEALS    VICTOZA 3-FAUSTO 0.6 mg/0.1 mL (18 mg/3 mL) sub-q pen  (Taking) INJECT 1.8 MG (0.3 ML) BY SUBCUTANEOUS ROUTE DAILY.         Lab Results   Component Value Date/Time    Hemoglobin A1c 6.4 08/17/2017 08:51 AM    Glucose 129 08/17/2017 08:51 AM    Cholesterol, total 191 08/17/2017 08:51 AM    HDL Cholesterol 45 08/17/2017 08:51 AM    LDL, calculated 85 08/17/2017 08:51 AM    Triglyceride 306 08/17/2017 08:51 AM    Sodium 143 08/17/2017 08:51 AM    Potassium 4.4 08/17/2017 08:51 AM    ALT (SGPT) 23 08/17/2017 08:51 AM    AST (SGOT) 20 08/17/2017 08:51 AM

## 2018-05-07 NOTE — TELEPHONE ENCOUNTER
----- Message from Jose Roque. Shannon sent at 5/6/2018  9:29 PM EDT -----  Regarding: RE:Pipe After Visit Follow-Up Message. Contact: [phone]  OK to continue with my current medications?   If so, please call in refill for Jami       Last seen 4/19/18, she is scheduled 5/24/18

## 2018-06-05 RX ORDER — METFORMIN HYDROCHLORIDE 500 MG/1
TABLET ORAL
Qty: 180 TAB | Refills: 0 | Status: SHIPPED | OUTPATIENT
Start: 2018-06-05 | End: 2018-09-03 | Stop reason: SDUPTHER

## 2018-09-03 RX ORDER — METFORMIN HYDROCHLORIDE 500 MG/1
TABLET ORAL
Qty: 180 TAB | Refills: 0 | Status: SHIPPED | OUTPATIENT
Start: 2018-09-03 | End: 2018-11-28 | Stop reason: SDUPTHER

## 2018-09-19 ENCOUNTER — OFFICE VISIT (OUTPATIENT)
Dept: FAMILY MEDICINE CLINIC | Age: 58
End: 2018-09-19

## 2018-09-19 VITALS
OXYGEN SATURATION: 95 % | BODY MASS INDEX: 37.1 KG/M2 | DIASTOLIC BLOOD PRESSURE: 78 MMHG | RESPIRATION RATE: 18 BRPM | HEART RATE: 78 BPM | TEMPERATURE: 98.4 F | WEIGHT: 236.4 LBS | HEIGHT: 67 IN | SYSTOLIC BLOOD PRESSURE: 112 MMHG

## 2018-09-19 DIAGNOSIS — E11.9 DIABETES MELLITUS TYPE 2, NONINSULIN DEPENDENT (HCC): Primary | ICD-10-CM

## 2018-09-19 DIAGNOSIS — E78.00 HYPERCHOLESTEROLEMIA: ICD-10-CM

## 2018-09-19 PROBLEM — M77.32 HEEL SPUR, LEFT: Status: ACTIVE | Noted: 2018-09-19

## 2018-09-19 NOTE — PROGRESS NOTES
Chief Complaint   Patient presents with    Diabetes     fasting follow up    Cholesterol Problem       1. Have you been to the ER, urgent care clinic since your last visit? Hospitalized since your last visit? No    2. Have you seen or consulted any other health care providers outside of the [address] [place] [address] since your last visit? Include any pap smears or colon screening.    Yes Jamie Contreras - will fax for report

## 2018-09-19 NOTE — PROGRESS NOTES
HISTORY OF PRESENT ILLNESS   HPI  Fasting follow up diabetes, hypercholesterolemia, labs and medication check. Trying to follow low carb diet. Walks on occasion for exercise 1-2 x a week and tries to get in lots of steps on her Fit Bit. Tana Herman for heel spur. Considering getting CSI and orthotic. Wt down a few lbs. Checks BS's a few x a week. Fasting in the AM: 162 this AM (ate stir cohn last night for dinner), 134, 136, 140  2 hours after BF: 126  2 hours after lunch: 113, 121, 169   No hypoglycemia. REVIEW OF SYMPTOMS     Review of Systems   Constitutional: Negative. Respiratory: Negative. Cardiovascular: Negative. Gastrointestinal: Negative for nausea and vomiting. Still some occasional loose stools on the Metformin but not as bad as when she was on the higher dose; also varies depending on what she eats, debbie lots of salads. Cut back, better this week   Genitourinary: Negative. Neurological: Negative.            PROBLEM LIST/MEDICAL HISTORY      Problem List  Date Reviewed: 9/19/2018          Codes Class Noted    Heel spur, left ICD-10-CM: M77.32  ICD-9-CM: 726.73  9/19/2018    Overview Signed 9/19/2018  8:49 AM by John Paul Sage MD     2/2018, Dr Estiven Herman             Tendonitis, Achilles, right ICD-10-CM: M76.61  ICD-9-CM: 726.71  4/19/2018    Overview Signed 4/19/2018  9:24 AM by John Paul Sage MD     2-2018: Dr Estiven Herman, bone spur chipped             Severe obesity (BMI 35.0-39. 9) with comorbidity ([facility] ICD-10-CM: E66.01  ICD-9-CM: 278.01  4/19/2018        Bone spurs of feet  ICD-10-CM: M77.50  ICD-9-CM: 726.91  2/24/2017    Overview Signed 2/24/2017 10:56 AM by John Paul Sage MD     Podiatry Dr Shanta Underwood             Diabetes mellitus type 2, noninsulin dependent ([facility] ICD-10-CM: E11.9  ICD-9-CM: 250.00  12/10/2015    Overview Signed 12/10/2015  2:37 PM by John Paul Sage MD     3/2001             Vitamin D deficiency ICD-10-CM: E55.9  ICD-9-CM: 268.9  12/11/2013    Overview Signed 12/11/2013  1:27 PM by Shahida Moody MD     12/2013               Postmenopause, LMP age ~ 43 yo, No HRT ICD-10-CM: Z78.0  ICD-9-CM: V49.81  12/5/2012        History of abnormal Pap smear, s/p Cryotherapy in her 27's ICD-10-CM: Z87.898  ICD-9-CM: V13.29  12/5/2012        Stress incontinence ICD-10-CM: N39.3  ICD-9-CM: Johnita Stack  12/5/2012    Overview Signed 12/5/2012  9:55 AM by Shahida Moody MD     Since child bearing years             Left sided sciatica ICD-10-CM: M54.32  ICD-9-CM: 724.3  12/5/2012    Overview Signed 12/5/2012 10:25 AM by Shahida Moody MD     12/2012             S/p colonoscopy with polypectomy and diverticulosis 4/2010 ICD-10-CM: Z98.890  ICD-9-CM: V45.89  4/27/2010    Overview Signed 4/27/2010  9:16 AM by MD Dr Berhane Pathak             Hiatal Hernia, Gastritis, mild; BX neg for H. Pylori 4/2010 ICD-10-CM: K29.70  ICD-9-CM: 535.50  4/27/2010    Overview Signed 4/27/2010  9:17 AM by MD Dr Berhane Pathak             Right knee pain; damage at medial meniscus; 2010 ICD-10-CM: M25.561  ICD-9-CM: 719.46  4/27/2010    Overview Signed 4/27/2010  9:19 AM by MD Dr Rita Pathak; P.T.              Left foot pain 2009; s/p CSI ICD-10-CM: Z14.289  ICD-9-CM: 729.5  4/27/2010    Overview Signed 4/27/2010  9:20 AM by MD Dr Ten Pathak             Uterine fibroid ICD-10-CM: D25.9  ICD-9-CM: 218.9  Unknown        Meniere's disease ICD-10-CM: H81.09  ICD-9-CM: 386.00  Unknown    Overview Signed 5/24/2016 12:30 PM by Shahida Moody MD     ENT ~ 2003, Dr Jo Tomlinson             FH: melanoma ICD-10-CM: Z80.8  ICD-9-CM: V16.8  Unknown        Hypercholesterolemia ICD-10-CM: E78.00  ICD-9-CM: 272.0  Unknown        GERD (gastroesophageal reflux disease) ICD-10-CM: K21.9  ICD-9-CM: 530.81  Unknown                  PAST SURGICAL HISTORY       Past Surgical History:   Procedure Laterality Date    HX BREAST BIOPSY Right 6/2014, [facility]    Benign    HX BREAST LUMPECTOMY   1986    benign fibroadenoma right breast    [address]    Emergency for fetal distress    HX COLONOSCOPY  ~2010    Dr Bandar Mazariegos; Normal except Diverticulosis, but f/u 5 yrs d/t Fhx    HX COLONOSCOPY  07/11/2016, Dr Banadr Mazariegos    Polyps, Multiple Diverticulae, Internal Hemorrhoids; f/u 5 yrs    HX DILATION AND CURETTAGE  10/8/14    AUB & Uterine Polyp    HX ENDOSCOPY  7-11-16, EGD, Dr Bandar Mazariegos    Hiatal Hernia, Grade 1 Esophagitis w/ reflux, Gastritis    HX LAP CHOLECYSTECTOMY  1998    gallstones    HX TONSIL AND ADENOIDECTOMY  1981    HYSTEROSCOPY,W/ENDO BX  5/2014    AUB; Negative         MEDICATIONS      Current Outpatient Prescriptions   Medication Sig    metFORMIN (GLUCOPHAGE) 500 mg tablet TAKE 1 TABLET BY MOUTH TWICE A DAY WITH FOOD    Liraglutide (VICTOZA 3-FAUSTO) 0.6 mg/0.1 mL (18 mg/3 mL) pnij INJECT 1.8 MG (0.3 ML) BY SUBCUTANEOUS ROUTE DAILY.  diclofenac (VOLTAREN) 1 % gel 2 (TWO) GRAM FOUR TIMES DAILY    cholecalciferol (VITAMIN D3) 1,000 unit cap Take 1,000 Units by mouth three (3) days a week.  krill-om-3-dha-epa-phospho-ast (MEGARED OMEGA-3 KRILL OIL) 1,000-230-60 mg cap Take 1 Cap by mouth daily. No current facility-administered medications for this visit.            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[place] [address] Smoking status: Former Smoker     Packs/day: 1.00     Years: 10.00     Types: Cigarettes     Quit date: 4/27/1991    Smokeless tobacco: Never Used    Alcohol use Yes      Comment: 2 glasses wine maybe twice a week    Drug use: No    Sexual activity: Yes     Partners: Male     Other Topics Concern    Caffeine Concern No     occ coffee, mostly just on weekends    Weight Concern No     staying in the 230's-240's    Special Diet No    Exercise Yes     walks 1-2 x a week x 15-20 minutes; got Fit Bit : increased her steps alot until foot issues 2-2018: Dr Barrington Candelario History Narrative    Diabetes: Initial PPV23 4-2018 age 59; will need PCV 15 at age 71 yo and PPSV 21 again at 76 yo        IMMUNIZATIONS  Immunization History   Administered Date(s) Administered    Influenza Vaccine 10/16/2014, 10/22/2015, 11/01/2017    Influenza Vaccine Split 09/01/2009, 11/01/2012    Pneumococcal Polysaccharide (PPSV-23) 04/19/2018    TD Vaccine 06/05/2012         FAMILY HISTORY     Family History   Problem Relation Age of Onset    COPD Mother      smoker    Colon Polyps Mother 72    Osteoporosis Mother     Stroke Father     Dementia Father      Alzheimer's    Heart Disease Father      PVD    Cancer Sister      melanoma    Diabetes Maternal Grandmother     Cancer Maternal Grandfather      lung    Hypertension Brother      living in his 66's         VITALS     Visit Vitals    /78 (BP 1 Location: Left arm, BP Patient Position: Sitting)    Pulse 78    Temp 98.4 °F (36.9 °C) (Oral)    Resp 18    Ht 5' 7\" (1.702 m)    Wt 236 lb 6.4 oz (107.2 kg)    SpO2 95%    BMI 37.03 kg/m2          PHYSICAL EXAMINATION     Physical Exam   Constitutional: She is well-developed, well-nourished, and in no distress. Neck: Neck supple. Carotid bruit is not present. Cardiovascular: Normal rate, regular rhythm and normal heart sounds. No murmur heard.   Pulmonary/Chest: Effort normal and breath sounds normal. Abdominal: Soft. She exhibits no distension and no mass. There is no tenderness. Musculoskeletal: She exhibits no edema. Lymphadenopathy:     She has no cervical adenopathy. Vitals reviewed.              ASSESSMENT & PLAN       ICD-10-CM ICD-9-CM    1. Diabetes mellitus type 2, noninsulin dependent (HCC) E11.9 250.00  DIABETES EYE EXAM      METABOLIC PANEL, COMPREHENSIVE      HEMOGLOBIN A1C WITH EAG   2. Hypercholesterolemia E78.00 272.0 LIPID PANEL     Fasting labs today  Cardiovascular risk and specific lipid/LDL/BS/HgbA1c goals reviewed  Reviewed diet, nutrition, exercise, weight management, BMI/goals, age/risk based screening recommendations, health maintenance & prevention counseling. Reviewed medications and side effects in detail  Specific diabetic recommendations: annual eye examinations at Ophthalmology discussed; saw her eye doctor Dr. Joyce Frank for diabetic eye exam 5-2018, report requested  Further follow up & other recommendations pending review of labs.  If all remains good and stable, follow up in 6 months

## 2018-09-19 NOTE — PATIENT INSTRUCTIONS

## 2018-09-20 LAB
ALBUMIN SERPL-MCNC: 4.5 G/DL (ref 3.6–4.8)
ALBUMIN/GLOB SERPL: 1.7 {RATIO} (ref 1.2–2.2)
ALP SERPL-CCNC: 71 IU/L (ref 39–117)
ALT SERPL-CCNC: 42 IU/L (ref 0–32)
AST SERPL-CCNC: 30 IU/L (ref 0–40)
BILIRUB SERPL-MCNC: 0.3 MG/DL (ref 0–1.2)
BUN SERPL-MCNC: 15 MG/DL (ref 8–27)
BUN/CREAT SERPL: 18 (ref 12–28)
CALCIUM SERPL-MCNC: 9.1 MG/DL (ref 8.7–10.3)
CHLORIDE SERPL-SCNC: 104 MMOL/L (ref 96–106)
CHOLEST SERPL-MCNC: 189 MG/DL (ref 100–199)
CO2 SERPL-SCNC: 23 MMOL/L (ref 20–29)
CREAT SERPL-MCNC: 0.82 MG/DL (ref 0.57–1)
EST. AVERAGE GLUCOSE BLD GHB EST-MCNC: 146 MG/DL
GLOBULIN SER CALC-MCNC: 2.7 G/DL (ref 1.5–4.5)
GLUCOSE SERPL-MCNC: 140 MG/DL (ref 65–99)
HBA1C MFR BLD: 6.7 % (ref 4.8–5.6)
HDLC SERPL-MCNC: 47 MG/DL
INTERPRETATION, 910389: NORMAL
LDLC SERPL CALC-MCNC: 99 MG/DL (ref 0–99)
POTASSIUM SERPL-SCNC: 4.4 MMOL/L (ref 3.5–5.2)
PROT SERPL-MCNC: 7.2 G/DL (ref 6–8.5)
SODIUM SERPL-SCNC: 142 MMOL/L (ref 134–144)
TRIGL SERPL-MCNC: 213 MG/DL (ref 0–149)
VLDLC SERPL CALC-MCNC: 43 MG/DL (ref 5–40)

## 2018-10-04 ENCOUNTER — TELEPHONE (OUTPATIENT)
Dept: FAMILY MEDICINE CLINIC | Age: 58
End: 2018-10-04

## 2018-10-04 RX ORDER — PEN NEEDLE, DIABETIC 31 GX3/16"
NEEDLE, DISPOSABLE MISCELLANEOUS
Qty: 100 PEN NEEDLE | Refills: 1 | Status: SHIPPED | OUTPATIENT
Start: 2018-10-04 | End: 2020-02-19 | Stop reason: CLARIF

## 2018-10-04 NOTE — TELEPHONE ENCOUNTER
----- Message from Soraya Ortega sent at 10/2/2018  3:38 PM EDT -----  Regarding: Prescription Question  Contact: [phone]  ,  Dr Yanira Falcon said I should contact you about this. My insurance company sent me a letter about price increase for the Novotwist needles (32G 5MM) that I use with Victoza. They are recomment BD Insulin Pen Needles instead. [facility] and they don't have the exact same size but do have 32G 4MM size. Pharmacist recommended this because it's the same guage which she said was more important than matching the MM. If you think the BD brand is OK for me to use, will you call this in for me:  BD Insulin pen needles, 32 G 4MM. Thanks,  Jeanette Marking over another order for the pen needles, per verbal order from Dr Kami Varela.

## 2018-10-07 ENCOUNTER — TELEPHONE (OUTPATIENT)
Dept: FAMILY MEDICINE CLINIC | Age: 58
End: 2018-10-07

## 2018-10-07 NOTE — TELEPHONE ENCOUNTER
Tg down some, but not goal <150  HDL 47, goal >50  LDL is at goal of <100 right at 99  Fasting , HgBA1c went up slightly from 6.6 to 6.7  One liver enzyme mildly elevated, likely due to fatty liver which can reverse w/ diet and wt loss. Included tips below. Try to lose 15 lbs  Fasting follow up 3months this time for closer watch on liver, lipids and sugar. Set now    Diabetes/Pre-Diabetes Meal Planning and Exercise:  ~Avoid sweets and sugars. ~Limit carbohydrate intake to between 30-45 grams of carbs max per meal and no more than 15 grams of carbs per snack  ~Do not skip meals. ~Eat light snacks between meals that are low in fat and high in protein. ~Divide your plate by types of foods. Put non-starchy vegetables on half the plate, meat or other protein food on one-quarter of the plate, and a grain or starchy vegetable in the final quarter of the plate. Keep starches dark in color trying to void white starches in general.  ~Limit alcohol to no more than 1 standard drink per day for women and 2 for men (ie/ 12 oz beer, 5 oz wine, 1.5 oz liquor). ~Get regular moderate intensity cardio/aerobic exercise at least 150 minutes per week ie/ 30-50 minutes 3-4 x a week. ~Reference the ADA (American Diabetes Association Diet) for additional nutrition tips. ~We can offer referral to a certified diabetes educator or our nutrition services programs if needed.

## 2019-01-09 ENCOUNTER — OFFICE VISIT (OUTPATIENT)
Dept: FAMILY MEDICINE CLINIC | Age: 59
End: 2019-01-09

## 2019-01-09 VITALS
HEART RATE: 80 BPM | OXYGEN SATURATION: 97 % | SYSTOLIC BLOOD PRESSURE: 118 MMHG | TEMPERATURE: 98.6 F | BODY MASS INDEX: 35.94 KG/M2 | HEIGHT: 67 IN | WEIGHT: 229 LBS | RESPIRATION RATE: 18 BRPM | DIASTOLIC BLOOD PRESSURE: 80 MMHG

## 2019-01-09 DIAGNOSIS — E78.00 HYPERCHOLESTEROLEMIA: ICD-10-CM

## 2019-01-09 DIAGNOSIS — E11.9 DIABETES MELLITUS TYPE 2, NONINSULIN DEPENDENT (HCC): Primary | ICD-10-CM

## 2019-01-09 PROBLEM — M76.62 TENDONITIS, ACHILLES, LEFT: Status: ACTIVE | Noted: 2019-01-09

## 2019-01-09 PROBLEM — Z85.828 HISTORY OF SCC (SQUAMOUS CELL CARCINOMA) OF SKIN: Status: ACTIVE | Noted: 2019-01-09

## 2019-01-09 NOTE — PROGRESS NOTES
Chief Complaint   Patient presents with    Cholesterol Problem     fasting follow up     Diabetes     1. Have you been to the ER, urgent care clinic since your last visit? Hospitalized since your last visit? No    2. Have you seen or consulted any other health care providers outside of the [address] [place] [address] since your last visit? Include any pap smears or colon screening.    Yes Dr Alli Burgess DPM

## 2019-01-09 NOTE — PROGRESS NOTES
HISTORY OF PRESENT ILLNESS   HPI  Fasting follow up diabetes, hypercholesterolemia, labs and medication check. Admits she has not been compliant w/ diet or exercise. She has been compliant w/ medication regimen. Gets occasional loose stools w/ the Metformin, but not all the time, just at random and not bad enough for her to want to stop it. She actually prefers the regularity and wt reduction she gets from it. It is not associated w/ abdominal pain, N or V. Happy her wt is coming down and is currently the lowest wt she has had in years. Checks BS's fasting qam and they range from 119-162 but usually run in the 130-140 range. She did get a CSI in her left foot per podiatry last month and her BS's did run on the higher side during that time. No hypoglycemia. Fasting BS at home this am was 133. Otherwise feels well in general.        REVIEW OF SYMPTOMS     Review of Systems   Constitutional: Negative. Respiratory: Negative. Cardiovascular: Negative. Gastrointestinal: Negative for abdominal pain, nausea and vomiting. Genitourinary: Negative. Neurological: Negative. Endo/Heme/Allergies: Negative.            PROBLEM LIST/MEDICAL HISTORY      Problem List  Date Reviewed: 1/9/2019          Codes Class Noted    Tendonitis, Achilles, left ICD-10-CM: M76.62  ICD-9-CM: 726.71  1/9/2019    Overview Signed 1/9/2019  8:33 AM by Yobani Barnes MD     CSI Dr Marvin Becker              Heel spur, left ICD-10-CM: M77.32  ICD-9-CM: 726.73  9/19/2018    Overview Signed 9/19/2018  8:49 AM by Yobani Barnes MD     2/2018, Dr Marvin Becker             Tendonitis, Achilles, right ICD-10-CM: M76.61  ICD-9-CM: 726.71  4/19/2018    Overview Signed 4/19/2018  9:24 AM by Yobani Barnes MD     2-2018: Dr Marvin Becker, bone spur chipped             Severe obesity (BMI 35.0-39. 9) with comorbidity ([facility] [address] [address] ICD-10-CM: E66.01  ICD-9-CM: 278.01  4/19/2018        Bone spurs of feet  ICD-10-CM: M77.50  ICD-9-CM: 726.91  2/24/2017    Overview Signed 2/24/2017 10:56 AM by Francis Musa MD     Podiatry Dr Marek Cooper             Diabetes mellitus type 2, noninsulin dependent ([facility] ICD-10-CM: E11.9  ICD-9-CM: 250.00  12/10/2015    Overview Signed 12/10/2015  2:37 PM by Francis Musa MD     3/2001             Vitamin D deficiency ICD-10-CM: E55.9  ICD-9-CM: 268.9  12/11/2013    Overview Signed 12/11/2013  1:27 PM by Francis Musa MD     12/2013               Postmenopause, LMP age ~ 43 yo, No HRT ICD-10-CM: Z78.0  ICD-9-CM: V49.81  12/5/2012        History of abnormal Pap smear, s/p Cryotherapy in her 27's ICD-10-CM: Z87.898  ICD-9-CM: V13.29  12/5/2012        Stress incontinence ICD-10-CM: N39.3  ICD-9-CM: Allegra Omar  12/5/2012    Overview Signed 12/5/2012  9:55 AM by Francis Musa MD     Since child bearing years             Left sided sciatica ICD-10-CM: M54.32  ICD-9-CM: 724.3  12/5/2012    Overview Signed 12/5/2012 10:25 AM by Francis Musa MD     12/2012             S/p colonoscopy with polypectomy and diverticulosis 4/2010 ICD-10-CM: Z98.890  ICD-9-CM: V45.89  4/27/2010    Overview Signed 4/27/2010  9:16 AM by MD Dr Nathalie Aguilar April             Hiatal Hernia, Gastritis, mild; BX neg for H. Pylori 4/2010 ICD-10-CM: K29.70  ICD-9-CM: 535.50  4/27/2010    Overview Signed 4/27/2010  9:17 AM by MD Dr Nathalie Aguilar April             Right knee pain; damage at medial meniscus; 2010 ICD-10-CM: M25.561  ICD-9-CM: 719.46  4/27/2010    Overview Signed 4/27/2010  9:19 AM by MD Dr Lyndsay Aguilar; P.T.              Left foot pain 2009; s/p CSI ICD-10-CM: Q89.045  ICD-9-CM: 729.5  4/27/2010    Overview Signed 4/27/2010  9:20 AM by MD Dr Marek Aguilar             Uterine fibroid ICD-10-CM: D25.9  ICD-9-CM: 218.9  Unknown        Meniere's disease ICD-10-CM: H81.09  ICD-9-CM: 386.00  Unknown Overview Signed 5/24/2016 12:30 PM by Francis Musa MD     ENT ~ 2003, Dr Jaylin Hunt             FH: melanoma ICD-10-CM: Z80.8  ICD-9-CM: V16.8  Unknown        Hypercholesterolemia ICD-10-CM: E78.00  ICD-9-CM: 272.0  Unknown        GERD (gastroesophageal reflux disease) ICD-10-CM: K21.9  ICD-9-CM: 530.81  Unknown                  PAST SURGICAL HISTORY       Past Surgical History:   Procedure Laterality Date    HX BREAST BIOPSY Right 6/2014, Hedy Mackenzie    Benign    HX BREAST LUMPECTOMY   1986    benign fibroadenoma right breast    [address]    Emergency for fetal distress    HX COLONOSCOPY  ~2010    Dr Zelaya April; Normal except Diverticulosis, but f/u 5 yrs d/t Fhx    HX COLONOSCOPY  07/11/2016, Dr Zelaya April    Polyps, Multiple Diverticulae, Internal Hemorrhoids; f/u 5 yrs    HX DILATION AND CURETTAGE  10/8/14    AUB & Uterine Polyp    HX ENDOSCOPY  7-11-16, EGD, Dr Zelaya April    Hiatal Hernia, Grade 1 Esophagitis w/ reflux, Gastritis    HX LAP CHOLECYSTECTOMY  1998    gallstones    HX TONSIL AND ADENOIDECTOMY  1981    HYSTEROSCOPY,W/ENDO [place] 9  5/2014    AUB; Negative         MEDICATIONS      Current Outpatient Medications   Medication Sig    metFORMIN (GLUCOPHAGE) 500 mg tablet TAKE 1 TABLET BY MOUTH TWICE A DAY WITH FOOD    Insulin Needles, Disposable, (EASY COMFORT PEN NEEDLES) 32 gauge x 5/32\" ndle Per insurance preference, use 1x/d dx diabetes E11.9 to use with victoza    Liraglutide (VICTOZA 3-FAUSTO) 0.6 mg/0.1 mL (18 mg/3 mL) pnij INJECT 1.8 MG (0.3 ML) BY SUBCUTANEOUS ROUTE DAILY.  diclofenac (VOLTAREN) 1 % gel 2 (TWO) GRAM FOUR TIMES DAILY    krill-om-3-dha-epa-phospho-ast (MEGARED OMEGA-3 KRILL OIL) 1,000-230-60 mg cap Take 1 Cap by mouth daily. No current facility-administered medications for this visit.            ALLERGIES     Allergies   Allergen Reactions    Glipizide Other (comments)     Frequent hypoglycemia    Other Medication Diarrhea     Intolerant of >1000 mg of Metformin    Phentermine Other (comments)     Dizziness and \"crashes\"    Succinylcholine Other (comments)     Prolonged period of recovery following anesthesia          SOCIAL HISTORY       Social History     Socioeconomic History    Marital status:      Spouse name: Not on file    Number of children: 3    Years of education: Not on file    Highest education level: Not on file   Social Needs    Financial resource strain: Not on file    Food insecurity - worry: Not on file    Food insecurity - inability: Not on file   [employer] needs - medical: Not on file   [employer] needs - non-medical: Not on file   Occupational History    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[place] [address] Smoking status: Former Smoker     Packs/day: 1.00     Years: 10.00     Pack years: 10.00     Types: Cigarettes     Last attempt to quit: 1991     Years since quittin.7    Smokeless tobacco: Never Used   Substance and Sexual Activity    Alcohol use: Yes     Comment: 2 glasses wine maybe twice a week    Drug use: No    Sexual activity: Yes     Partners: Male   Other Topics Concern     Service Not Asked    Blood Transfusions Not Asked    Caffeine Concern No     Comment: occ coffee, mostly just on weekends    Occupational Exposure Not Asked    Hobby Hazards Not Asked    Sleep Concern Not Asked    Stress Concern Not Asked    Weight Concern No     Comment: happy her wt is coming down [address] No    Back Care Not Asked    Exercise No     Comment: not as much lately due to foot issues, got CSI ; was walking 1-2 x a week x 15-20 minutes; got Fit Bit : increased her steps alot until foot issues : Dr Jenniffer Landau Not Asked    [facility] Not Asked    Self-Exams Not Asked   Social History Narrative    Diabetes: Initial PPV23  age 59; will need PCV 13 at age 69 yo and PPSV 21 again at 74 yo    IMMUNIZATIONS  Immunization History   Administered Date(s) Administered    Influenza Vaccine 10/16/2014, 10/22/2015, 11/01/2017, 11/01/2018    Influenza Vaccine Split 09/01/2009, 11/01/2012    Pneumococcal Polysaccharide (PPSV-23) 04/19/2018    TD Vaccine 06/05/2012         FAMILY HISTORY     Family History   Problem Relation Age of Onset    COPD Mother         smoker    Colon Polyps Mother 72    Osteoporosis Mother     Stroke Father     Dementia Father         Alzheimer's    Heart Disease Father         PVD    Cancer Sister         melanoma    Diabetes Maternal Grandmother     Cancer Maternal Grandfather         lung    Hypertension Brother         living in his 66's         VITALS     Visit Vitals  /80 (BP 1 Location: Left arm, BP Patient Position: Sitting)   Pulse 80   Temp 98.6 °F (37 °C) (Oral)   Resp 18   Ht 5' 7\" (1.702 m)   Wt 229 lb (103.9 kg)   SpO2 97%   BMI 35.87 kg/m²          PHYSICAL EXAMINATION     Physical Exam   Constitutional: No distress. Neck: Neck supple. Carotid bruit is not present. No thyromegaly present. Cardiovascular: Normal rate, regular rhythm and normal heart sounds. No murmur heard. Pulmonary/Chest: Effort normal and breath sounds normal.   Abdominal: Soft. Bowel sounds are normal. There is no tenderness. Musculoskeletal: She exhibits no edema or tenderness. Vitals reviewed.              ASSESSMENT & PLAN       ICD-10-CM ICD-9-CM    1. Diabetes mellitus type 2, noninsulin dependent (HCC) E11.9 250.00  DIABETES EYE EXAM      CBC W/O DIFF      LIPID PANEL      METABOLIC PANEL, COMPREHENSIVE      HEMOGLOBIN A1C WITH EAG      MICROALBUMIN, UR, RAND W/ MICROALB/CREAT RATIO   2.  Hypercholesterolemia E78.00 272.0 LIPID PANEL      METABOLIC PANEL, COMPREHENSIVE      TSH 3RD GENERATION            Fasting labs today  Cardiovascular risk and specific lipid/LDL/BS/HgBA1c goals reviewed  Reviewed medications and side effects in detail  Specific diabetic recommendations: diabetic diet discussed in detail, written exchange diet given and annual eye examinations at Ophthalmology discussed   Reviewed diet, nutrition, exercise, weight management, BMI/goals, age/risk based screening recommendations, health maintenance & prevention counseling. Further follow up & other recommendations pending review of labs.  If all remains good and stable, follow up in 6 months, sooner prn

## 2019-01-09 NOTE — PATIENT INSTRUCTIONS

## 2019-01-10 LAB
ALBUMIN SERPL-MCNC: 4.6 G/DL (ref 3.6–4.8)
ALBUMIN/CREAT UR: 5.5 MG/G CREAT (ref 0–30)
ALBUMIN/GLOB SERPL: 1.7 {RATIO} (ref 1.2–2.2)
ALP SERPL-CCNC: 74 IU/L (ref 39–117)
ALT SERPL-CCNC: 25 IU/L (ref 0–32)
AST SERPL-CCNC: 17 IU/L (ref 0–40)
BILIRUB SERPL-MCNC: 0.3 MG/DL (ref 0–1.2)
BUN SERPL-MCNC: 16 MG/DL (ref 8–27)
BUN/CREAT SERPL: 19 (ref 12–28)
CALCIUM SERPL-MCNC: 8.9 MG/DL (ref 8.7–10.3)
CHLORIDE SERPL-SCNC: 106 MMOL/L (ref 96–106)
CHOLEST SERPL-MCNC: 190 MG/DL (ref 100–199)
CO2 SERPL-SCNC: 21 MMOL/L (ref 20–29)
CREAT SERPL-MCNC: 0.84 MG/DL (ref 0.57–1)
CREAT UR-MCNC: 178.7 MG/DL
ERYTHROCYTE [DISTWIDTH] IN BLOOD BY AUTOMATED COUNT: 13.5 % (ref 12.3–15.4)
EST. AVERAGE GLUCOSE BLD GHB EST-MCNC: 137 MG/DL
GLOBULIN SER CALC-MCNC: 2.7 G/DL (ref 1.5–4.5)
GLUCOSE SERPL-MCNC: 112 MG/DL (ref 65–99)
HBA1C MFR BLD: 6.4 % (ref 4.8–5.6)
HCT VFR BLD AUTO: 42.7 % (ref 34–46.6)
HDLC SERPL-MCNC: 51 MG/DL
HGB BLD-MCNC: 14.3 G/DL (ref 11.1–15.9)
INTERPRETATION, 910389: NORMAL
LDLC SERPL CALC-MCNC: 106 MG/DL (ref 0–99)
MCH RBC QN AUTO: 30 PG (ref 26.6–33)
MCHC RBC AUTO-ENTMCNC: 33.5 G/DL (ref 31.5–35.7)
MCV RBC AUTO: 90 FL (ref 79–97)
MICROALBUMIN UR-MCNC: 9.9 UG/ML
PLATELET # BLD AUTO: 234 X10E3/UL (ref 150–379)
POTASSIUM SERPL-SCNC: 4.5 MMOL/L (ref 3.5–5.2)
PROT SERPL-MCNC: 7.3 G/DL (ref 6–8.5)
RBC # BLD AUTO: 4.76 X10E6/UL (ref 3.77–5.28)
SODIUM SERPL-SCNC: 145 MMOL/L (ref 134–144)
TRIGL SERPL-MCNC: 165 MG/DL (ref 0–149)
TSH SERPL DL<=0.005 MIU/L-ACNC: 0.8 UIU/ML (ref 0.45–4.5)
VLDLC SERPL CALC-MCNC: 33 MG/DL (ref 5–40)
WBC # BLD AUTO: 10 X10E3/UL (ref 3.4–10.8)

## 2019-04-10 ENCOUNTER — APPOINTMENT (OUTPATIENT)
Dept: GENERAL RADIOLOGY | Age: 59
End: 2019-04-10
Attending: PHYSICIAN ASSISTANT
Payer: COMMERCIAL

## 2019-04-10 ENCOUNTER — HOSPITAL ENCOUNTER (EMERGENCY)
Age: 59
Discharge: HOME | End: 2019-04-10
Attending: EMERGENCY MEDICINE
Payer: COMMERCIAL

## 2019-04-10 VITALS
DIASTOLIC BLOOD PRESSURE: 91 MMHG | RESPIRATION RATE: 18 BRPM | SYSTOLIC BLOOD PRESSURE: 151 MMHG | TEMPERATURE: 98.2 F | HEART RATE: 92 BPM | OXYGEN SATURATION: 97 %
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
## 2019-04-10 DIAGNOSIS — R07.89 CHEST DISCOMFORT: ICD-10-CM

## 2019-04-10 PROCEDURE — 99282 EMERGENCY DEPT VISIT SF MDM: CPT

## 2019-04-10 PROCEDURE — 71046 X-RAY EXAM CHEST 2 VIEWS: CPT

## 2019-04-10 PROCEDURE — 99283 EMERGENCY DEPT VISIT LOW MDM: CPT

## 2019-04-10 NOTE — DISCHARGE INSTRUCTIONS
Patient Education        Chest Pain: Care Instructions  Your Care Instructions    There are many things that can cause chest pain. Some are not serious and will get better on their own in a few days. But some kinds of chest pain need more testing and treatment. Your doctor may have recommended a follow-up visit in the next 8 to 12 hours. If you are not getting better, you may need more tests or treatment. Even though your doctor has released you, you still need to watch for any problems. The doctor carefully checked you, but sometimes problems can develop later. If you have new symptoms or if your symptoms do not get better, get medical care right away. If you have worse or different chest pain or pressure that lasts more than 5 minutes or you passed out (lost consciousness), call 911 or seek other emergency help right away. A medical visit is only one step in your treatment. Even if you feel better, you still need to do what your doctor recommends, such as going to all suggested follow-up appointments and taking medicines exactly as directed. This will help you recover and help prevent future problems. How can you care for yourself at home? · Rest until you feel better. · Take your medicine exactly as prescribed. Call your doctor if you think you are having a problem with your medicine. · Do not drive after taking a prescription pain medicine. When should you call for help? Call 911 if:    · You passed out (lost consciousness).     · You have severe difficulty breathing.     · You have symptoms of a heart attack. These may include:  ? Chest pain or pressure, or a strange feeling in your chest.  ? Sweating. ? Shortness of breath. ? Nausea or vomiting. ? Pain, pressure, or a strange feeling in your back, neck, jaw, or upper belly or in one or both shoulders or arms. ? Lightheadedness or sudden weakness. ? A fast or irregular heartbeat.   After you call 911, the  may tell you to chew 1 adult-strength or 2 to 4 low-dose aspirin. Wait for an ambulance. Do not try to drive yourself.    Call your doctor today if:    · You have any trouble breathing.     · Your chest pain gets worse.     · You are dizzy or lightheaded, or you feel like you may faint.     · You are not getting better as expected.     · You are having new or different chest pain. Where can you learn more? Go to http://jose f-mason.info/. Enter A120 in the search box to learn more about \"Chest Pain: Care Instructions. \"  Current as of: September 23, 2018  Content Version: 11.9  © 9641-1756 "[employer]. Care instructions adapted under license by [employer] (which disclaims liability or warranty for this information). If you have questions about a medical condition or this instruction, always ask your healthcare professional. [place] [address] any warranty or liability for your use of this information. We hope that we have addressed all of your medical concerns. The examination and treatment you received in the Emergency Department were for an emergent problem and were not intended as complete care. It is important that you follow up with your healthcare provider(s) for ongoing care. If your symptoms worsen or do not improve as expected, and you are unable to reach your usual health care provider(s), you should return to the Emergency Department. Today's healthcare is undergoing tremendous change, and patient satisfaction surveys are one of the many tools to assess the quality of medical care. You may receive a survey from the [employer] organization regarding your experience in the Emergency Department. I hope that your experience has been completely positive, particularly the medical care that I provided. As such, please participate in the survey; anything less than excellent does not meet my expectations or intentions.         [facility] and [employer] [facility] participate in nationally recognized quality of care measures. If your blood pressure is greater than 120/80, as reported below, we urge that you seek medical care to address the potential of high blood pressure, commonly known as hypertension. Hypertension can be hereditary or can be caused by certain medical conditions, pain, stress, or \"white coat syndrome. \"       Please make an appointment with your health care provider(s) for follow up of your Emergency Department visit. VITALS:   Patient Vitals for the past 8 hrs:   Temp Pulse Resp BP SpO2   04/10/19 1039 98.2 °F (36.8 °C) 92 18 (!) 170/111 97 %   04/10/19 1007 -- (!) 107 -- -- 98 %          Thank you for allowing us to provide you with medical care today. We realize that you have many choices for your emergency care needs. Please choose us in the future for any continued health care needs. Karena Griffon Daryle Gambler, [address] [place] [address] Avenue: [phone]            No results found for this or any previous visit (from the past 24 hour(s)). Xr Chest Pa Lat    Result Date: 4/10/2019  EXAM:  XR CHEST PA LAT INDICATION: Chest discomfort. COMPARISON: None TECHNIQUE: PA and lateral chest views FINDINGS: The cardiomediastinal and hilar contours are within normal limits. The pulmonary vasculature is within normal limits. The lungs and pleural spaces are clear. There is no pneumothorax. The visualized bones and upper abdomen are age-appropriate. IMPRESSION: No acute process.
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
## 2019-04-12 ENCOUNTER — TELEPHONE (OUTPATIENT)
Dept: FAMILY MEDICINE CLINIC | Age: 59
End: 2019-04-12
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
## 2019-04-15 ENCOUNTER — OFFICE VISIT (OUTPATIENT)
Dept: FAMILY MEDICINE CLINIC | Age: 59
End: 2019-04-15

## 2019-04-15 VITALS
WEIGHT: 233.6 LBS | TEMPERATURE: 98.9 F | HEIGHT: 67 IN | OXYGEN SATURATION: 98 % | RESPIRATION RATE: 18 BRPM | HEART RATE: 95 BPM | DIASTOLIC BLOOD PRESSURE: 72 MMHG | BODY MASS INDEX: 36.66 KG/M2 | SYSTOLIC BLOOD PRESSURE: 116 MMHG

## 2019-04-15 DIAGNOSIS — F43.21 ADJUSTMENT DISORDER WITH DEPRESSED MOOD: Primary | ICD-10-CM
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
## 2019-04-16 NOTE — PATIENT INSTRUCTIONS
Learning About Stress  What is stress? Stress is what you feel when you have to handle more than you are used to. Stress is a fact of life for most people, and it affects everyone differently. What causes stress for you may not be stressful for someone else. A lot of things can cause stress. You may feel stress when you go on a job interview, take a test, or run a race. This kind of short-term stress is normal and even useful. It can help you if you need to work hard or react quickly. For example, stress can help you finish an important job on time. Stress also can last a long time. Long-term stress is caused by stressful situations or events. Examples of long-term stress include long-term health problems, ongoing problems at work, or conflicts in your family. Long-term stress can harm your health. How does stress affect your health? When you are stressed, your body responds as though you are in danger. It makes hormones that speed up your heart, make you breathe faster, and give you a burst of energy. This is called the fight-or-flight stress response. If the stress is over quickly, your body goes back to normal and no harm is done. But if stress happens too often or lasts too long, it can have bad effects. Long-term stress can make you more likely to get sick, and it can make symptoms of some diseases worse. If you tense up when you are stressed, you may develop neck, shoulder, or low back pain. Stress is linked to high blood pressure and heart disease. Stress also harms your emotional health. It can make you cooper, tense, or depressed. Your relationships may suffer, and you may not do well at work or school. What can you do to manage stress? How to relax your mind  · Write. It may help to write about things that are bothering you. This helps you find out how much stress you feel and what is causing it. When you know this, you can find better ways to cope. · Let your feelings out.  Talk, laugh, cry, and express anger when you need to. Talking with friends, family, a counselor, or a member of the clergy about your feelings is a healthy way to relieve stress. · Do something you enjoy. For example, listen to music or go to a movie. Practice your hobby or do volunteer work. · Meditate. This can help you relax, because you are not worrying about what happened before or what may happen in the future. · Do guided imagery. Imagine yourself in any setting that helps you feel calm. You can use audiotapes, books, or a teacher to guide you. How to relax your body  · Do something active. Exercise or activity can help reduce stress. Walking is a great way to get started. Even everyday activities such as housecleaning or yard work can help. · Do breathing exercises. For example:  ? From a standing position, bend forward from the waist with your knees slightly bent. Let your arms dangle close to the floor. ? Breathe in slowly and deeply as you return to a standing position. Roll up slowly and lift your head last.  ? Hold your breath for just a few seconds in the standing position. ? Breathe out slowly and bend forward from the waist.  · Try yoga or priyanka chi. These techniques combine exercise and meditation. You may need some training at first to learn them. What can you do to prevent stress? · Manage your time. This helps you find time to do the things you want and need to do. · Get enough sleep. Your body recovers from the stresses of the day while you are sleeping. · Get support. Your family, friends, and community can make a difference in how you experience stress. Where can you learn more? Go to http://jose f-mason.info/. Enter Z482 in the search box to learn more about \"Learning About Stress. \"  Current as of: June 28, 2018  Content Version: 11.9  © 2165-6248 [employer].  Care instructions adapted under license by [employer] (which disclaims liability or warranty for this information). If you have questions about a medical condition or this instruction, always ask your healthcare professional. [place] [address] any warranty or liability for your use of this information.
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
## 2019-04-17 ENCOUNTER — OFFICE VISIT (OUTPATIENT)
Dept: FAMILY MEDICINE CLINIC | Age: 59
End: 2019-04-17

## 2019-04-17 DIAGNOSIS — F43.0 ACUTE STRESS REACTION: Primary | ICD-10-CM
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
## 2019-07-12 ENCOUNTER — DOCUMENTATION ONLY (OUTPATIENT)
Dept: PEDIATRICS CLINIC | Age: 59
End: 2019-07-12

## 2019-07-12 ENCOUNTER — OFFICE VISIT (OUTPATIENT)
Dept: FAMILY MEDICINE CLINIC | Age: 59
End: 2019-07-12

## 2019-07-12 VITALS
DIASTOLIC BLOOD PRESSURE: 72 MMHG | WEIGHT: 236 LBS | HEIGHT: 67 IN | TEMPERATURE: 98.3 F | OXYGEN SATURATION: 98 % | RESPIRATION RATE: 16 BRPM | BODY MASS INDEX: 37.04 KG/M2 | HEART RATE: 75 BPM | SYSTOLIC BLOOD PRESSURE: 110 MMHG

## 2019-07-12 DIAGNOSIS — Z00.00 ROUTINE GENERAL MEDICAL EXAMINATION AT A HEALTH CARE FACILITY: Primary | ICD-10-CM

## 2019-07-12 DIAGNOSIS — E11.9 DIABETES MELLITUS TYPE 2, NONINSULIN DEPENDENT (HCC): ICD-10-CM

## 2019-07-12 DIAGNOSIS — E55.9 VITAMIN D DEFICIENCY: ICD-10-CM

## 2019-07-12 DIAGNOSIS — E78.00 HYPERCHOLESTEROLEMIA: ICD-10-CM

## 2019-07-12 NOTE — PATIENT INSTRUCTIONS
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
## 2019-07-12 NOTE — PROGRESS NOTES
Chief Complaint   Patient presents with    Complete Physical                1. Have you been to the ER, urgent care clinic since your last visit? Hospitalized since your last visit? No    2. Have you seen or consulted any other health care providers outside of the [address] [place] [address] since your last visit? Include any pap smears or colon screening.  No

## 2019-07-12 NOTE — PROGRESS NOTES
Chief Complaint   Patient presents with    Complete Physical     fasting     Diabetes     follow up     [address] [facility]  Annual CPE fasting and follow up Type 2 NIDDM & Hypercholesterolemia. Admits not eating healthy. No regular exercise. Wt up a few lbs. Complying w/ meds. No side effects. Checks BS's ~ 3-4 x a week  Fasting in AM: 119-160's  2 hrs after lunch: 112  Pre dinner: 132  Bedtime: 126  No hypoglycemia  Feels well in general.      REVIEW OF SYMPTOMS   Review of Systems   Constitutional: Negative. HENT: Negative. Eyes: Negative. Respiratory: Negative. Cardiovascular: Negative. Gastrointestinal: Negative. Genitourinary: Negative. Skin: Negative. Neurological: Negative. Endo/Heme/Allergies: Negative. PROBLEM LIST/MEDICAL HISTORY     Problem List  Date Reviewed: 7/12/2019          Codes Class Noted    Tendonitis, Achilles, left ICD-10-CM: M76.62  ICD-9-CM: 726.71  1/9/2019    Overview Signed 1/9/2019  8:33 AM by Celena Patrick MD     CSI Dr Miriam Hu              History of SCC (squamous cell carcinoma) of skin ICD-10-CM: E77.561  ICD-9-CM: V10.83  1/9/2019    Overview Signed 1/9/2019  9:24 AM by Celena Patrick MD     Skin checks and cancer screenings per [facility]. Dr. Karen Moran             Heel spur, left ICD-10-CM: M77.32  ICD-9-CM: 726.73  9/19/2018    Overview Signed 9/19/2018  8:49 AM by Celena Patrick MD     2/2018, Dr Miriam Hu             Tendonitis, Achilles, right ICD-10-CM: M76.61  ICD-9-CM: 726.71  4/19/2018    Overview Signed 4/19/2018  9:24 AM by Celena Patrick MD     2-2018: Dr Miriam Hu, bone spur chipped             Severe obesity (BMI 35.0-39. 9) with comorbidity ([address] ICD-10-CM: E66.01  ICD-9-CM: 278.01  4/19/2018        Bone spurs of feet  ICD-10-CM: M77.50  ICD-9-CM: 726.91  2/24/2017    Overview Signed 2/24/2017 10:56 AM by Celena Patrick MD     Podiatry Dr Rocael De La O Diabetes mellitus type 2, noninsulin dependent ([facility] ICD-10-CM: E11.9  ICD-9-CM: 250.00  12/10/2015    Overview Signed 12/10/2015  2:37 PM by Radha Mac MD     3/2001             Vitamin D deficiency ICD-10-CM: E55.9  ICD-9-CM: 268.9  12/11/2013    Overview Signed 12/11/2013  1:27 PM by Radha Mac MD     12/2013               Postmenopause, LMP age ~ 45 yo, No HRT ICD-10-CM: Z78.0  ICD-9-CM: V49.81  12/5/2012        History of abnormal Pap smear, s/p Cryotherapy in her 27's ICD-10-CM: Z87.898  ICD-9-CM: V13.29  12/5/2012        Stress incontinence ICD-10-CM: N39.3  ICD-9-CM: Katherin Cydney  12/5/2012    Overview Signed 12/5/2012  9:55 AM by Radha Mac MD     Since child bearing years             Left sided sciatica ICD-10-CM: M54.32  ICD-9-CM: 724.3  12/5/2012    Overview Signed 12/5/2012 10:25 AM by Radha Mac MD     12/2012             S/p colonoscopy with polypectomy and diverticulosis 4/2010 ICD-10-CM: Z98.890  ICD-9-CM: V45.89  4/27/2010    Overview Signed 4/27/2010  9:16 AM by MD Dr Néstor Rodriguez             Hiatal Hernia, Gastritis, mild; BX neg for H. Pylori 4/2010 ICD-10-CM: K29.70  ICD-9-CM: 535.50  4/27/2010    Overview Signed 4/27/2010  9:17 AM by MD Dr Néstor Rodriguez             Right knee pain; damage at medial meniscus; 2010 ICD-10-CM: M25.561  ICD-9-CM: 719.46  4/27/2010    Overview Signed 4/27/2010  9:19 AM by MD Dr Ann-Marie Rodriguez; P.T.              Left foot pain 2009; s/p CSI ICD-10-CM: A31.886  ICD-9-CM: 729.5  4/27/2010    Overview Signed 4/27/2010  9:20 AM by MD Dr Jose Rodriguez             Uterine fibroid ICD-10-CM: D25.9  ICD-9-CM: 218.9  Unknown        Meniere's disease ICD-10-CM: H81.09  ICD-9-CM: 386.00  Unknown    Overview Signed 5/24/2016 12:30 PM by Radha Mac MD     ENT ~ 2003, Dr Sukhdev Alvarado             FH: melanoma ICD-10-CM: Z80.8  ICD-9-CM: V16.8  Unknown    Overview Signed 1/9/2019  9:25 AM by Joshua Mares MD     Skin cancer screenings, Derm Dr Sherry Contreras             Hypercholesterolemia ICD-10-CM: E78.00  ICD-9-CM: 272.0  Unknown        GERD (gastroesophageal reflux disease) ICD-10-CM: K21.9  ICD-9-CM: 530.81  Unknown                  PAST SURGICAL HISTORY     Past Surgical History:   Procedure Laterality Date    HX BREAST BIOPSY Right 6/2014, April Foyer    Benign    HX BREAST LUMPECTOMY   1986    benign fibroadenoma right breast    [address]    Emergency for fetal distress    HX COLONOSCOPY  ~2010    Dr Hanane Ellis; Normal except Diverticulosis, but f/u 5 yrs d/t Fhx    HX COLONOSCOPY  07/11/2016, Dr Hanane Ellis    Polyps, Multiple Diverticulae, Internal Hemorrhoids; f/u 5 yrs    HX DILATION AND CURETTAGE  10/8/14    AUB & Uterine Polyp    HX ENDOSCOPY  7-11-16, EGD, Dr Hanane Ellis    Hiatal Hernia, Grade 1 Esophagitis w/ reflux, Gastritis    HX LAP CHOLECYSTECTOMY  1998    gallstones    HX SKIN BIOPSY  ~2016    SCC excised from chest wall, Dr. Margarita Díaz BX  5/2014    AUB; Negative         MEDICATIONS     Current Outpatient Medications   Medication Sig    ibuprofen/diphenhydramine cit (ADVIL PM PO) Take  by mouth as needed.  metFORMIN (GLUCOPHAGE) 500 mg tablet TAKE 1 TABLET BY MOUTH TWICE A DAY WITH FOOD    liraglutide (VICTOZA 3-FAUSTO) 0.6 mg/0.1 mL (18 mg/3 mL) pnij INJECT 1.8 MG (0.3 ML) BY SUBCUTANEOUS ROUTE DAILY.  Insulin Needles, Disposable, (EASY COMFORT PEN NEEDLES) 32 gauge x 5/32\" ndle Per insurance preference, use 1x/d dx diabetes E11.9 to use with victoza    diclofenac (VOLTAREN) 1 % gel 2 (TWO) GRAM FOUR TIMES DAILY    krill-om-3-dha-epa-phospho-ast (MEGARED OMEGA-3 KRILL OIL) 1,000-230-60 mg cap Take 1 Cap by mouth daily. No current facility-administered medications for this visit.            ALLERGIES Allergies   Allergen Reactions    Glipizide Other (comments)     Frequent hypoglycemia    Other Medication Diarrhea     Intolerant of >1000 mg of Metformin    Phentermine Other (comments)     Dizziness and \"crashes\"    Succinylcholine Other (comments)     Prolonged period of recovery following anesthesia          SOCIAL HISTORY     Social History     Socioeconomic History    Marital status:      Spouse name: Not on file    Number of children: 3    Years of education: Not on file    Highest education level: Not on file   Occupational History    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[employer]    Smoking status: Former Smoker     Packs/day: 1.00     Years: 10.00     Pack years: 10.00     Types: Cigarettes     Last attempt to quit: 1991     Years since quittin.2    Smokeless tobacco: Never Used   Substance and Sexual Activity    Alcohol use: Yes     Comment: 2 glasses wine maybe twice a week    Drug use: No    Sexual activity: Yes     Partners: Male   Other Topics Concern    Caffeine Concern No     Comment: occ coffee, mostly just on weekends    Special Diet No    Exercise No   Social History Narrative    Diabetes: Initial PPV23 - age 59; will need PCV 15 at age 69 yo and PPSV 21 again at 74 yo        IMMUNIZATIONS  Immunization History   Administered Date(s) Administered    Influenza Vaccine 10/16/2014, 10/22/2015, 2017, 2018    Influenza Vaccine (Quad) 10/01/2018    Influenza Vaccine (Quad) Mdck Pf 10/16/2018    Influenza Vaccine Split 2009, 2012    Pneumococcal Polysaccharide (PPSV-23) 2018    TD Vaccine 2012         FAMILY HISTORY     Family History   Problem Relation Age of Onset    COPD Mother         smoker    Colon Polyps Mother 72    Osteoporosis Mother     Stroke Father     Dementia Father         Alzheimer's    Heart Disease Father         PVD    Cancer Sister         melanoma    Diabetes Maternal Grandmother     Cancer Maternal Grandfather         lung    Hypertension Brother         living in his 66's         VITALS     Visit Vitals  /72   Pulse 75   Temp 98.3 °F (36.8 °C) (Oral)   Resp 16   Ht 5' 7\" (1.702 m)   Wt 236 lb (107 kg)   SpO2 98%   BMI 36.96 kg/m²          PHYSICAL EXAMINATION   Physical Exam   Constitutional: She is oriented to person, place, and time and well-developed, well-nourished, and in no distress. HENT:   Right Ear: Tympanic membrane normal.   Left Ear: Tympanic membrane normal.   Nose: Nose normal.   Mouth/Throat: Oropharynx is clear and moist. No oropharyngeal exudate. Eyes: Pupils are equal, round, and reactive to light. Conjunctivae and EOM are normal.   Neck: Neck supple. Carotid bruit is not present. No thyromegaly present. Cardiovascular: Normal rate, regular rhythm and normal heart sounds. No murmur heard. Pulmonary/Chest: Effort normal and breath sounds normal.   Abdominal: Soft. Bowel sounds are normal. She exhibits no distension and no mass. There is no tenderness. Musculoskeletal: Normal range of motion. She exhibits no edema or tenderness. Lymphadenopathy:     She has no cervical adenopathy. Neurological: She is alert and oriented to person, place, and time. Gait normal.   Skin: Skin is warm and dry. Psychiatric: Mood and affect normal.   Vitals reviewed.           DIAGNOSTIC DATA         LABORATORY DATA     Results for orders placed or performed in visit on 01/09/19   CBC W/O DIFF   Result Value Ref Range    WBC 10.0 3.4 - 10.8 x10E3/uL    RBC 4.76 3.77 - 5.28 x10E6/uL    HGB 14.3 11.1 - 15.9 g/dL    HCT 42.7 34.0 - 46.6 %    MCV 90 79 - 97 fL    MCH 30.0 26.6 - 33.0 pg    MCHC 33.5 31.5 - 35.7 g/dL    RDW 13.5 12.3 - 15.4 %    PLATELET 841 238 - 800 x10E3/uL   LIPID PANEL   Result Value Ref Range    Cholesterol, total 190 100 - 199 mg/dL    Triglyceride 165 (H) 0 - 149 mg/dL    HDL Cholesterol 51 >39 mg/dL    VLDL, calculated 33 5 - 40 mg/dL LDL, calculated 106 (H) 0 - 99 mg/dL   METABOLIC PANEL, COMPREHENSIVE   Result Value Ref Range    Glucose 112 (H) 65 - 99 mg/dL    BUN 16 8 - 27 mg/dL    Creatinine 0.84 0.57 - 1.00 mg/dL    GFR est non-AA 76 >59 mL/min/1.73    GFR est AA 87 >59 mL/min/1.73    BUN/Creatinine ratio 19 12 - 28    Sodium 145 (H) 134 - 144 mmol/L    Potassium 4.5 3.5 - 5.2 mmol/L    Chloride 106 96 - 106 mmol/L    CO2 21 20 - 29 mmol/L    Calcium 8.9 8.7 - 10.3 mg/dL    Protein, total 7.3 6.0 - 8.5 g/dL    Albumin 4.6 3.6 - 4.8 g/dL    GLOBULIN, TOTAL 2.7 1.5 - 4.5 g/dL    A-G Ratio 1.7 1.2 - 2.2    Bilirubin, total 0.3 0.0 - 1.2 mg/dL    Alk. phosphatase 74 39 - 117 IU/L    AST (SGOT) 17 0 - 40 IU/L    ALT (SGPT) 25 0 - 32 IU/L   HEMOGLOBIN A1C WITH EAG   Result Value Ref Range    Hemoglobin A1c 6.4 (H) 4.8 - 5.6 %    Estimated average glucose 137 mg/dL   TSH 3RD GENERATION   Result Value Ref Range    TSH 0.798 0.450 - 4.500 uIU/mL   MICROALBUMIN, UR, RAND W/ MICROALB/CREAT RATIO   Result Value Ref Range    Creatinine, urine 178.7 Not Estab. mg/dL    Microalbumin, urine 9.9 Not Estab. ug/mL    Microalb/Creat ratio (ug/mg creat.) 5.5 0.0 - 30.0 mg/g creat   CVD REPORT   Result Value Ref Range    INTERPRETATION Note        Lab Results   Component Value Date/Time    Hemoglobin A1c 6.4 (H) 01/09/2019 09:00 AM    Hemoglobin A1c 6.7 (H) 09/19/2018 09:04 AM    Hemoglobin A1c 6.6 (H) 04/19/2018 09:59 AM    Glucose 112 (H) 01/09/2019 09:00 AM    Microalbumin/Creat ratio (mg/g creat) 6 06/18/2009 09:55 AM    Microalb/Creat ratio (ug/mg creat.) 5.5 01/09/2019 09:00 AM    Microalbumin,urine random 0.68 06/18/2009 09:55 AM    LDL, calculated 106 (H) 01/09/2019 09:00 AM    Creatinine 0.84 01/09/2019 09:00 AM          ASSESSMENT & PLAN       ICD-10-CM ICD-9-CM    1. Routine general medical examination at a health care facility Z00.00 V70.0 LIPID PANEL      METABOLIC PANEL, COMPREHENSIVE      HEMOGLOBIN A1C WITH EAG      VITAMIN D, 25 HYDROXY   2. Diabetes mellitus type 2, noninsulin dependent (HCC) V77.0 261.05 METABOLIC PANEL, COMPREHENSIVE      HEMOGLOBIN A1C WITH EAG   3. Hypercholesterolemia E78.00 272.0 LIPID PANEL   4. Vitamin D deficiency E55.9 268.9 VITAMIN D, 25 HYDROXY     Fasting labs today  Cardiovascular risk and specific lipid/LDL/BS/HgBA1c goals reviewed  Reviewed medications and side effects, doing well on current regimen  Reviewed diet, nutrition, exercise, weight management, BMI/goals, age/risk based screening recommendations, health maintenance & prevention counseling. Cancer screening USPTFS guidelines reviewed w/ pt today. Discussed benefits/positive/negative outcomes of screening based on age/risk stratification. Informed consent for/against screening based on pt's personal hx/risk factors. Updated in history above and health maintenance. Saw gyn Dr. Romayne Carol for well woman visit/gyn exam in March 2019, reportedly normal  Further follow up & other recommendations pending review of labs.  If all remains good and stable, follow up in 6 months, sooner prn

## 2019-07-13 LAB
25(OH)D3+25(OH)D2 SERPL-MCNC: 18.7 NG/ML (ref 30–100)
ALBUMIN SERPL-MCNC: 4.5 G/DL (ref 3.6–4.8)
ALBUMIN/GLOB SERPL: 1.8 {RATIO} (ref 1.2–2.2)
ALP SERPL-CCNC: 69 IU/L (ref 39–117)
ALT SERPL-CCNC: 33 IU/L (ref 0–32)
AST SERPL-CCNC: 27 IU/L (ref 0–40)
BILIRUB SERPL-MCNC: 0.3 MG/DL (ref 0–1.2)
BUN SERPL-MCNC: 12 MG/DL (ref 8–27)
BUN/CREAT SERPL: 14 (ref 12–28)
CALCIUM SERPL-MCNC: 9.3 MG/DL (ref 8.7–10.3)
CHLORIDE SERPL-SCNC: 105 MMOL/L (ref 96–106)
CHOLEST SERPL-MCNC: 194 MG/DL (ref 100–199)
CO2 SERPL-SCNC: 22 MMOL/L (ref 20–29)
CREAT SERPL-MCNC: 0.83 MG/DL (ref 0.57–1)
EST. AVERAGE GLUCOSE BLD GHB EST-MCNC: 146 MG/DL
GLOBULIN SER CALC-MCNC: 2.5 G/DL (ref 1.5–4.5)
GLUCOSE SERPL-MCNC: 121 MG/DL (ref 65–99)
HBA1C MFR BLD: 6.7 % (ref 4.8–5.6)
HDLC SERPL-MCNC: 52 MG/DL
INTERPRETATION, 910389: NORMAL
LDLC SERPL CALC-MCNC: 106 MG/DL (ref 0–99)
POTASSIUM SERPL-SCNC: 4.4 MMOL/L (ref 3.5–5.2)
PROT SERPL-MCNC: 7 G/DL (ref 6–8.5)
SODIUM SERPL-SCNC: 142 MMOL/L (ref 134–144)
TRIGL SERPL-MCNC: 181 MG/DL (ref 0–149)
VLDLC SERPL CALC-MCNC: 36 MG/DL (ref 5–40)

## 2019-07-21 ENCOUNTER — PATIENT MESSAGE (OUTPATIENT)
Dept: FAMILY MEDICINE CLINIC | Age: 59
End: 2019-07-21

## 2019-12-09 ENCOUNTER — OFFICE VISIT (OUTPATIENT)
Dept: FAMILY MEDICINE CLINIC | Age: 59
End: 2019-12-09

## 2019-12-09 VITALS
HEIGHT: 67 IN | DIASTOLIC BLOOD PRESSURE: 74 MMHG | BODY MASS INDEX: 37.64 KG/M2 | OXYGEN SATURATION: 98 % | SYSTOLIC BLOOD PRESSURE: 128 MMHG | HEART RATE: 84 BPM | RESPIRATION RATE: 16 BRPM | WEIGHT: 239.8 LBS | TEMPERATURE: 98.8 F

## 2019-12-09 DIAGNOSIS — E78.00 HYPERCHOLESTEROLEMIA: ICD-10-CM

## 2019-12-09 DIAGNOSIS — E11.9 DIABETES MELLITUS TYPE 2, NONINSULIN DEPENDENT (HCC): Primary | ICD-10-CM

## 2019-12-09 RX ORDER — ACETAMINOPHEN/DIPHENHYDRAMINE 500MG-25MG
TABLET ORAL EVERY EVENING
COMMUNITY
End: 2020-12-10 | Stop reason: ALTCHOICE

## 2019-12-09 NOTE — PROGRESS NOTES
Chief Complaint   Patient presents with    Diabetes     fasting    Cholesterol Problem              1. Have you been to the ER, urgent care clinic since your last visit? Hospitalized since your last visit? No    2. Have you seen or consulted any other health care providers outside of the [address] [place] [address] since your last visit? Include any pap smears or colon screening.  No

## 2019-12-09 NOTE — PROGRESS NOTES
Chief Complaint   Patient presents with    Diabetes     fasting follow up    Cholesterol Problem     fasting follow up     HISTORY OF PRESENT ILLNESS   HPI  Fasting follow up Type 2 NIDDM, Hypercholesterolemia, labs and medication check. Admits eating habits poor. Eating lots of candy, carbs. Eating late at night. No portion control. No exercise. Wt going up. Checks BS's 1-2 x a day a few x a week. Fasting BS range 140's-150's, occ 180 or 200. Pre dinner 110, 125, 133, 135. Complying w/ medications, tolerating w/o reaction or side effects. REVIEW OF SYMPTOMS   Review of Systems   Constitutional: Negative. Eyes: Negative. Respiratory: Negative. Cardiovascular: Negative. Gastrointestinal: Negative. Genitourinary: Negative. Neurological: Negative for dizziness, focal weakness and headaches. Slight tingling in tip of right middle toe   Endo/Heme/Allergies: Negative for polydipsia. PROBLEM LIST/MEDICAL HISTORY     Problem List  Date Reviewed: 12/9/2019          Codes Class Noted    Tendonitis, Achilles, left ICD-10-CM: M76.62  ICD-9-CM: 726.71  1/9/2019    Overview Signed 1/9/2019  8:33 AM by Ten Rivera MD     CSI Dr Sue Dickson              History of SCC (squamous cell carcinoma) of skin ICD-10-CM: E99.588  ICD-9-CM: V10.83  1/9/2019    Overview Signed 1/9/2019  9:24 AM by Ten Rivera MD     Skin checks and cancer screenings per [facility]. Dr. Des Sagastume             Heel spur, left ICD-10-CM: M77.32  ICD-9-CM: 726.73  9/19/2018    Overview Signed 9/19/2018  8:49 AM by Ten Rivera MD     2/2018, Dr Sue Dickson             Tendonitis, Achilles, right ICD-10-CM: M76.61  ICD-9-CM: 726.71  4/19/2018    Overview Signed 4/19/2018  9:24 AM by Ten Rivera MD     2-2018: Dr Sue Dickson, bone spur chipped             Severe obesity (BMI 35.0-39. 9) with comorbidity ([address] ICD-10-CM: E66.01  ICD-9-CM: 278.01  4/19/2018        Bone spurs of feet  ICD-10-CM: M77.50  ICD-9-CM: 726.91  2/24/2017    Overview Signed 2/24/2017 10:56 AM by Crystal Calderon MD     Podiatry Dr Christie Oshea             Diabetes mellitus type 2, noninsulin dependent ([facility] ICD-10-CM: E11.9  ICD-9-CM: 250.00  12/10/2015    Overview Signed 12/10/2015  2:37 PM by Crystal Calderon MD     3/2001             Vitamin D deficiency ICD-10-CM: E55.9  ICD-9-CM: 268.9  12/11/2013    Overview Signed 12/11/2013  1:27 PM by Crystal Calderon MD     12/2013               Postmenopause, LMP age ~ 43 yo, No HRT ICD-10-CM: Z78.0  ICD-9-CM: V49.81  12/5/2012        History of abnormal Pap smear, s/p Cryotherapy in her 29's ICD-10-CM: Z87.898  ICD-9-CM: V13.29  12/5/2012        Stress incontinence ICD-10-CM: N39.3  ICD-9-CM: Steward Cones  12/5/2012    Overview Signed 12/5/2012  9:55 AM by Crystal Calderon MD     Since child bearing years             Left sided sciatica ICD-10-CM: M54.32  ICD-9-CM: 724.3  12/5/2012    Overview Signed 12/5/2012 10:25 AM by Crystal Calderon MD     12/2012             S/p colonoscopy with polypectomy and diverticulosis 4/2010 ICD-10-CM: Z98.890  ICD-9-CM: V45.89  4/27/2010    Overview Signed 4/27/2010  9:16 AM by MD Dr Scarlett Mtz             Hiatal Hernia, Gastritis, mild; BX neg for H. Pylori 4/2010 ICD-10-CM: K29.70  ICD-9-CM: 535.50  4/27/2010    Overview Signed 4/27/2010  9:17 AM by MD Dr Scarlett Mtz             Right knee pain; damage at medial meniscus; 2010 ICD-10-CM: M25.561  ICD-9-CM: 719.46  4/27/2010    Overview Signed 4/27/2010  9:19 AM by MD Dr Rajwinder Mtz; P.T.              Left foot pain 2009; s/p CSI ICD-10-CM: I58.144  ICD-9-CM: 729.5  4/27/2010    Overview Signed 4/27/2010  9:20 AM by MD Dr Christie Mtz             Uterine fibroid ICD-10-CM: D25.9  ICD-9-CM: 218.9  Unknown        Meniere's disease ICD-10-CM: H81.09  ICD-9-CM: 386.00  Unknown    Overview Signed 5/24/2016 12:30 PM by Yin Rutherford MD     ENT ~ 2003, Dr Crystal Vu             FH: melanoma ICD-10-CM: Z80.8  ICD-9-CM: V16.8  Unknown    Overview Signed 1/9/2019  9:25 AM by Yin Rutherford MD     Skin cancer screenings, Derm Dr Yousuf Carrero             Hypercholesterolemia ICD-10-CM: E78.00  ICD-9-CM: 272.0  Unknown        GERD (gastroesophageal reflux disease) ICD-10-CM: K21.9  ICD-9-CM: 530.81  Unknown                  PAST SURGICAL HISTORY     Past Surgical History:   Procedure Laterality Date    HX BREAST BIOPSY Right 6/2014, [employer]    Benign    HX BREAST LUMPECTOMY   1986    benign fibroadenoma right breast    [address]    Emergency for fetal distress    HX COLONOSCOPY  ~2010    Dr Luis Samson; Normal except Diverticulosis, but f/u 5 yrs d/t Fhx    HX COLONOSCOPY  07/11/2016, Dr Luis Samson    Polyps, Multiple Diverticulae, Internal Hemorrhoids; f/u 5 yrs    HX DILATION AND CURETTAGE  10/8/14    AUB & Uterine Polyp    HX ENDOSCOPY  7-11-16, EGD, Dr Luis Samson    Hiatal Hernia, Grade 1 Esophagitis w/ reflux, Gastritis    HX LAP CHOLECYSTECTOMY  1998    gallstones    HX SKIN BIOPSY  ~2016    SCC excised from chest wall, Dr. Corie Beck [place] 9  5/2014    AUB; Negative         MEDICATIONS     Current Outpatient Medications   Medication Sig    diphenhydrAMINE-acetaminophen (TYLENOL PM EXTRA STRENGTH)  mg tab Take  by mouth every evening.  metFORMIN (GLUCOPHAGE) 500 mg tablet TAKE 1 TABLET BY MOUTH TWICE A DAY WITH FOOD    liraglutide (VICTOZA 3-FAUSTO) 0.6 mg/0.1 mL (18 mg/3 mL) pnij INJECT 1.8 MG (0.3 ML) BY SUBCUTANEOUS ROUTE DAILY.     Insulin Needles, Disposable, (EASY COMFORT PEN NEEDLES) 32 gauge x 5/32\" ndle Per insurance preference, use 1x/d dx diabetes E11.9 to use with victoza    diclofenac (VOLTAREN) 1 % gel 2 (TWO) GRAM FOUR TIMES DAILY    krill-om-3-dha-epa-phospho-ast (MEGARED OMEGA-3 KRILL OIL) 1,000-230-60 mg cap Take 1 Cap by mouth daily. No current facility-administered medications for this visit.            ALLERGIES     Allergies   Allergen Reactions    Glipizide Other (comments)     Frequent hypoglycemia    Other Medication Diarrhea     Intolerant of >1000 mg of Metformin    Phentermine Other (comments)     Dizziness and \"crashes\"    Succinylcholine Other (comments)     Prolonged period of recovery following anesthesia          SOCIAL HISTORY     Social History     Socioeconomic History    Marital status:      Spouse name: Not on file    Number of children: 3    Years of education: Not on file    Highest education level: Not on file   Occupational History    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[employer]    Smoking status: Former Smoker     Packs/day: 1.00     Years: 10.00     Pack years: 10.00     Types: Cigarettes     Last attempt to quit: 1991     Years since quittin.6    Smokeless tobacco: Never Used   Substance and Sexual Activity    Alcohol use: Yes     Comment: 2 glasses wine maybe twice a week    Drug use: No    Sexual activity: Yes     Partners: Male   Other Topics Concern    Caffeine Concern No     Comment: occ coffee, mostly just on weekends    Special Diet No     Comment: not eating healthy    Exercise No   Social History Narrative    Diabetes: Initial PPV23 - age 59; will need PCV 13 at age 71 yo and PPSV 21 again at 74 yo        IMMUNIZATIONS  Immunization History   Administered Date(s) Administered    Influenza Vaccine 10/16/2014, 10/22/2015, 2017, 2018, 10/24/2019    Influenza Vaccine (Quad) 10/01/2018    Influenza Vaccine (Quad) Mdck Pf 10/16/2018    Influenza Vaccine (Quad) PF 10/24/2019    Influenza Vaccine Split 2009, 2012    Pneumococcal Polysaccharide (PPSV-23) 2018    TD Vaccine 2012    Zoster Recombinant 2019, 10/17/2019         FAMILY HISTORY     Family History   Problem Relation Age of Onset    COPD Mother         smoker    Colon Polyps Mother 72    Osteoporosis Mother     Stroke Father     Dementia Father         Alzheimer's    Heart Disease Father         PVD    Cancer Sister         melanoma    Diabetes Maternal Grandmother     Cancer Maternal Grandfather         lung    Hypertension Brother         living in his 66's         VITALS     Visit Vitals  /74 (BP 1 Location: Left arm, BP Patient Position: Sitting)   Pulse 84   Temp 98.8 °F (37.1 °C) (Oral)   Resp 16   Ht 5' 7\" (1.702 m)   Wt 239 lb 12.8 oz (108.8 kg)   SpO2 98%   BMI 37.56 kg/m²          PHYSICAL EXAMINATION   Physical Exam  Vitals signs reviewed. Neck:      Musculoskeletal: Neck supple. Cardiovascular:      Rate and Rhythm: Normal rate and regular rhythm. Pulses: Normal pulses. Heart sounds: No murmur. No gallop. Pulmonary:      Effort: Pulmonary effort is normal.      Breath sounds: Normal breath sounds. Abdominal:      General: Bowel sounds are normal. There is no distension. Palpations: There is no mass. Tenderness: There is no tenderness. Musculoskeletal:         General: No swelling or tenderness. Right lower leg: No edema. Left lower leg: No edema. Lymphadenopathy:      Cervical: No cervical adenopathy. Skin:     General: Skin is warm and dry. Neurological:      General: No focal deficit present. Coordination: Coordination normal.      Comments: Diabetic foot exam: Normal monofilament testing B. Skin warm, dry, intact. DP/PT pulses strong and intact. No skin breakdown, wounds, ulcers, lesions.                DIAGNOSTIC DATA         LABORATORY DATA     Results for orders placed or performed in visit on 07/12/19   LIPID PANEL   Result Value Ref Range    Cholesterol, total 194 100 - 199 mg/dL    Triglyceride 181 (H) 0 - 149 mg/dL    HDL Cholesterol 52 >39 mg/dL    VLDL, calculated 36 5 - 40 mg/dL    LDL, calculated 106 (H) 0 - 99 mg/dL   METABOLIC PANEL, COMPREHENSIVE   Result Value Ref Range    Glucose 121 (H) 65 - 99 mg/dL    BUN 12 8 - 27 mg/dL    Creatinine 0.83 0.57 - 1.00 mg/dL    GFR est non-AA 77 >59 mL/min/1.73    GFR est AA 89 >59 mL/min/1.73    BUN/Creatinine ratio 14 12 - 28    Sodium 142 134 - 144 mmol/L    Potassium 4.4 3.5 - 5.2 mmol/L    Chloride 105 96 - 106 mmol/L    CO2 22 20 - 29 mmol/L    Calcium 9.3 8.7 - 10.3 mg/dL    Protein, total 7.0 6.0 - 8.5 g/dL    Albumin 4.5 3.6 - 4.8 g/dL    GLOBULIN, TOTAL 2.5 1.5 - 4.5 g/dL    A-G Ratio 1.8 1.2 - 2.2    Bilirubin, total 0.3 0.0 - 1.2 mg/dL    Alk. phosphatase 69 39 - 117 IU/L    AST (SGOT) 27 0 - 40 IU/L    ALT (SGPT) 33 (H) 0 - 32 IU/L   HEMOGLOBIN A1C WITH EAG   Result Value Ref Range    Hemoglobin A1c 6.7 (H) 4.8 - 5.6 %    Estimated average glucose 146 mg/dL   VITAMIN D, 25 HYDROXY   Result Value Ref Range    VITAMIN D, 25-HYDROXY 18.7 (L) 30.0 - 100.0 ng/mL   CVD REPORT   Result Value Ref Range    INTERPRETATION Note        Lab Results   Component Value Date/Time    Hemoglobin A1c 6.7 (H) 07/12/2019 10:16 AM    Hemoglobin A1c 6.4 (H) 01/09/2019 09:00 AM    Hemoglobin A1c 6.7 (H) 09/19/2018 09:04 AM    Glucose 121 (H) 07/12/2019 10:16 AM    Microalbumin/Creat ratio (mg/g creat) 6 06/18/2009 09:55 AM    Microalb/Creat ratio (ug/mg creat.) 5.5 01/09/2019 09:00 AM    Microalbumin,urine random 0.68 06/18/2009 09:55 AM    LDL, calculated 106 (H) 07/12/2019 10:16 AM    Creatinine 0.83 07/12/2019 10:16 AM          ASSESSMENT & PLAN       ICD-10-CM ICD-9-CM    1. Diabetes mellitus type 2, noninsulin dependent (HCC) E11.9 250.00 LIPID PANEL      METABOLIC PANEL, BASIC      HEMOGLOBIN A1C WITH EAG      AST      ALT      MICROALBUMIN, UR, RAND W/ MICROALB/CREAT RATIO   2.  Hypercholesterolemia E78.00 272.0 LIPID PANEL      METABOLIC PANEL, BASIC      TSH 3RD GENERATION     Fasting labs today  Cardiovascular risk and specific lipid/LDL/BS/HgbA1c goals reviewed  Reviewed medications and side effects in detail  Specific diabetic recommendations: foot care discussed and Podiatry visits discussed and long term diabetic complications discussed  Statin therapy discussed. Patient amenable to this after review of today's labs if LDL not <100. Would start Zocor 20 mg qpm.  Reviewed diet, nutrition, exercise, weight management, BMI/goals, age/risk based screening recommendations, health maintenance & prevention counseling. Cancer screening USPTFS guidelines reviewed w/ pt today. Discussed benefits/positive/negative outcomes of screening based on age/risk stratification. Informed consent for/against screening based on pt's personal hx/risk factors. Updated in history above and health maintenance. Referred to nutritionist again. Further follow up & other recommendations pending review of labs.   Spent >50% of today's 25 min enc on diabetes counseling

## 2019-12-09 NOTE — PATIENT INSTRUCTIONS
Diabetes Foot Health: Care Instructions  Your Care Instructions    When you have diabetes, your feet need extra care and attention. Diabetes can damage the nerve endings and blood vessels in your feet, making you less likely to notice when your feet are injured. Diabetes also limits your body's ability to fight infection and get blood to areas that need it. If you get a minor foot injury, it could become an ulcer or a serious infection. With good foot care, you can prevent most of these problems. Caring for your feet can be quick and easy. Most of the care can be done when you are bathing or getting ready for bed. Follow-up care is a key part of your treatment and safety. Be sure to make and go to all appointments, and call your doctor if you are having problems. It's also a good idea to know your test results and keep a list of the medicines you take. How can you care for yourself at home? · Keep your blood sugar close to normal by watching what and how much you eat, monitoring blood sugar, taking medicines if prescribed, and getting regular exercise. · Do not smoke. Smoking affects blood flow and can make foot problems worse. If you need help quitting, talk to your doctor about stop-smoking programs and medicines. These can increase your chances of quitting for good. · Eat a diet that is low in fats. High fat intake can cause fat to build up in your blood vessels and decrease blood flow. · Inspect your feet daily for blisters, cuts, cracks, or sores. If you cannot see well, use a mirror or have someone help you. · Take care of your feet:  ? Wash your feet every day. Use warm (not hot) water. Check the water temperature with your wrists or other part of your body, not your feet. ? Dry your feet well. Pat them dry. Do not rub the skin on your feet too hard. Dry well between your toes. If the skin on your feet stays moist, bacteria or a fungus can grow, which can lead to infection. ?  Keep your skin soft. Use moisturizing skin cream to keep the skin on your feet soft and prevent calluses and cracks. But do not put the cream between your toes, and stop using any cream that causes a rash. ? Clean underneath your toenails carefully. Do not use a sharp object to clean underneath your toenails. Use the blunt end of a nail file or other rounded tool. ? Trim and file your toenails straight across to prevent ingrown toenails. Use a nail clipper, not scissors. Use an emery board to smooth the edges. · Change socks daily. Socks without seams are best, because seams often rub the feet. You can find socks for people with diabetes from specialty catalogs. · Look inside your shoes every day for things like gravel or torn linings, which could cause blisters or sores. · Buy shoes that fit well:  ? Look for shoes that have plenty of space around the toes. This helps prevent bunions and blisters. ? Try on shoes while wearing the kind of socks you will usually wear with the shoes. ? Avoid plastic shoes. They may rub your feet and cause blisters. Good shoes should be made of materials that are flexible and breathable, such as leather or cloth. ? Break in new shoes slowly by wearing them for no more than an hour a day for several days. Take extra time to check your feet for red areas, blisters, or other problems after you wear new shoes. · Do not go barefoot. Do not wear sandals, and do not wear shoes with very thin soles. Thin soles are easy to puncture. They also do not protect your feet from hot pavement or cold weather. · Have your doctor check your feet during each visit. If you have a foot problem, see your doctor. Do not try to treat an early foot problem at home. Home remedies or treatments that you can buy without a prescription (such as corn removers) can be harmful. · Always get early treatment for foot problems. A minor irritation can lead to a major problem if not properly cared for early.   When should you call for help? Call your doctor now or seek immediate medical care if:    · You have a foot sore, an ulcer or break in the skin that is not healing after 4 days, bleeding corns or calluses, or an ingrown toenail.     · You have blue or black areas, which can mean bruising or blood flow problems.     · You have peeling skin or tiny blisters between your toes or cracking or oozing of the skin.     · You have a fever for more than 24 hours and a foot sore.     · You have new numbness or tingling in your feet that does not go away after you move your feet or change positions.     · You have unexplained or unusual swelling of the foot or ankle.    Watch closely for changes in your health, and be sure to contact your doctor if:    · You cannot do proper foot care. Where can you learn more? Go to http://jose f-mason.info/. Enter A739 in the search box to learn more about \"Diabetes Foot Health: Care Instructions. \"  Current as of: April 16, 2019  Content Version: 12.2  © 4109-2106 [employer]. Care instructions adapted under license by [employer] (which disclaims liability or warranty for this information). If you have questions about a medical condition or this instruction, always ask your healthcare professional. [address] any warranty or liability for your use of this information.

## 2019-12-10 LAB
ALBUMIN/CREAT UR: 15.8 MG/G CREAT (ref 0–30)
ALT SERPL-CCNC: 31 IU/L (ref 0–32)
AST SERPL-CCNC: 27 IU/L (ref 0–40)
BUN SERPL-MCNC: 13 MG/DL (ref 8–27)
BUN/CREAT SERPL: 15 (ref 12–28)
CALCIUM SERPL-MCNC: 9.3 MG/DL (ref 8.7–10.3)
CHLORIDE SERPL-SCNC: 104 MMOL/L (ref 96–106)
CHOLEST SERPL-MCNC: 222 MG/DL (ref 100–199)
CO2 SERPL-SCNC: 21 MMOL/L (ref 20–29)
CREAT SERPL-MCNC: 0.87 MG/DL (ref 0.57–1)
CREAT UR-MCNC: 210.8 MG/DL
EST. AVERAGE GLUCOSE BLD GHB EST-MCNC: 160 MG/DL
GLUCOSE SERPL-MCNC: 155 MG/DL (ref 65–99)
HBA1C MFR BLD: 7.2 % (ref 4.8–5.6)
HDLC SERPL-MCNC: 51 MG/DL
INTERPRETATION, 910389: NORMAL
LDLC SERPL CALC-MCNC: 121 MG/DL (ref 0–99)
MICROALBUMIN UR-MCNC: 33.3 UG/ML
POTASSIUM SERPL-SCNC: 4.2 MMOL/L (ref 3.5–5.2)
SODIUM SERPL-SCNC: 141 MMOL/L (ref 134–144)
TRIGL SERPL-MCNC: 252 MG/DL (ref 0–149)
TSH SERPL DL<=0.005 MIU/L-ACNC: 0.78 UIU/ML (ref 0.45–4.5)
VLDLC SERPL CALC-MCNC: 50 MG/DL (ref 5–40)

## 2019-12-29 ENCOUNTER — PATIENT MESSAGE (OUTPATIENT)
Dept: FAMILY MEDICINE CLINIC | Age: 59
End: 2019-12-29

## 2020-01-03 ENCOUNTER — TELEPHONE (OUTPATIENT)
Dept: FAMILY MEDICINE CLINIC | Age: 60
End: 2020-01-03

## 2020-01-03 NOTE — TELEPHONE ENCOUNTER
----- Message from Hilda Ortega sent at 1/1/2020 10:16 PM EST -----  Regarding: RE:Lab Results & Medication Change Recommendations  Contact: [phone]  Thanks for the information. I'm ready to try the Synjardy XR. My pharmacy is [facility].  ----- Message -----  From: Shahida Moody MD  Sent: 1/1/2020  3:40 PM EST  To: Hilda Ortega  Subject: RE:Lab Results & Medication Change Recommendations  Happy New Year! I would be starting you on the Synjardy 10/1000 mg, 1 tablet once daily in the morning w/ breakfast. It is a 24 hour medication so it is designed to help control your blood sugars regardless of time of day. As for the cholesterol medication, I wasn't going to start you on 2 new medications at once, wanted to see how you would do w/ one at a time to be sure you tolerated things ok. Also the [place] has shown cardiovascular benefits, so that is going to help lower your heart risk as well. We need to reassess things again in 3 months and discuss adding cholesterol medication again at that time once we make sure you are doing ok on the [place], which I think you will do really well with. Hope that information helps! See you soon!      ----- Message -----     From: Hilda Ortega     Sent: 12/30/2019  9:05 PM EST       To: Shahida Moody MD  Subject: RE:Lab Results & Medication Change Recommendations    Dr. Mireya Pacheco,  Thanks for email. Have questions about new med, Synjardy. What is your recommended dosage for Synjardy? I've done a little reading and saw 2 strengths. Also, what  time of day would I take it? I'm asking because my blood sugar tends to rise overnight and I'm currently taking Victoza in the morning. Wondering if this is the best time of day and/or if there should be any changes with the new medication. Finally, we talked at my appointment about a cholesterol medicine and was wondering about your thoughts on that.      Thanks,  Rin Flores  ----- Message -----  From: Rafia Wisdom MD  Sent: 12/29/2019  5:37 PM EST  To: Sudarshan Ortega  Subject: Lab Results & Medication Change Recommendations  Good evening,  I hope you have been able to access your labs online successfully. Your urine, liver, kidney, electrolytes and thyroid are normal.  Your LDL (\"bad cholesterol\") went up to 121 (goal < 100) and your triglycerides worsened at 22 (goal < 150). Your HDL (\"good cholesterol\") remains good at 51 (goal >50). Your blood sugar and HgbA1c (3 month sugar average) are high and have worsened from last check. The HgbA1c is the highest reading you have had over the past few years. I recommend adjusting your medication for better blood sugar control. There is a medication called Du[place] that would take the place of your Metformin. It is a combination of an extended release form of your Metformin plus another medication called Jardiance. For you, it will be easier because it is one pill once a day w/ a meal and not twice a day like what you are currently doing w/ the Metformin regular release. It also has the added benefit of more weight loss. You would still continue the Victoza. Let me know after you have reviewed this message and verify which pharmacy you would want this sent to. As soon as you pick it up, start taking it once a day in place of your old Metformin. Arrange another fasting follow up w/ me for 3 months to reassess. Look forward to hearing back from you. Happy new year!   Dr. Minesh Holcomb

## 2020-02-19 ENCOUNTER — TELEPHONE (OUTPATIENT)
Dept: FAMILY MEDICINE CLINIC | Age: 60
End: 2020-02-19

## 2020-02-19 NOTE — TELEPHONE ENCOUNTER
.Pharmacy is requesting a 180 day supply refill on a new medication. METFORMIN  MG TABLET  (Rx was not listed on medication list)          . Pharmacy on file verified            LOV: Monday, December 09, 2019  UOV:  Monday, March 30, 2020

## 2020-02-19 NOTE — TELEPHONE ENCOUNTER
Pharm is requesting a refill on the following meds. Pharm on file verified. ACCU-CHEK [place] PLUS TEST STRIP   To test the blood sugar once a day.

## 2020-03-26 ENCOUNTER — OFFICE VISIT (OUTPATIENT)
Dept: FAMILY MEDICINE CLINIC | Age: 60
End: 2020-03-26

## 2020-03-26 VITALS
SYSTOLIC BLOOD PRESSURE: 122 MMHG | OXYGEN SATURATION: 97 % | HEART RATE: 82 BPM | HEIGHT: 67 IN | WEIGHT: 228.4 LBS | TEMPERATURE: 97.9 F | DIASTOLIC BLOOD PRESSURE: 70 MMHG | RESPIRATION RATE: 16 BRPM | BODY MASS INDEX: 35.85 KG/M2

## 2020-03-26 DIAGNOSIS — K44.9 HIATAL HERNIA WITH GERD AND ESOPHAGITIS: ICD-10-CM

## 2020-03-26 DIAGNOSIS — K21.00 HIATAL HERNIA WITH GERD AND ESOPHAGITIS: ICD-10-CM

## 2020-03-26 DIAGNOSIS — E78.2 MIXED HYPERLIPIDEMIA: ICD-10-CM

## 2020-03-26 DIAGNOSIS — E11.9 DIABETES MELLITUS TYPE 2, NONINSULIN DEPENDENT (HCC): Primary | ICD-10-CM

## 2020-03-26 RX ORDER — OMEPRAZOLE 20 MG/1
20 CAPSULE, DELAYED RELEASE ORAL
Qty: 90 CAP | Refills: 1 | Status: SHIPPED | OUTPATIENT
Start: 2020-03-26 | End: 2020-09-11

## 2020-03-26 RX ORDER — DICLOFENAC SODIUM 75 MG/1
75 TABLET, DELAYED RELEASE ORAL
COMMUNITY
End: 2021-06-14

## 2020-03-26 NOTE — PATIENT INSTRUCTIONS

## 2020-03-26 NOTE — PROGRESS NOTES
Chief Complaint   Patient presents with    Diabetes     fasting follow up    Cholesterol Problem     HISTORY OF PRESENT ILLNESS   HPI  Diabetes/Cholesterol Check  Diet/Exercise: detailed in Social Hx below    Most Recent Weight Trends: Wt Readings from Last 3 Encounters:   20 228 lb 6.4 oz (103.6 kg)   19 239 lb 12.8 oz (108.8 kg)   19 236 lb (107 kg)       Home Glucose Monitoring:  Testing Frequency: 1-2 x a day  Home Blood Sugar Readings:   Fastin, 120's-140's mostly, still occ 170's-180's but not as often as it had been, this    Pre-Meal: 117, 114, 117, 115   2-3 hr Post Prandial:   Bedtime:110  Symptomatic Low BS's: no    Diabetes HgbA1c Trends:  Lab Results   Component Value Date/Time    Hemoglobin A1c 7.2 (H) 2019 08:53 AM    Hemoglobin A1c 6.7 (H) 2019 10:16 AM    Hemoglobin A1c 6.4 (H) 2019 09:00 AM       Related Medications:  Key Antihyperglycemic Medications             liraglutide (VICTOZA) 0.6 mg/0.1 mL (18 mg/3 mL) pnij (Taking) INJECT 1.8 MG (0.3 ML) SUBCUTANEOUSLY DAILY. empagliflozin-metformin (SYNJARDY XR) 10-1,000 mg TBph (Taking) Take 1 Tab by mouth daily (with breakfast). Medication compliance: yes  Diabetes medication side effects: reviewed potential side effects, pt denies any s/sx    Diabetes Health Maintenance Foot and Eye Exams:   Diabetic Foot and Eye Exam  Status   Topic Date Due    Eye Exam  2020    Diabetic Foot Care  2020       Patient w/ h/o hiatal hernia, reflux and esophagitis. Not taking any PPI's in a long while because her symptoms had been pretty well controlled and concern about potential long term side effects. The past year she has been having intermittent, episodic lower substernal chest discomfort, indigestion, heartburn and acid reflux. She has been taking Diclofenac once daily per ortho for knee pain. The past few months her gi/chest symptoms have been more noticeable.   She has not been taking anything for her symptoms. REVIEW OF SYMPTOMS   Review of Systems   Constitutional: Negative. HENT: Negative. Negative for sore throat. Eyes: Negative. Respiratory: Negative. Negative for cough and shortness of breath. Cardiovascular: Negative for palpitations. Gastrointestinal: Positive for heartburn. Negative for abdominal pain, blood in stool, constipation, diarrhea, nausea and vomiting. Genitourinary: Negative. Neurological: Negative. Endo/Heme/Allergies: Negative. Psychiatric/Behavioral: Negative. PROBLEM LIST/MEDICAL HISTORY     Problem List  Date Reviewed: 3/26/2020          Codes Class Noted    Mixed hyperlipidemia ICD-10-CM: E78.2  ICD-9-CM: 272.2  3/26/2020        Tendonitis, Achilles, left ICD-10-CM: M76.62  ICD-9-CM: 726.71  1/9/2019    Overview Signed 1/9/2019  8:33 AM by Crow Craig MD     CSI Dr Jerman Zaidi              History of SCC (squamous cell carcinoma) of skin ICD-10-CM: Q83.672  ICD-9-CM: V10.83  1/9/2019    Overview Signed 1/9/2019  9:24 AM by Crow Craig MD     Skin checks and cancer screenings per [facility]. Dr. Johna Cushing             Heel spur, left ICD-10-CM: M77.32  ICD-9-CM: 726.73  9/19/2018    Overview Signed 9/19/2018  8:49 AM by Crow Craig MD     2/2018, Dr Jerman Zaidi             Tendonitis, Achilles, right ICD-10-CM: M76.61  ICD-9-CM: 726.71  4/19/2018    Overview Signed 4/19/2018  9:24 AM by Crow Craig MD     2-2018: Dr Jerman Zaidi, bone spur chipped             Severe obesity (BMI 35.0-39. 9) with comorbidity ([facility] [address] ICD-10-CM: E66.01  ICD-9-CM: 278.01  4/19/2018        Bone spurs of feet  ICD-10-CM: M77.50  ICD-9-CM: 726.91  2/24/2017    Overview Signed 2/24/2017 10:56 AM by Crow Craig MD     Podiatry Dr Felix Khalil             Diabetes mellitus type 2, noninsulin dependent ([facility] [address] ICD-10-CM: E11.9  ICD-9-CM: 250.00  12/10/2015    Overview Signed 12/10/2015  2:37 PM by Hilton Diaz MD     3/2001             Vitamin D deficiency ICD-10-CM: E55.9  ICD-9-CM: 268.9  12/11/2013    Overview Signed 12/11/2013  1:27 PM by Hilton Diaz MD     12/2013               Postmenopause, LMP age ~ 43 yo, No HRT ICD-10-CM: Z78.0  ICD-9-CM: V49.81  12/5/2012        History of abnormal Pap smear, s/p Cryotherapy in her 27's ICD-10-CM: Z87.898  ICD-9-CM: V13.29  12/5/2012        Stress incontinence ICD-10-CM: N39.3  ICD-9-CM: [place]  12/5/2012    Overview Signed 12/5/2012  9:55 AM by Hilton Diaz MD     Since child bearing years             Left sided sciatica ICD-10-CM: M54.32  ICD-9-CM: 724.3  12/5/2012    Overview Signed 12/5/2012 10:25 AM by Hilton Diaz MD     12/2012             S/p colonoscopy with polypectomy and diverticulosis 4/2010 ICD-10-CM: Z98.890  ICD-9-CM: V45.89  4/27/2010    Overview Signed 4/27/2010  9:16 AM by MD Dr Barbara Figueroa             Hiatal Hernia, Gastritis, mild; BX neg for H. Pylori 4/2010 ICD-10-CM: K29.70  ICD-9-CM: 535.50  4/27/2010    Overview Signed 4/27/2010  9:17 AM by MD Dr Barbara Figueroa             Right knee pain; damage at medial meniscus; 2010 ICD-10-CM: M25.561  ICD-9-CM: 719.46  4/27/2010    Overview Signed 4/27/2010  9:19 AM by MD Dr Shannon Figueroa; P.T.              Left foot pain 2009; s/p CSI ICD-10-CM: W58.667  ICD-9-CM: 729.5  4/27/2010    Overview Signed 4/27/2010  9:20 AM by MD Dr Shanta Figueroa             Uterine fibroid ICD-10-CM: D25.9  ICD-9-CM: 218.9  Unknown        Meniere's disease ICD-10-CM: H81.09  ICD-9-CM: 386.00  Unknown    Overview Signed 5/24/2016 12:30 PM by Hilton Diaz MD     ENT ~ 2003, Dr Alessandra Correa             FH: melanoma ICD-10-CM: Z80.8  ICD-9-CM: V16.8  Unknown    Overview Signed 1/9/2019  9:25 AM by Hilton Diaz MD     Skin cancer screenings, Derm Dr Batool Davis GERD (gastroesophageal reflux disease) ICD-10-CM: K21.9  ICD-9-CM: 530.81  Unknown                  PAST SURGICAL HISTORY     Past Surgical History:   Procedure Laterality Date    HX BREAST BIOPSY Right 6/2014, Neymra Mcnamara    Benign    HX BREAST LUMPECTOMY   1986    benign fibroadenoma right breast    [address]    Emergency for fetal distress    HX COLONOSCOPY  ~2010    Dr Jo Frank; Normal except Diverticulosis, but f/u 5 yrs d/t Fhx    HX COLONOSCOPY  07/11/2016, Dr Jo Frank    Polyps, Multiple Diverticulae, Internal Hemorrhoids; f/u 5 yrs    HX DILATION AND CURETTAGE  10/8/14    AUB & Uterine Polyp    HX ENDOSCOPY  7-11-16, EGD, Dr Jo Frank    Hiatal Hernia, Grade 1 Esophagitis w/ reflux, Gastritis    HX LAP CHOLECYSTECTOMY  1998    gallstones    HX SKIN BIOPSY  ~2016    SCC excised from chest wall, Dr. Dickerson  BX  5/2014    AUB; Negative         MEDICATIONS     Current Outpatient Medications   Medication Sig    diclofenac EC (VOLTAREN) 75 mg EC tablet Take 75 mg by mouth daily as needed. Per ortho for knee    liraglutide (VICTOZA) 0.6 mg/0.1 mL (18 mg/3 mL) pnij INJECT 1.8 MG (0.3 ML) SUBCUTANEOUSLY DAILY.  glucose blood VI test strips (ACCU-CHEK [place] PLUS TEST STRP) strip Per insurance preference, test 1x/d dx diabetes E11.9    pen needle, diabetic (NOVOTWIST) 32 gauge x 1/5\" ndle Per insurance preference, test 1x/d dx diabetes E11.9    empagliflozin-metformin (SYNJARDY XR) 10-1,000 mg TBph Take 1 Tab by mouth daily (with breakfast).  diphenhydrAMINE-acetaminophen (TYLENOL PM EXTRA STRENGTH)  mg tab Take  by mouth every evening.  diclofenac (VOLTAREN) 1 % gel 2 (TWO) GRAM FOUR TIMES DAILY    krill-om-3-dha-epa-phospho-ast (MEGARED OMEGA-3 KRILL OIL) 1,000-230-60 mg cap Take 1 Cap by mouth daily. No current facility-administered medications for this visit.            ALLERGIES     Allergies Allergen Reactions    Glipizide Other (comments)     Frequent hypoglycemia    Other Medication Diarrhea     Intolerant of >1000 mg of Metformin    Phentermine Other (comments)     Dizziness and \"crashes\"    Succinylcholine Other (comments)     Prolonged period of recovery following anesthesia          SOCIAL HISTORY     Social History     Socioeconomic History    Marital status:      Spouse name: Not on file    Number of children: 3    Years of education: Not on file    Highest education level: Not on file   Occupational History    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[employer]    Smoking status: Former Smoker     Packs/day: 1.00     Years: 10.00     Pack years: 10.00     Types: Cigarettes     Last attempt to quit: 1991     Years since quittin.9    Smokeless tobacco: Never Used   Substance and Sexual Activity    Alcohol use: Yes     Comment: 2 glasses wine maybe twice a week    Drug use: No    Sexual activity: Yes     Partners: Male   Other Topics Concern    Caffeine Concern No     Comment: occ coffee, mostly just on weekends    Special Diet No     Comment: cut back on sugars mostly but nothing else special    Exercise No   Social History Narrative    Diabetes: Initial PPV23 - age 59; will need PCV 13 at age 69 yo and PPSV 21 again at 74 yo        IMMUNIZATIONS  Immunization History   Administered Date(s) Administered    Influenza Vaccine 10/16/2014, 10/22/2015, 2017, 2018, 10/24/2019    Influenza Vaccine (Quad) 10/01/2018    Influenza Vaccine (Quad) Mdck Pf 10/16/2018    Influenza Vaccine (Quad) PF 10/24/2019    Influenza Vaccine Split 2009, 2012    Pneumococcal Polysaccharide (PPSV-23) 2018    TD Vaccine 2012    Zoster Recombinant 2019, 10/17/2019         FAMILY HISTORY     Family History   Problem Relation Age of Onset    COPD Mother         smoker    Colon Polyps Mother 72    Osteoporosis Mother     Stroke Father     Dementia Father         Alzheimer's    Heart Disease Father         PVD    Cancer Sister         melanoma    Diabetes Maternal Grandmother     Cancer Maternal Grandfather         lung    Hypertension Brother         living in his 66's         VITALS     Visit Vitals  /70 (BP 1 Location: Left arm, BP Patient Position: Sitting)   Pulse 82   Temp 97.9 °F (36.6 °C) (Oral)   Resp 16   Ht 5' 7\" (1.702 m)   Wt 228 lb 6.4 oz (103.6 kg)   SpO2 97%   BMI 35.77 kg/m²          PHYSICAL EXAMINATION   Physical Exam  Vitals signs reviewed. Constitutional:       General: She is not in acute distress. Appearance: She is obese. Eyes:      General: No scleral icterus. Neck:      Musculoskeletal: Neck supple. Thyroid: No thyroid mass, thyromegaly or thyroid tenderness. Vascular: No carotid bruit. Cardiovascular:      Rate and Rhythm: Normal rate and regular rhythm. Heart sounds: Normal heart sounds. No murmur. No gallop. Pulmonary:      Effort: Pulmonary effort is normal.      Breath sounds: Normal breath sounds. Abdominal:      Palpations: Abdomen is soft. Tenderness: There is no abdominal tenderness. Musculoskeletal:         General: No swelling or tenderness. Right lower leg: No edema. Left lower leg: No edema. Lymphadenopathy:      Cervical: No cervical adenopathy. Skin:     General: Skin is warm and dry.              DIAGNOSTIC DATA         LABORATORY DATA     Results for orders placed or performed in visit on 12/09/19   LIPID PANEL   Result Value Ref Range    Cholesterol, total 222 (H) 100 - 199 mg/dL    Triglyceride 252 (H) 0 - 149 mg/dL    HDL Cholesterol 51 >39 mg/dL    VLDL, calculated 50 (H) 5 - 40 mg/dL    LDL, calculated 121 (H) 0 - 99 mg/dL   METABOLIC PANEL, BASIC   Result Value Ref Range    Glucose 155 (H) 65 - 99 mg/dL    BUN 13 8 - 27 mg/dL    Creatinine 0.87 0.57 - 1.00 mg/dL    GFR est non-AA 72 >59 mL/min/1.73    GFR est AA 83 >59 mL/min/1.73 BUN/Creatinine ratio 15 12 - 28    Sodium 141 134 - 144 mmol/L    Potassium 4.2 3.5 - 5.2 mmol/L    Chloride 104 96 - 106 mmol/L    CO2 21 20 - 29 mmol/L    Calcium 9.3 8.7 - 10.3 mg/dL   HEMOGLOBIN A1C WITH EAG   Result Value Ref Range    Hemoglobin A1c 7.2 (H) 4.8 - 5.6 %    Estimated average glucose 160 mg/dL   AST   Result Value Ref Range    AST (SGOT) 27 0 - 40 IU/L   ALT   Result Value Ref Range    ALT (SGPT) 31 0 - 32 IU/L   TSH 3RD GENERATION   Result Value Ref Range    TSH 0.781 0.450 - 4.500 uIU/mL   MICROALBUMIN, UR, RAND W/ MICROALB/CREAT RATIO   Result Value Ref Range    Creatinine, urine 210.8 Not Estab. mg/dL    Microalbumin, urine 33.3 Not Estab. ug/mL    Microalb/Creat ratio (ug/mg creat.) 15.8 0.0 - 30.0 mg/g creat   CVD REPORT   Result Value Ref Range    INTERPRETATION Note        Lab Results   Component Value Date/Time    Hemoglobin A1c 7.2 (H) 12/09/2019 08:53 AM    Hemoglobin A1c 6.7 (H) 07/12/2019 10:16 AM    Hemoglobin A1c 6.4 (H) 01/09/2019 09:00 AM    Glucose 155 (H) 12/09/2019 08:53 AM    Microalbumin/Creat ratio (mg/g creat) 6 06/18/2009 09:55 AM    Microalb/Creat ratio (ug/mg creat.) 15.8 12/09/2019 08:53 AM    Microalbumin,urine random 0.68 06/18/2009 09:55 AM    LDL, calculated 121 (H) 12/09/2019 08:53 AM    Creatinine 0.87 12/09/2019 08:53 AM          ASSESSMENT & PLAN       ICD-10-CM ICD-9-CM    1. Diabetes mellitus type 2, noninsulin dependent (HCC) E11.9 250.00 HEMOGLOBIN A1C WITH EAG      METABOLIC PANEL, COMPREHENSIVE   2. Mixed hyperlipidemia E78.2 272.2 LIPID PANEL   3. Hiatal hernia with GERD and esophagitis K44.9 553.3 omeprazole (PRILOSEC) 20 mg capsule    K21.0 530.11      Fasting labs today  Cardiovascular risk and specific lipid/LDL/BS/HgBA1c goals reviewed  Reviewed diet, nutrition, exercise, weight management, BMI/goals, age/risk based screening recommendations, health maintenance & prevention counseling. Cancer screening USPTFS guidelines reviewed w/ pt today. Discussed benefits/positive/negative outcomes of screening based on age/risk stratification. Informed consent for/against screening based on pt's personal hx/risk factors. Updated in history above and health maintenance. Reviewed medications, effects, risks/benefits, precautions and potential side effects in detail  Continues on Victoza and started on Synjardy a few months ago and tolerating well. Happy to see her wt down 11 lbs in 3 months since starting on it and her home BS's are showing improvement as well  Hiatal hernia/gerd/esophagitis counseling, trigger avoidance, preventive measures discussed; red flag s/sx discussed which would warrant follow up w/ her GI including any obstructive sx, constitutional sx, gi blood loss, or any persistent or worsening sx  Start Prilosec 20 mg qam  DC Diclofenac and change to Tylenol Arthritis 1-2 x a day prn knee pain  Further follow up & other recommendations pending review of today's labs as well.  If all stable, follow up 6months, sooner prn

## 2020-03-26 NOTE — PROGRESS NOTES
Chief Complaint   Patient presents with    Diabetes     fasting follow up    Cholesterol Problem     1. Have you been to the ER, urgent care clinic since your last visit? Hospitalized since your last visit? No    2. Have you seen or consulted any other health care providers outside of the [address] [place] [address] since your last visit? Include any pap smears or colon screening.  Yes Where: ortho Dr Tatianna Blake couple of weeks ago

## 2020-03-27 LAB
ALBUMIN SERPL-MCNC: 4.8 G/DL (ref 3.8–4.8)
ALBUMIN/GLOB SERPL: 1.8 {RATIO} (ref 1.2–2.2)
ALP SERPL-CCNC: 76 IU/L (ref 39–117)
ALT SERPL-CCNC: 29 IU/L (ref 0–32)
AST SERPL-CCNC: 23 IU/L (ref 0–40)
BILIRUB SERPL-MCNC: 0.4 MG/DL (ref 0–1.2)
BUN SERPL-MCNC: 15 MG/DL (ref 8–27)
BUN/CREAT SERPL: 17 (ref 12–28)
CALCIUM SERPL-MCNC: 9.9 MG/DL (ref 8.7–10.3)
CHLORIDE SERPL-SCNC: 102 MMOL/L (ref 96–106)
CHOLEST SERPL-MCNC: 211 MG/DL (ref 100–199)
CO2 SERPL-SCNC: 23 MMOL/L (ref 20–29)
CREAT SERPL-MCNC: 0.87 MG/DL (ref 0.57–1)
EST. AVERAGE GLUCOSE BLD GHB EST-MCNC: 146 MG/DL
GLOBULIN SER CALC-MCNC: 2.6 G/DL (ref 1.5–4.5)
GLUCOSE SERPL-MCNC: 141 MG/DL (ref 65–99)
HBA1C MFR BLD: 6.7 % (ref 4.8–5.6)
HDLC SERPL-MCNC: 47 MG/DL
INTERPRETATION, 910389: NORMAL
LDLC SERPL CALC-MCNC: 110 MG/DL (ref 0–99)
POTASSIUM SERPL-SCNC: 4.7 MMOL/L (ref 3.5–5.2)
PROT SERPL-MCNC: 7.4 G/DL (ref 6–8.5)
SODIUM SERPL-SCNC: 143 MMOL/L (ref 134–144)
TRIGL SERPL-MCNC: 270 MG/DL (ref 0–149)
VLDLC SERPL CALC-MCNC: 54 MG/DL (ref 5–40)

## 2020-03-29 ENCOUNTER — PATIENT MESSAGE (OUTPATIENT)
Dept: FAMILY MEDICINE CLINIC | Age: 60
End: 2020-03-29

## 2020-03-31 DIAGNOSIS — E11.9 DIABETES MELLITUS TYPE 2, NONINSULIN DEPENDENT (HCC): Primary | ICD-10-CM

## 2020-03-31 RX ORDER — EMPAGLIFLOZIN, METFORMIN HYDROCHLORIDE 10; 1000 MG/1; MG/1
1 TABLET, EXTENDED RELEASE ORAL
Qty: 90 EACH | Refills: 1 | Status: SHIPPED | OUTPATIENT
Start: 2020-03-31 | End: 2020-09-11

## 2020-03-31 NOTE — TELEPHONE ENCOUNTER
Last visit 03/06/2020 MD Younger   Next appointment 6 months   Previous refill encounter(s) 01/04/2020 Synjardy XR #90     Requested Prescriptions     Pending Prescriptions Disp Refills    empagliflozin-metformin (Synjardy XR) 10-1,000 mg TBph 90 Each 0     Sig: Take 1 Tab by mouth daily (with breakfast).

## 2020-04-12 ENCOUNTER — TELEPHONE (OUTPATIENT)
Dept: FAMILY MEDICINE CLINIC | Age: 60
End: 2020-04-12

## 2020-04-13 NOTE — TELEPHONE ENCOUNTER
Pt states she did see her results but didn't go back on miah to view her recommendations. States she will go back on online to see her notes.

## 2020-04-13 NOTE — TELEPHONE ENCOUNTER
I received an unread message alert that this patient has not checked the results and recommendations I sent via their [employer]. Please either call and leave a message alerting them to check their Patient Portal for email OR mail letter about results and that a full report is in their [employer] account as well. Thanks!    -------------------------------------------------------  From  Christiano Wilburn MD To  Chilango Reid Ortega Sent  3/29/2020  1:10 PM   Good afternoon,     I hope you have been able to access your labs online successfully. Your liver, kidney and electrolytes are normal.     Your HgbA1c (3 month sugar average) has improved nicely from last check! That's great! Continue your regimen of the Victoza and Synjardy for now and work on some of the tips I have included below. Your cholesterol numbers are still high and continue to put you at increased cardiovascular risk for heart attack or stroke. A statin cholesterol medication would significantly help reduce these numbers and hence significantly reduce your risk of one of these major cardiovascular events which continue to be the leading causes of death in the [place]. I strongly recommend you trying once of these medications that are indicated per AHA, ACC, ADA and other major health authorities based on research, clinical trials and epidemiology. The cholesterol lowering medication I would recommend for you is Crestor, taken once daily and is well tolerated and would not interfere w/ your other meds. I know you have been leery of adding medications over time, but please understand the negative health implications that can ensue as a result of untreated cholesterol problems. Your numbers have been high for quite some time now and have continued to gradually worsen over the years. Hope you agree to giving the Crestor a try! I think you would be very pleased and quite surprised at what a difference it can make!    If you start on the medication, I would want to see you back for a fasting follow up in 3 months instead of 6 months. If you opt not to, we will just recheck at your next routine checkup in 6months. Look forward to hearing back from you! Take care and stay well! Dr. Hurt Ran     Meal Planning/Dietary Suggestions:     ~Divide your plate by types of foods. Put non-starchy vegetables on half the plate, meat or other protein food on one-quarter of the plate, and a grain or starchy vegetable in the final quarter of the plate. Keep starches dark & rich in color, avoid white starches in general.   ~Avoid sweets and sugars in general, and reserve them as smaller enjoyable portions on special occasions only. ~Limit carbohydrate intake to no more than one serving per meal and between 30-45 grams of carbs max per meal and no more than 15 grams of carbs per snack. Increase lean protein (if you consume meat limit intake of red meat and stick with lighter meats such as fish, seafood, chicken, turkey)   ~Do not skip meals. ~Eat light snacks between meals that are low in fat and high in protein. ~Avoid saturated fats and fried foods. Stick to mostly baking, broiling, grilling or steaming. Consume a dietary pattern that emphasizes intake of vegetables, fruits (mostly as berries), whole grains, poultry, fish, low fat dairy, legumes, non tropical vegetable oils and nuts. Read food labels, and try to avoid saturated and trans fats. They increase your risk of heart disease. Trans fat is found in many processed foods such as cookies and crackers. Use olive or canola oil when you cook. Try cholesterol-lowering spreads, such as Benecol or Take Control. Choose lean meats instead of high-fat meats such as hot dogs and sausages. Cut off all visible fat when you prepare meat. Eat fish, skinless poultry, and meat alternatives such as soy products instead of high-fat meats. Soy products, such as tofu, may be especially good for your heart.    Choose low-fat or fat-free milk and dairy products. ~Eat fish (if no allergies)   Eat at least two servings of fish a week. Certain fish, such as salmon and tuna, contain omega-3 fatty acids, which may help reduce your risk of heart attack   ~Avoid added salt and if you have a history of high blood pressure also limit sodium intake to < 2 grams per day.     ~Limit alcohol to no more than 1 standard drink per day for women and 2 for men (ie/ 12 oz beer, 5 oz wine, 1.5 oz liquor). ~Avoid tobacco products. ~Limit caffeine to no more than 1-2 small servings per day. ~Reference the ADA (American Diabetes Association Diet) for low carb meal planning. ~Reference the AHA (American Heart Association) Diet for cholesterol lowering meal ideas. ~Reference the DASH diet for low sodium, blood pressure lowering meal planning ideas. ~We can offer referral to one of our nutrition services programs if needed. Exercise:     ~Get at least 30 minutes of exercise on most days of the week. Walking is a good choice. You also may want to do other activities, such as running, swimming, cycling, or playing tennis or team sports based on your current health status, exercise tolerability and other health conditions. But even small amounts of exercise will help you get stronger and have more energy. It can also help you manage your weight and your stress. ~Pick activities that you like--ones that make your heart beat faster, your muscles stronger, and your muscles and joints more flexible. If you find more than one thing you like doing, do them all. You don't have to do the same thing every day. ~Gradually work towards getting regular, moderate intensity cardio/aerobic exercise at least 150 minutes per week ie/ 30-50 minutes 3-4 x a week.  Here are the varieties of exercise you should aim for:   Aerobic or \"cardio\" activities that make your heart beat faster and make you breathe harder, such as brisk walking, riding a bike, or running. Aerobic activities strengthen your heart and lungs and build up your endurance. Strength training activities that make your muscles work against, or \"resist,\" something, such as lifting weights or doing push-ups. These activities help tone and strengthen your muscles. Stretches that allow you to move your joints and muscles through their full range of motion. Stretching helps you be more flexible and avoid injury. Get your heart pumping every day. Any activity that makes your heart beat faster and keeps it at that rate for a while counts.

## 2020-04-20 ENCOUNTER — TELEPHONE (OUTPATIENT)
Dept: FAMILY MEDICINE CLINIC | Age: 60
End: 2020-04-20

## 2020-04-20 DIAGNOSIS — E78.2 MIXED HYPERLIPIDEMIA: Primary | ICD-10-CM

## 2020-04-20 NOTE — TELEPHONE ENCOUNTER
Pt wants generic crestor to [facility] on file    ----- Message from Margot Mir Monynoah sent at 4/17/2020  9:22 PM EDT -----  Regarding: RE:Lab Results & Recommendations  Contact: [phone]  Yes. Thanks MJ            Good afternoon,      I hope you have been able to access your labs online successfully.      Your liver, kidney and electrolytes are normal.     Your HgbA1c (3 month sugar average) has improved nicely from last check! That's great! Continue your regimen of the Victoza and Synjardy for now and work on some of the tips I have included below.     Your cholesterol numbers are still high and continue to put you at increased cardiovascular risk for heart attack or stroke. A statin cholesterol medication would significantly help reduce these numbers and hence significantly reduce your risk of one of these major cardiovascular events which continue to be the leading causes of death in the [place]. I strongly recommend you trying once of these medications that are indicated per AHA, ACC, ADA and other major health authorities based on research, clinical trials and epidemiology.     The cholesterol lowering medication I would recommend for you is Crestor, taken once daily and is well tolerated and would not interfere w/ your other meds. I know you have been leery of adding medications over time, but please understand the negative health implications that can ensue as a result of untreated cholesterol problems. Your numbers have been high for quite some time now and have continued to gradually worsen over the years. Hope you agree to giving the Crestor a try! I think you would be very pleased and quite surprised at what a difference it can make! If you start on the medication, I would want to see you back for a fasting follow up in 3 months instead of 6 months. If you opt not to, we will just recheck at your next routine checkup in 6months.     Look forward to hearing back from you! Take care and stay well!    FE     Meal Planning/Dietary Suggestions:     ~Divide your plate by types of foods. Put non-starchy vegetables on half the plate, meat or other protein food on one-quarter of the plate, and a grain or starchy vegetable in the final quarter of the plate. Keep starches dark & rich in color, avoid white starches in general.  ~Avoid sweets and sugars in general, and reserve them as smaller enjoyable portions on special occasions only. ~Limit carbohydrate intake to no more than one serving per meal and between 30-45 grams of carbs max per meal and no more than 15 grams of carbs per snack. Increase lean protein (if you consume meat limit intake of red meat and stick with lighter meats such as fish, seafood, chicken, turkey)  ~Do not skip meals. ~Eat light snacks between meals that are low in fat and high in protein. ~Avoid saturated fats and fried foods. Stick to mostly baking, broiling, grilling or steaming. Consume a dietary pattern that emphasizes intake of vegetables, fruits (mostly as berries), whole grains, poultry, fish, low fat dairy, legumes, non tropical vegetable oils and nuts. Read food labels, and try to avoid saturated and trans fats. They increase your risk of heart disease. Trans fat is found in many processed foods such as cookies and crackers. Use olive or canola oil when you cook. Try cholesterol-lowering spreads, such as Benecol or Take Control. Choose lean meats instead of high-fat meats such as hot dogs and sausages. Cut off all visible fat when you prepare meat. Eat fish, skinless poultry, and meat alternatives such as soy products instead of high-fat meats. Soy products, such as tofu, may be especially good for your heart. Choose low-fat or fat-free milk and dairy products. ~Eat fish (if no allergies)  Eat at least two servings of fish a week.  Certain fish, such as salmon and tuna, contain omega-3 fatty acids, which may help reduce your risk of heart attack  ~Avoid added salt and if you have a history of high blood pressure also limit sodium intake to < 2 grams per day. ~Limit alcohol to no more than 1 standard drink per day for women and 2 for men (ie/ 12 oz beer, 5 oz wine, 1.5 oz liquor). ~Avoid tobacco products. ~Limit caffeine to no more than 1-2 small servings per day. ~Reference the ADA (American Diabetes Association Diet) for low carb meal planning. ~Reference the AHA (American Heart Association) Diet for cholesterol lowering meal ideas. ~Reference the DASH diet for low sodium, blood pressure lowering meal planning ideas. ~We can offer referral to one of our nutrition services programs if needed.      Exercise:     ~Get at least 30 minutes of exercise on most days of the week. Walking is a good choice. You also may want to do other activities, such as running, swimming, cycling, or playing tennis or team sports based on your current health status, exercise tolerability and other health conditions. But even small amounts of exercise will help you get stronger and have more energy. It can also help you manage your weight and your stress. ~Pick activities that you like--ones that make your heart beat faster, your muscles stronger, and your muscles and joints more flexible. If you find more than one thing you like doing, do them all. You don't have to do the same thing every day. ~Gradually work towards getting regular, moderate intensity cardio/aerobic exercise at least 150 minutes per week ie/ 30-50 minutes 3-4 x a week. Here are the varieties of exercise you should aim for:  Aerobic or \"cardio\" activities that make your heart beat faster and make you breathe harder, such as brisk walking, riding a bike, or running. Aerobic activities strengthen your heart and lungs and build up your endurance. Strength training activities that make your muscles work against, or \"resist,\" something, such as lifting weights or doing push-ups.  These activities help tone and strengthen your muscles. Stretches that allow you to move your joints and muscles through their full range of motion. Stretching helps you be more flexible and avoid injury. Get your heart pumping every day. Any activity that makes your heart beat faster and keeps it at that rate for a while counts.

## 2020-04-21 RX ORDER — ROSUVASTATIN CALCIUM 10 MG/1
10 TABLET, COATED ORAL
Qty: 90 TAB | Refills: 0 | Status: SHIPPED | OUTPATIENT
Start: 2020-04-21 | End: 2020-06-20

## 2020-06-08 ENCOUNTER — VIRTUAL VISIT (OUTPATIENT)
Dept: FAMILY MEDICINE CLINIC | Age: 60
End: 2020-06-08

## 2020-06-08 DIAGNOSIS — E78.2 MIXED HYPERLIPIDEMIA: ICD-10-CM

## 2020-06-08 DIAGNOSIS — E11.9 DIABETES MELLITUS TYPE 2, NONINSULIN DEPENDENT (HCC): Primary | ICD-10-CM

## 2020-06-08 NOTE — PROGRESS NOTES
VIRTUAL VISIT    Chief Complaint   Patient presents with    Cholesterol Problem     Follow Up    Medication Evaluation    Diabetes       HISTORY OF PRESENT ILLNESS   HPI  Diabetes HPI    Diet and Lifestyle: exercises sporadically, tries to stick to sensible diet, has reduced sugars and carbs over time, wt coming down, nonsmoker. Most Recent Weight Trends: Wt Readings from Last 3 Encounters:   20 228 lb 6.4 oz (103.6 kg)   19 239 lb 12.8 oz (108.8 kg)   19 236 lb (107 kg)       Home Glucose Monitoring:  Testing Frequency: once daily a few x a week  Home Blood Sugar Readings:   Fastin, 114, 113, 118, 136, 122, 141, 148, 133   Pre-Meal: before dinner 133, 137   2-3 hr Post Prandial:   Bedtime:   7 day avg 116, 14 day avg 121, 30 day avg 127  Symptomatic Low BS's: no    Diabetes HgbA1c Trends:  Lab Results   Component Value Date/Time    Hemoglobin A1c 6.7 (H) 2020 08:52 AM    Hemoglobin A1c 7.2 (H) 2019 08:53 AM    Hemoglobin A1c 6.7 (H) 2019 10:16 AM       Related Medications:  Key Antihyperglycemic Medications             liraglutide (Victoza 3-Manolo) 0.6 mg/0.1 mL (18 mg/3 mL) pnij (Taking) INJECT 1.8 MG (0.3 ML) SUBCUTANEOUSLY DAILY    empagliflozin-metformin (Synjardy XR) 10-1,000 mg TBph (Taking) Take 1 Tab by mouth daily (with breakfast). Medication compliance: yes  Diabetes medication side effects: no    Diabetes Health Maintenance Foot and Eye Exams:   Diabetic Foot and Eye Exam  Status   Topic Date Due    Eye Exam  2020    Diabetic Foot Care  2020     Cardiovascular HPI    H/O diabetes, hypertension, hyperlipidemia and no known cardiovascular diseases. Wt Readings from Last 3 Encounters:   20 228 lb 6.4 oz (103.6 kg)   19 239 lb 12.8 oz (108.8 kg)   19 236 lb (107 kg)       Key CAD CHF Meds             rosuvastatin (CRESTOR) 10 mg tablet (Taking) Take 1 Tab by mouth nightly.     krill-om-3-dha-epa-phospho-ast Alessandra Kwong OMEGA-3 KRILL OIL) 1,000-230-60 mg cap (Taking) Take 1 Cap by mouth daily. Medication Compliance: yes, started Crestor 4-2020    Cardiovascular Medication Side Effects: no    Home BP Monitoring: is not measured at home. BP Readings from Last 3 Encounters:   03/26/20 122/70   12/09/19 128/74   07/12/19 110/72        REVIEW OF SYMPTOMS   Review of Systems   Constitutional: Negative. Eyes: Negative. Respiratory: Negative. Cardiovascular: Negative. Gastrointestinal: Negative. Genitourinary: Negative. Musculoskeletal: Negative for myalgias. Neurological: Negative. Endo/Heme/Allergies: Negative. PROBLEM LIST/MEDICAL HISTORY     Problem List  Date Reviewed: 6/8/2020          Codes Class Noted    Mixed hyperlipidemia ICD-10-CM: E78.2  ICD-9-CM: 272.2  3/26/2020        Hiatal hernia with GERD and esophagitis ICD-10-CM: K44.9, K21.0  ICD-9-CM: 553.3, 530.11  3/26/2020    Overview Signed 3/26/2020  8:48 AM by Adriane Deng MD     EGD 2016             Tendonitis, Achilles, left ICD-10-CM: M76.62  ICD-9-CM: 726.71  1/9/2019    Overview Signed 1/9/2019  8:33 AM by Adriane Deng MD     I Dr Dion Schuler              History of SCC (squamous cell carcinoma) of skin ICD-10-CM: H05.320  ICD-9-CM: V10.83  1/9/2019    Overview Signed 1/9/2019  9:24 AM by Adriane Deng MD     Skin checks and cancer screenings per [facility]. Dr. Libia Del Toro             Heel spur, left ICD-10-CM: M77.32  ICD-9-CM: 726.73  9/19/2018    Overview Signed 9/19/2018  8:49 AM by Adriane Deng MD     2/2018, Dr Dion Schuler             Tendonitis, Achilles, right ICD-10-CM: M76.61  ICD-9-CM: 726.71  4/19/2018    Overview Signed 4/19/2018  9:24 AM by Adriane Deng MD     2-2018: Dr Dion Schuler, bone spur chipped             Severe obesity (BMI 35.0-39. 9) with comorbidity ([address] ICD-10-CM: E66.01  ICD-9-CM: 278.01  4/19/2018        Bone spurs of feet  ICD-10-CM: M77.50  ICD-9-CM: 726.91  2/24/2017    Overview Signed 2/24/2017 10:56 AM by Yulia Lemon MD     Podiatry Dr Favian Olguin             Diabetes mellitus type 2, noninsulin dependent ([facility] ICD-10-CM: E11.9  ICD-9-CM: 250.00  12/10/2015    Overview Signed 12/10/2015  2:37 PM by Yulia Lemon MD     3/2001             Vitamin D deficiency ICD-10-CM: E55.9  ICD-9-CM: 268.9  12/11/2013    Overview Signed 12/11/2013  1:27 PM by Yulia Lemon MD     12/2013               Postmenopause, LMP age ~ 43 yo, No HRT ICD-10-CM: Z78.0  ICD-9-CM: V49.81  12/5/2012        History of abnormal Pap smear, s/p Cryotherapy in her 27's ICD-10-CM: Z87.898  ICD-9-CM: V13.29  12/5/2012        Stress incontinence ICD-10-CM: N39.3  ICD-9-CM: [place]  12/5/2012    Overview Signed 12/5/2012  9:55 AM by Yulia Lemon MD     Since child bearing years             Left sided sciatica ICD-10-CM: M54.32  ICD-9-CM: 724.3  12/5/2012    Overview Signed 12/5/2012 10:25 AM by Yulia Lemon MD     12/2012             S/p colonoscopy with polypectomy and diverticulosis 4/2010 ICD-10-CM: Z98.890  ICD-9-CM: V45.89  4/27/2010    Overview Signed 4/27/2010  9:16 AM by MD Dr Altagracia Davenport             Hiatal Hernia, Gastritis, mild; BX neg for H. Pylori 4/2010 ICD-10-CM: K29.70  ICD-9-CM: 535.50  4/27/2010    Overview Signed 4/27/2010  9:17 AM by MD Dr Altagracia Davenport             Right knee pain; damage at medial meniscus; 2010 ICD-10-CM: M25.561  ICD-9-CM: 719.46  4/27/2010    Overview Signed 4/27/2010  9:19 AM by MD Dr Sada Davenport; P.T.              Left foot pain 2009; s/p CSI ICD-10-CM: N38.693  ICD-9-CM: 729.5  4/27/2010    Overview Signed 4/27/2010  9:20 AM by MD Dr Favian Davenport             Uterine fibroid ICD-10-CM: D25.9  ICD-9-CM: 218.9  Unknown        Meniere's disease ICD-10-CM: H81.09  ICD-9-CM: 386.00  Unknown Overview Signed 5/24/2016 12:30 PM by Daniel Hartley MD     ENT ~ 2003, Dr Margo Mcdonald             FH: melanoma ICD-10-CM: Z80.8  ICD-9-CM: V16.8  Unknown    Overview Signed 1/9/2019  9:25 AM by Daniel Hartley MD     Skin cancer screenings, Derm Dr Marcus Armenta             GERD (gastroesophageal reflux disease) ICD-10-CM: K21.9  ICD-9-CM: 530.81  Unknown                  PAST SURGICAL HISTORY     Past Surgical History:   Procedure Laterality Date    HX BREAST BIOPSY Right 6/2014, Madelaine Jungt    Benign    HX BREAST LUMPECTOMY   1986    benign fibroadenoma right breast    [address]    Emergency for fetal distress    HX COLONOSCOPY  ~2010    Dr Rosalia Anthony; Normal except Diverticulosis, but f/u 5 yrs d/t Fhx    HX COLONOSCOPY  07/11/2016, Dr Rosalia Anthony    Polyps, Multiple Diverticulae, Internal Hemorrhoids; f/u 5 yrs    HX DILATION AND CURETTAGE  10/8/14    AUB & Uterine Polyp    HX ENDOSCOPY  7-11-16, EGD, Dr Rosalia Anthony    Hiatal Hernia, Grade 1 Esophagitis w/ reflux, Gastritis    HX LAP CHOLECYSTECTOMY  1998    gallstones    HX SKIN BIOPSY  ~2016    SCC excised from chest wall, Dr. Deepa Patel Bygget 9  5/2014    AUB; Negative         MEDICATIONS     Current Outpatient Medications   Medication Sig    OTHER,NON-FORMULARY, Tumeric once daily for \"inflammation\", CBD Oil for sleep    liraglutide (Victoza 3-Manolo) 0.6 mg/0.1 mL (18 mg/3 mL) pnij INJECT 1.8 MG (0.3 ML) SUBCUTANEOUSLY DAILY    rosuvastatin (CRESTOR) 10 mg tablet Take 1 Tab by mouth nightly.  empagliflozin-metformin (Synjardy XR) 10-1,000 mg TBph Take 1 Tab by mouth daily (with breakfast).  omeprazole (PRILOSEC) 20 mg capsule Take 1 Cap by mouth Daily (before breakfast).  krill-om-3-dha-epa-phospho-ast (MEGARED OMEGA-3 KRILL OIL) 1,000-230-60 mg cap Take 1 Cap by mouth daily.  diclofenac EC (VOLTAREN) 75 mg EC tablet Take 75 mg by mouth daily as needed.  Per ortho for knee    glucose blood VI test strips (ACCU-CHEK [place] PLUS TEST STRP) strip Per insurance preference, test 1x/d dx diabetes E11.9    pen needle, diabetic (NOVOTWIST) 32 gauge x 1/5\" ndle Per insurance preference, test 1x/d dx diabetes E11.9    diphenhydrAMINE-acetaminophen (TYLENOL PM EXTRA STRENGTH)  mg tab Take  by mouth every evening.  diclofenac (VOLTAREN) 1 % gel 2 (TWO) GRAM FOUR TIMES DAILY     No current facility-administered medications for this visit.            ALLERGIES     Allergies   Allergen Reactions    Succinylcholine Other (comments)     Prolonged period of recovery following anesthesia    Glipizide Other (comments)     Frequent hypoglycemia    Other Medication Diarrhea     Intolerant of >1000 mg of Metformin    Phentermine Other (comments)     Dizziness and \"crashes\"          SOCIAL HISTORY     Social History     Socioeconomic History    Marital status:      Spouse name: Not on file    Number of children: 3    Years of education: Not on file    Highest education level: Not on file   Occupational History    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[employer]    Smoking status: Former Smoker     Packs/day: 1.00     Years: 10.00     Pack years: 10.00     Types: Cigarettes     Last attempt to quit: 1991     Years since quittin.1    Smokeless tobacco: Never Used   Substance and Sexual Activity    Alcohol use: Yes     Comment: 2 glasses wine maybe twice a week    Drug use: No    Sexual activity: Yes     Partners: Male   Other Topics Concern    Caffeine Concern No     Comment: occ coffee, mostly just on weekends    Weight Concern Yes     Comment: happy her wt is coming down w/ eating healthier and being more active    Special Diet Yes     Comment: cut back on sugars and making healthier food choices    Exercise Yes     Comment: elliptical or taking a walk 2 x a week x 15-20 minutes, doing PT for knee   Social History Narrative    Diabetes: Initial PPV23 4-2018 age 59; will need PCV 13 at age 69 yo and PPSV 21 again at 74 yo        IMMUNIZATIONS  Immunization History   Administered Date(s) Administered    Influenza Vaccine 10/16/2014, 10/22/2015, 11/01/2017, 11/01/2018, 10/24/2019    Influenza Vaccine (Quad) 10/01/2018    Influenza Vaccine (Quad) Mdck Pf 10/16/2018    Influenza Vaccine (Quad) PF 10/24/2019    Influenza Vaccine Split 09/01/2009, 11/01/2012    Pneumococcal Polysaccharide (PPSV-23) 04/19/2018    TD Vaccine 06/05/2012    Zoster Recombinant 06/12/2019, 10/17/2019         FAMILY HISTORY     Family History   Problem Relation Age of Onset    COPD Mother         smoker    Colon Polyps Mother 72    Osteoporosis Mother     Stroke Father     Dementia Father         Alzheimer's    Heart Disease Father         PVD    Cancer Sister         melanoma    Diabetes Maternal Grandmother     Cancer Maternal Grandfather         lung    Hypertension Brother         living in his 66's         VITALS   Virtual visit. No self reported vitals except weight:  Wt 218 lbs     PHYSICAL EXAMINATION   Physical Exam  Constitutional:       General: She is not in acute distress. Pulmonary:      Effort: Pulmonary effort is normal. No respiratory distress. Neurological:      Mental Status: She is oriented to person, place, and time.    Psychiatric:         Mood and Affect: Mood normal.             DIAGNOSTIC DATA         LABORATORY DATA     Results for orders placed or performed in visit on 03/26/20   LIPID PANEL   Result Value Ref Range    Cholesterol, total 211 (H) 100 - 199 mg/dL    Triglyceride 270 (H) 0 - 149 mg/dL    HDL Cholesterol 47 >39 mg/dL    VLDL, calculated 54 (H) 5 - 40 mg/dL    LDL, calculated 110 (H) 0 - 99 mg/dL   HEMOGLOBIN A1C WITH EAG   Result Value Ref Range    Hemoglobin A1c 6.7 (H) 4.8 - 5.6 %    Estimated average glucose 698 mg/dL   METABOLIC PANEL, COMPREHENSIVE   Result Value Ref Range    Glucose 141 (H) 65 - 99 mg/dL    BUN 15 8 - 27 mg/dL    Creatinine 0.87 0.57 - 1.00 mg/dL    GFR est non-AA 72 >59 mL/min/1.73    GFR est AA 83 >59 mL/min/1.73    BUN/Creatinine ratio 17 12 - 28    Sodium 143 134 - 144 mmol/L    Potassium 4.7 3.5 - 5.2 mmol/L    Chloride 102 96 - 106 mmol/L    CO2 23 20 - 29 mmol/L    Calcium 9.9 8.7 - 10.3 mg/dL    Protein, total 7.4 6.0 - 8.5 g/dL    Albumin 4.8 3.8 - 4.8 g/dL    GLOBULIN, TOTAL 2.6 1.5 - 4.5 g/dL    A-G Ratio 1.8 1.2 - 2.2    Bilirubin, total 0.4 0.0 - 1.2 mg/dL    Alk. phosphatase 76 39 - 117 IU/L    AST (SGOT) 23 0 - 40 IU/L    ALT (SGPT) 29 0 - 32 IU/L   CVD REPORT   Result Value Ref Range    INTERPRETATION Note        Lab Results   Component Value Date/Time    Hemoglobin A1c 6.7 (H) 03/26/2020 08:52 AM    Hemoglobin A1c 7.2 (H) 12/09/2019 08:53 AM    Hemoglobin A1c 6.7 (H) 07/12/2019 10:16 AM    Glucose 141 (H) 03/26/2020 08:52 AM    Microalbumin/Creat ratio (mg/g creat) 6 06/18/2009 09:55 AM    Microalb/Creat ratio (ug/mg creat.) 15.8 12/09/2019 08:53 AM    Microalbumin,urine random 0.68 06/18/2009 09:55 AM    LDL, calculated 110 (H) 03/26/2020 08:52 AM    Creatinine 0.87 03/26/2020 08:52 AM          ASSESSMENT & PLAN       ICD-10-CM ICD-9-CM    1. Diabetes mellitus type 2, noninsulin dependent (HCC) E11.9 250.00 LIPID PANEL      METABOLIC PANEL, COMPREHENSIVE      HEMOGLOBIN A1C WITH EAG   2. Mixed hyperlipidemia E78.2 272.2 LIPID PANEL     Patient will have fasting labs done in the next week. Cardiovascular risk and specific lipid/LDL/BS/HgbA1c goals reviewed  Reviewed medications and side effects in detail  Doing well on current regimen, started Crestor 4-2020 and tolerating that well also  Reviewed diet, nutrition, exercise, weight management, BMI/goals, age/risk based screening recommendations, health maintenance & prevention counseling. Cancer screening USPTFS guidelines reviewed w/ pt today. Discussed benefits/positive/negative outcomes of screening based on age/risk stratification. Informed consent for/against screening based on pt's personal hx/risk factors. Updated in history above and health maintenance. Further follow up & other recommendations pending review of labs. If all remains good and stable, follow up in 6 months, sooner prn    ----------------------------------------------------------------------------------------------------------------------------------------------------------  Patient is being evaluated by a video visit encounter for concerns as above. A caregiver was present when appropriate. Due to this being a TeleHealth encounter (During CXPLJ-40 public health emergency), evaluation of the following organ systems was limited: Vitals/Constitutional/EENT/Resp/CV/GI//MS/Neuro/Skin/Heme-Lymph-Imm. Pursuant to the emergency declaration under the [address] [place] [address] waiver authority and the [employer] Act, this Virtual  Visit was conducted, with patient's (and/or legal guardian's) consent, to reduce the patient's risk of exposure to COVID-19 and provide necessary medical care. Services were provided through a video synchronous discussion virtually to substitute for in-person clinic visit. Patient was located at their own home. I was at home while conducting this encounter. Consent:  She and/or her healthcare decision maker is aware that this patient-initiated Telehealth encounter is a billable service, with coverage as determined by her insurance carrier. She is aware that she may receive a bill and has provided verbal consent to proceed: Yes  This virtual visit was conducted via [employer] me.   Pursuant to the emergency declaration under the [address] [place] [address], [address] [place] [address] [facility] authority and the [employer] and [employer] Act, this Virtual  Visit was conducted to reduce the patient's risk of exposure to COVID-19 and provide continuity of care for an established patient. Services were provided through a video synchronous discussion virtually to substitute for in-person clinic visit. Due to this being a TeleHealth evaluation, many elements of the physical examination are unable to be assessed. Total Time: minutes: 21-30 minutes.

## 2020-06-10 NOTE — TELEPHONE ENCOUNTER
Refill. [phone] pharm is verified on file. Requested Prescriptions     Pending Prescriptions Disp Refills    metFORMIN (GLUCOPHAGE) 500 mg tablet 60 Tab 1     Pharm is requesting a 90-day supply. 72y F pmh B Cell Lymphoma, htn, dld, cad, pci on aspirin and plavix presents from [facility] for eval of allergic rx. Pt was start on rituxan and shortly after started to develop difficulty breathing and tremors, improved with solucortef and benadryl, no aggravating factors.

## 2020-06-16 LAB
ALBUMIN SERPL-MCNC: 4.4 G/DL (ref 3.8–4.8)
ALBUMIN/GLOB SERPL: 1.9 {RATIO} (ref 1.2–2.2)
ALP SERPL-CCNC: 69 IU/L (ref 39–117)
ALT SERPL-CCNC: 21 IU/L (ref 0–32)
AST SERPL-CCNC: 15 IU/L (ref 0–40)
BILIRUB SERPL-MCNC: 0.4 MG/DL (ref 0–1.2)
BUN SERPL-MCNC: 19 MG/DL (ref 8–27)
BUN/CREAT SERPL: 21 (ref 12–28)
CALCIUM SERPL-MCNC: 8.9 MG/DL (ref 8.7–10.3)
CHLORIDE SERPL-SCNC: 107 MMOL/L (ref 96–106)
CHOLEST SERPL-MCNC: 135 MG/DL (ref 100–199)
CO2 SERPL-SCNC: 22 MMOL/L (ref 20–29)
CREAT SERPL-MCNC: 0.9 MG/DL (ref 0.57–1)
EST. AVERAGE GLUCOSE BLD GHB EST-MCNC: 137 MG/DL
GLOBULIN SER CALC-MCNC: 2.3 G/DL (ref 1.5–4.5)
GLUCOSE SERPL-MCNC: 117 MG/DL (ref 65–99)
HBA1C MFR BLD: 6.4 % (ref 4.8–5.6)
HDLC SERPL-MCNC: 47 MG/DL
INTERPRETATION, 910389: NORMAL
LDLC SERPL CALC-MCNC: 57 MG/DL (ref 0–99)
POTASSIUM SERPL-SCNC: 4 MMOL/L (ref 3.5–5.2)
PROT SERPL-MCNC: 6.7 G/DL (ref 6–8.5)
SODIUM SERPL-SCNC: 144 MMOL/L (ref 134–144)
TRIGL SERPL-MCNC: 153 MG/DL (ref 0–149)
VLDLC SERPL CALC-MCNC: 31 MG/DL (ref 5–40)

## 2020-09-11 DIAGNOSIS — K21.00 HIATAL HERNIA WITH GERD AND ESOPHAGITIS: ICD-10-CM

## 2020-09-11 DIAGNOSIS — K44.9 HIATAL HERNIA WITH GERD AND ESOPHAGITIS: ICD-10-CM

## 2020-09-11 DIAGNOSIS — E11.9 DIABETES MELLITUS TYPE 2, NONINSULIN DEPENDENT (HCC): ICD-10-CM

## 2020-09-11 RX ORDER — OMEPRAZOLE 20 MG/1
20 CAPSULE, DELAYED RELEASE ORAL
Qty: 90 CAP | Refills: 0 | Status: SHIPPED | OUTPATIENT
Start: 2020-09-11 | End: 2020-12-15 | Stop reason: SDUPTHER

## 2020-09-11 RX ORDER — EMPAGLIFLOZIN, METFORMIN HYDROCHLORIDE 10; 1000 MG/1; MG/1
TABLET, EXTENDED RELEASE ORAL
Qty: 90 EACH | Refills: 0 | Status: SHIPPED | OUTPATIENT
Start: 2020-09-11 | End: 2020-12-15

## 2020-12-10 ENCOUNTER — VIRTUAL VISIT (OUTPATIENT)
Dept: FAMILY MEDICINE CLINIC | Age: 60
End: 2020-12-10
Payer: COMMERCIAL

## 2020-12-10 DIAGNOSIS — E11.9 DIABETES MELLITUS TYPE 2, NONINSULIN DEPENDENT (HCC): Primary | ICD-10-CM

## 2020-12-10 DIAGNOSIS — E66.01 SEVERE OBESITY (BMI 35.0-39.9) WITH COMORBIDITY (HCC): ICD-10-CM

## 2020-12-10 DIAGNOSIS — E78.2 MIXED HYPERLIPIDEMIA: ICD-10-CM

## 2020-12-10 DIAGNOSIS — E55.9 VITAMIN D DEFICIENCY: ICD-10-CM

## 2020-12-10 PROCEDURE — 99214 OFFICE O/P EST MOD 30 MIN: CPT | Performed by: FAMILY MEDICINE

## 2020-12-10 RX ORDER — LANOLIN ALCOHOL/MO/W.PET/CERES
3 CREAM (GRAM) TOPICAL
COMMUNITY
End: 2022-05-05 | Stop reason: ALTCHOICE

## 2020-12-10 RX ORDER — ACETAMINOPHEN 500 MG
2000 TABLET ORAL DAILY
COMMUNITY

## 2020-12-10 NOTE — PROGRESS NOTES
VIRTUAL VISIT    Patient seen via virtual visit today for the following:  Chief Complaint   Patient presents with    Diabetes    Cholesterol Problem     HISTORY OF PRESENT ILLNESS   HPI  6 month follow up Type 2 NIDDM, Hypercholesterolemia and medication check. Still generally trying to adhere to overall healthier eating habits. No regular exercise but wearing her Fit Bit and trying to stay active. Happy her wt continues to come down over time w/ these better lifestyle modifications. Checks BS's fasting almost every morning. Ranges from 105-162.  7 day avg 132  14 day avg 133  30 day avg 129  Feeling well overall     REVIEW OF SYMPTOMS   Review of Systems   Constitutional: Negative. Eyes: Negative. Respiratory: Negative. Cardiovascular: Negative. Gastrointestinal: Negative. Genitourinary: Negative. Musculoskeletal: Negative for myalgias. Neurological: Negative. Endo/Heme/Allergies: Negative. PROBLEM LIST/MEDICAL HISTORY     Problem List  Date Reviewed: 12/10/2020          Codes Class Noted    Mixed hyperlipidemia ICD-10-CM: E78.2  ICD-9-CM: 272.2  3/26/2020        Hiatal hernia with GERD and esophagitis ICD-10-CM: K44.9, K21.00  ICD-9-CM: 553.3, 530.11  3/26/2020    Overview Signed 3/26/2020  8:48 AM by Hannah Tijerina MD     EGD 2016             Tendonitis, Achilles, left ICD-10-CM: M76.62  ICD-9-CM: 726.71  1/9/2019    Overview Signed 1/9/2019  8:33 AM by Hannah Tijerina MD     CSI Dr Yeison Carr              History of SCC (squamous cell carcinoma) of skin ICD-10-CM: V53.385  ICD-9-CM: V10.83  1/9/2019    Overview Signed 1/9/2019  9:24 AM by Hannah Tijerina MD     Skin checks and cancer screenings per [facility].  Dr. Brittany Mora             Heel spur, left ICD-10-CM: M77.32  ICD-9-CM: 726.73  9/19/2018    Overview Signed 9/19/2018  8:49 AM by Hannah Tijerina MD     2/2018, Dr Yeison Carr             Tendonitis, Achilles, right ICD-10-CM: M76.61  ICD-9-CM: 726.71  4/19/2018    Overview Signed 4/19/2018  9:24 AM by Kelsy Cabezas MD     2-2018: Dr Shaneka Moore, bone spur chipped             Severe obesity (BMI 35.0-39. 9) with comorbidity ([facility] ICD-10-CM: E66.01  ICD-9-CM: 278.01  4/19/2018        Bone spurs of feet  ICD-10-CM: M77.50  ICD-9-CM: 726.91  2/24/2017    Overview Signed 2/24/2017 10:56 AM by Kelsy Cabezas MD     Podiatry Dr Lopez Current             Diabetes mellitus type 2, noninsulin dependent ([facility] ICD-10-CM: E11.9  ICD-9-CM: 250.00  12/10/2015    Overview Addendum 12/10/2020 11:56 AM by Kelsy Cabezas MD     3/2001, Eye doctor Dr. Jarod SANON             Vitamin D deficiency ICD-10-CM: E55.9  ICD-9-CM: 268.9  12/11/2013    Overview Signed 12/11/2013  1:27 PM by Kelsy Cabezas MD     12/2013               Postmenopause, LMP age ~ 45 yo, No HRT ICD-10-CM: Z78.0  ICD-9-CM: V49.81  12/5/2012        History of abnormal Pap smear, s/p Cryotherapy in her 27's ICD-10-CM: Z87.898  ICD-9-CM: V13.29  12/5/2012        Stress incontinence ICD-10-CM: N39.3  ICD-9-CM: [place]  12/5/2012    Overview Signed 12/5/2012  9:55 AM by Kelsy Cabezas MD     Since child bearing years             Left sided sciatica ICD-10-CM: M54.32  ICD-9-CM: 724.3  12/5/2012    Overview Signed 12/5/2012 10:25 AM by Kelsy Cabezas MD     12/2012             S/p colonoscopy with polypectomy and diverticulosis 4/2010 ICD-10-CM: Z98.890  ICD-9-CM: V45.89  4/27/2010    Overview Signed 4/27/2010  9:16 AM by MD Dr Sameer Tao             Hiatal Hernia, Gastritis, mild; BX neg for H. Pylori 4/2010 ICD-10-CM: K29.70  ICD-9-CM: 535.50  4/27/2010    Overview Signed 4/27/2010  9:17 AM by MD Dr Sameer Tao             Right knee pain; damage at medial meniscus; 2010 ICD-10-CM: M25.561  ICD-9-CM: 719.46  4/27/2010    Overview Signed 4/27/2010  9:19 AM by Kelsy Cabezas MD Dr Alfonzo Vazquez; P.T. Left foot pain 2009; s/p CSI ICD-10-CM: H37.201  ICD-9-CM: 729.5  4/27/2010    Overview Signed 4/27/2010  9:20 AM by MD Dr Yeimi Juares             Uterine fibroid ICD-10-CM: D25.9  ICD-9-CM: 218.9  Unknown        Meniere's disease ICD-10-CM: H81.09  ICD-9-CM: 386.00  Unknown    Overview Signed 5/24/2016 12:30 PM by Morena Carrera MD     ENT ~ 2003, Dr Tyson Can             FH: melanoma ICD-10-CM: Z80.8  ICD-9-CM: V16.8  Unknown    Overview Signed 1/9/2019  9:25 AM by Morena Carrera MD     Skin cancer screenings, Derm Dr Elza Cook             GERD (gastroesophageal reflux disease) ICD-10-CM: K21.9  ICD-9-CM: 530.81  Unknown                  PAST SURGICAL HISTORY     Past Surgical History:   Procedure Laterality Date    HX BREAST BIOPSY Right 6/2014, Cate Rdz    Benign    HX BREAST LUMPECTOMY   1986    benign fibroadenoma right breast    [address]    Emergency for fetal distress    HX COLONOSCOPY  ~2010    Dr Mark Roche; Normal except Diverticulosis, but f/u 5 yrs d/t Fhx    HX COLONOSCOPY  07/11/2016, Dr Mark Roche    Polyps, Multiple Diverticulae, Internal Hemorrhoids; f/u 5 yrs    HX DILATION AND CURETTAGE  10/8/14    AUB & Uterine Polyp    HX ENDOSCOPY  7-11-16, EGD, Dr Mark Roche    Hiatal Hernia, Grade 1 Esophagitis w/ reflux, Gastritis    HX LAP CHOLECYSTECTOMY  1998    gallstones    HX SKIN BIOPSY  ~2016    SCC excised from chest wall, Dr. Brielle Pisano [place] 9  5/2014    AUB; Negative         MEDICATIONS     Current Outpatient Medications   Medication Sig    cholecalciferol (VITAMIN D3) (2,000 UNITS /50 MCG) cap capsule Take 2,000 Units by mouth daily.  TURMERIC PO Take  by mouth daily.  melatonin 3 mg tablet Take 3 mg by mouth nightly.     Synjardy XR 10-1,000 mg TBph TAKE 1 TABLET BY MOUTH EVERY DAY IN THE MORNING WITH BREAKFAST    omeprazole (PRILOSEC) 20 mg capsule TAKE 1 CAP BY MOUTH DAILY (BEFORE BREAKFAST).  rosuvastatin (CRESTOR) 10 mg tablet TAKE 1 TABLET BY MOUTH EVERY DAY AT NIGHT    OTHER,NON-FORMULARY, CBD Oil for sleep    liraglutide (Victoza 3-Manolo) 0.6 mg/0.1 mL (18 mg/3 mL) pnij INJECT 1.8 MG (0.3 ML) SUBCUTANEOUSLY DAILY    diclofenac EC (VOLTAREN) 75 mg EC tablet Take 75 mg by mouth daily as needed. Per ortho for knee    krill-om-3-dha-epa-phospho-ast (MEGARED OMEGA-3 KRILL OIL) 1,000-230-60 mg cap Take 1 Cap by mouth daily.  glucose blood VI test strips (ACCU-CHEK [place] PLUS TEST STRP) strip Per insurance preference, test 1x/d dx diabetes E11.9    pen needle, diabetic (NOVOTWIST) 32 gauge x 1/5\" ndle Per insurance preference, test 1x/d dx diabetes E11.9    diclofenac (VOLTAREN) 1 % gel 2 (TWO) GRAM FOUR TIMES DAILY     No current facility-administered medications for this visit.            ALLERGIES     Allergies   Allergen Reactions    Succinylcholine Other (comments)     Prolonged period of recovery following anesthesia    Glipizide Other (comments)     Frequent hypoglycemia    Other Medication Diarrhea     Intolerant of >1000 mg of Metformin    Phentermine Other (comments)     Dizziness and \"crashes\"          SOCIAL HISTORY     Social History     Socioeconomic History    Marital status:      Spouse name: Not on file    Number of children: 3    Years of education: Not on file    Highest education level: Not on file   Occupational History    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[employer]    Smoking status: Former Smoker     Packs/day: 1.00     Years: 10.00     Pack years: 10.00     Types: Cigarettes     Last attempt to quit: 1991     Years since quittin.6    Smokeless tobacco: Never Used   Substance and Sexual Activity    Alcohol use: Yes     Comment: 2 glasses wine maybe twice a week    Drug use: No    Sexual activity: Yes     Partners: Male   Other Topics Concern    Caffeine Concern No     Comment: occ coffee, mostly just on weekends    Weight Concern Yes     Comment: happy her wt is coming down w/ eating healthier and being more active    Special Diet Yes     Comment: cut back on sugars and making healthier food choices    Exercise No     Comment: nothing regular right now   Social History Narrative    Diabetes: Initial PPV23 4-2018 age 59; will need PCV 13 at age 69 yo and PPSV 21 again at 74 yo        IMMUNIZATIONS  Immunization History   Administered Date(s) Administered    Influenza Vaccine 10/16/2014, 10/22/2015, 11/01/2017, 11/01/2018, 10/24/2019    Influenza Vaccine (>6 mo Afluria QUAD Vial 34090 (0.25 mL) / 72718 (0.5 mL)) 10/01/2018    Influenza Vaccine (Quad) Mdck Pf (>4 Yrs Flucelvax QUAD [id number]) 10/16/2018    Influenza Vaccine (Quad) PF (>6 Mo Flulaval, Fluarix, and >3 Yrs Afluria, Fluzone [id number]) 10/24/2019, 09/04/2020    Influenza Vaccine Split 09/01/2009, 11/01/2012    Pneumococcal Polysaccharide (PPSV-23) 04/19/2018    TD Vaccine 06/05/2012    Zoster Recombinant 06/12/2019, 10/17/2019         FAMILY HISTORY     Family History   Problem Relation Age of Onset    COPD Mother         smoker    Colon Polyps Mother 72    Osteoporosis Mother     Stroke Father     Dementia Father         Alzheimer's    Heart Disease Father         PVD    Cancer Sister         melanoma    Diabetes Maternal Grandmother     Cancer Maternal Grandfather         lung    Hypertension Brother         living in his 66's         VITALS     Vitals limited due to virtual visit. Self reported data collected today:  Patient-Reported Vitals 12/10/2020   Patient-Reported Weight 222 lbs   Patient-Reported Pulse 74   Patient-Reported SpO2 98%   Patient-Reported Systolic  579   Patient-Reported Diastolic 76         PHYSICAL EXAMINATION   Physical Exam  Vitals signs reviewed. Constitutional:       General: She is not in acute distress.   Pulmonary:      Effort: Pulmonary effort is normal. Neurological:      Mental Status: She is alert and oriented to person, place, and time. Psychiatric:         Mood and Affect: Mood normal.             DIAGNOSTIC DATA         LABORATORY DATA     Results for orders placed or performed in visit on 06/08/20   LIPID PANEL   Result Value Ref Range    Cholesterol, total 135 100 - 199 mg/dL    Triglyceride 153 (H) 0 - 149 mg/dL    HDL Cholesterol 47 >39 mg/dL    VLDL, calculated 31 5 - 40 mg/dL    LDL, calculated 57 0 - 99 mg/dL   METABOLIC PANEL, COMPREHENSIVE   Result Value Ref Range    Glucose 117 (H) 65 - 99 mg/dL    BUN 19 8 - 27 mg/dL    Creatinine 0.90 0.57 - 1.00 mg/dL    GFR est non-AA 69 >59 mL/min/1.73    GFR est AA 80 >59 mL/min/1.73    BUN/Creatinine ratio 21 12 - 28    Sodium 144 134 - 144 mmol/L    Potassium 4.0 3.5 - 5.2 mmol/L    Chloride 107 (H) 96 - 106 mmol/L    CO2 22 20 - 29 mmol/L    Calcium 8.9 8.7 - 10.3 mg/dL    Protein, total 6.7 6.0 - 8.5 g/dL    Albumin 4.4 3.8 - 4.8 g/dL    GLOBULIN, TOTAL 2.3 1.5 - 4.5 g/dL    A-G Ratio 1.9 1.2 - 2.2    Bilirubin, total 0.4 0.0 - 1.2 mg/dL    Alk. phosphatase 69 39 - 117 IU/L    AST (SGOT) 15 0 - 40 IU/L    ALT (SGPT) 21 0 - 32 IU/L   HEMOGLOBIN A1C WITH EAG   Result Value Ref Range    Hemoglobin A1c 6.4 (H) 4.8 - 5.6 %    Estimated average glucose 137 mg/dL   CVD REPORT   Result Value Ref Range    INTERPRETATION Note        Lab Results   Component Value Date/Time    Hemoglobin A1c 6.4 (H) 06/15/2020 09:02 AM    Hemoglobin A1c 6.7 (H) 03/26/2020 08:52 AM    Hemoglobin A1c 7.2 (H) 12/09/2019 08:53 AM    Glucose 117 (H) 06/15/2020 09:02 AM    Microalbumin/Creat ratio (mg/g creat) 6 06/18/2009 09:55 AM    Microalb/Creat ratio (ug/mg creat.) 15.8 12/09/2019 08:53 AM    Microalbumin,urine random 0.68 06/18/2009 09:55 AM    LDL, calculated 57 06/15/2020 09:02 AM    Creatinine 0.90 06/15/2020 09:02 AM          ASSESSMENT & PLAN       ICD-10-CM ICD-9-CM    1.  Diabetes mellitus type 2, noninsulin dependent ([facility] E11.9 250.00 CBC W/O DIFF      METABOLIC PANEL, COMPREHENSIVE      LIPID PANEL      HEMOGLOBIN A1C WITH EAG      MICROALBUMIN, UR, RAND W/ MICROALB/CREAT RATIO      HM DIABETES EYE EXAM   2. Mixed hyperlipidemia  O69.1 684.3 METABOLIC PANEL, COMPREHENSIVE      LIPID PANEL      TSH 3RD GENERATION   3. Vitamin D deficiency  E55.9 268.9 VITAMIN D, 25 HYDROXY     Diagnoses and all orders for this visit:    1. Diabetes mellitus type 2, noninsulin dependent ([facility]  Assessment & Plan:  Stable, based on history, physical exam and review of pertinent labs, studies and medications; meds reconciled; continue current treatment plan, lifestyle modifications recommended. Key Antihyperglycemic Medications             Synjardy XR 10-1,000 mg TBph (Taking) TAKE 1 TABLET BY MOUTH EVERY DAY IN THE MORNING WITH BREAKFAST    liraglutide (Victoza 3-Manolo) 0.6 mg/0.1 mL (18 mg/3 mL) pnij (Taking) INJECT 1.8 MG (0.3 ML) SUBCUTANEOUSLY DAILY        Other Key Diabetic Medications             rosuvastatin (CRESTOR) 10 mg tablet (Taking) TAKE 1 TABLET BY MOUTH EVERY DAY AT NIGHT    krill-om-3-dha-epa-phospho-ast (MEGARED OMEGA-3 KRILL OIL) 1,000-230-60 mg cap (Taking) Take 1 Cap by mouth daily. Lab Results   Component Value Date/Time    Hemoglobin A1c 6.4 06/15/2020 09:02 AM    Glucose 117 06/15/2020 09:02 AM    Creatinine 0.90 06/15/2020 09:02 AM    Microalb/Creat ratio (ug/mg creat.) 15.8 12/09/2019 08:53 AM    Cholesterol, total 135 06/15/2020 09:02 AM    HDL Cholesterol 47 06/15/2020 09:02 AM    LDL, calculated 57 06/15/2020 09:02 AM    Triglyceride 153 06/15/2020 09:02 AM     Diabetic Foot and Eye Exam HM Status   Topic Date Due    Eye Exam  05/18/2020    Diabetic Foot Care  12/09/2020       Orders:  -     CBC W/O DIFF; Future  -     METABOLIC PANEL, COMPREHENSIVE; Future  -     LIPID PANEL; Future  -     HEMOGLOBIN A1C WITH EAG; Future  -     MICROALBUMIN, UR, RAND W/ MICROALB/CREAT RATIO; Future  -     HM DIABETES EYE EXAM    2. Mixed hyperlipidemia  -     METABOLIC PANEL, COMPREHENSIVE; Future  -     LIPID PANEL; Future  -     TSH 3RD GENERATION; Future    3. Vitamin D deficiency  -     VITAMIN D, 25 HYDROXY; Future    4. Severe obesity (BMI 35.0-39. 9) with comorbidity [facility])  Assessment & Plan:  Uncontrolled, based on history, physical exam and review of pertinent labs, studies and medications; meds reconciled; continue current treatment plan, lifestyle modifications recommended. Key Obesity Meds             liraglutide (Victoza 3-Manolo) 0.6 mg/0.1 mL (18 mg/3 mL) pnij (Taking) INJECT 1.8 MG (0.3 ML) SUBCUTANEOUSLY DAILY          Patient will come for fasting labs in the next 1-2 weeks  Cardiovascular risk and specific BS/HgbA1c/lipid/LDL goals reviewed  Reviewed medications and side effects   Specific diabetic recommendations: foot care discussed and Podiatry visits discussed, annual eye examinations at Ophthalmology discussed and long term diabetic complications discussed   Patient reports having diabetic eye exam w/ Dr. Stu Stiles at [facility] 11-9-20, report forthcoming  Reviewed diet, nutrition, exercise, weight management, BMI/goals. Age/risk based screening recommendations, health maintenance & prevention counseling. Cancer screening USPTFS guidelines reviewed w/ pt today. Discussed benefits/positive/negative outcomes of screening based on age/risk stratification. Informed consent for/against screening based on pt's personal hx/risk factors. Updated in history above and health maintenance. Further follow up & other recommendations pending review of labs. If all remains good and stable, follow up in 6 months, sooner prn    ---------------------------------------------------------------------------------------------------------------  Patient is being evaluated by a video visit encounter for concerns as above. A caregiver was present when appropriate.  Due to this being a TeleHealth encounter (During [facility]BY[facility]-33 public health emergency), evaluation of the following organ systems was limited: Vitals/Constitutional/EENT/Resp/CV/GI//MS/Neuro/Skin/Heme-Lymph-Imm. Pursuant to the emergency declaration under the [address] [place] [address] authority and the [employer] and [employer] Act, this Virtual  Visit was conducted, with patient's (and/or legal guardian's) consent, to reduce the patient's risk of exposure to COVID-19 and provide necessary medical care. Services were provided through a video synchronous discussion virtually to substitute for in-person clinic visit. Patient was located at their own home. I was in the office while conducting this encounter. Consent:  She and/or her healthcare decision maker is aware that this patient-initiated Telehealth encounter is a billable service, with coverage as determined by her insurance carrier. She is aware that she may receive a bill and has provided verbal consent to proceed: Yes  This virtual visit was conducted via [employer]. Pursuant to the emergency declaration under the [address] [place] [address] authority and the [employer] and [employer] Act, this Virtual  Visit was conducted to reduce the patient's risk of exposure to COVID-19 and provide continuity of care for an established patient. Services were provided through a video synchronous discussion virtually to substitute for in-person clinic visit. Due to this being a TeleHealth evaluation, many elements of the physical examination are unable to be assessed. Total Time: minutes: 21-30 minutes.

## 2020-12-11 ENCOUNTER — TELEPHONE (OUTPATIENT)
Dept: FAMILY MEDICINE CLINIC | Age: 60
End: 2020-12-11

## 2020-12-11 NOTE — ASSESSMENT & PLAN NOTE
Uncontrolled, based on history, physical exam and review of pertinent labs, studies and medications; meds reconciled; continue current treatment plan, lifestyle modifications recommended.   Key Obesity Meds             liraglutide (Victoza 3-Manolo) 0.6 mg/0.1 mL (18 mg/3 mL) pnij (Taking) INJECT 1.8 MG (0.3 ML) SUBCUTANEOUSLY DAILY        Lab Results   Component Value Date/Time    Hemoglobin A1c 6.4 06/15/2020 09:02 AM    Glucose 117 06/15/2020 09:02 AM    Cholesterol, total 135 06/15/2020 09:02 AM    HDL Cholesterol 47 06/15/2020 09:02 AM    LDL, calculated 57 06/15/2020 09:02 AM    Triglyceride 153 06/15/2020 09:02 AM    TSH 0.781 12/09/2019 08:53 AM    Sodium 144 06/15/2020 09:02 AM    Potassium 4.0 06/15/2020 09:02 AM    ALT (SGPT) 21 06/15/2020 09:02 AM    VITAMIN D, 25-HYDROXY 18.7 07/12/2019 10:16 AM

## 2020-12-11 NOTE — ASSESSMENT & PLAN NOTE
Stable, based on history, physical exam and review of pertinent labs, studies and medications; meds reconciled; continue current treatment plan, lifestyle modifications recommended. Key Antihyperglycemic Medications             Synjardy XR 10-1,000 mg TBph (Taking) TAKE 1 TABLET BY MOUTH EVERY DAY IN THE MORNING WITH BREAKFAST    liraglutide (Victoza 3-Manolo) 0.6 mg/0.1 mL (18 mg/3 mL) pnij (Taking) INJECT 1.8 MG (0.3 ML) SUBCUTANEOUSLY DAILY        Other Key Diabetic Medications             rosuvastatin (CRESTOR) 10 mg tablet (Taking) TAKE 1 TABLET BY MOUTH EVERY DAY AT NIGHT    krill-om-3-dha-epa-phospho-ast (MEGARED OMEGA-3 KRILL OIL) 1,000-230-60 mg cap (Taking) Take 1 Cap by mouth daily.         Lab Results   Component Value Date/Time    Hemoglobin A1c 6.4 06/15/2020 09:02 AM    Glucose 117 06/15/2020 09:02 AM    Creatinine 0.90 06/15/2020 09:02 AM    Microalb/Creat ratio (ug/mg creat.) 15.8 12/09/2019 08:53 AM    Cholesterol, total 135 06/15/2020 09:02 AM    HDL Cholesterol 47 06/15/2020 09:02 AM    LDL, calculated 57 06/15/2020 09:02 AM    Triglyceride 153 06/15/2020 09:02 AM     Diabetic Foot and Eye Exam HM Status   Topic Date Due    Eye Exam  05/18/2020    Diabetic Foot Care  12/09/2020

## 2020-12-15 DIAGNOSIS — E11.9 DIABETES MELLITUS TYPE 2, NONINSULIN DEPENDENT (HCC): ICD-10-CM

## 2020-12-15 DIAGNOSIS — K21.00 HIATAL HERNIA WITH GERD AND ESOPHAGITIS: ICD-10-CM

## 2020-12-15 DIAGNOSIS — K44.9 HIATAL HERNIA WITH GERD AND ESOPHAGITIS: ICD-10-CM

## 2020-12-15 RX ORDER — OMEPRAZOLE 20 MG/1
20 CAPSULE, DELAYED RELEASE ORAL
Qty: 90 CAP | Refills: 3 | Status: SHIPPED | OUTPATIENT
Start: 2020-12-15 | End: 2021-12-06 | Stop reason: ALTCHOICE

## 2020-12-15 RX ORDER — EMPAGLIFLOZIN, METFORMIN HYDROCHLORIDE 10; 1000 MG/1; MG/1
TABLET, EXTENDED RELEASE ORAL
Qty: 90 EACH | Refills: 0 | Status: SHIPPED | OUTPATIENT
Start: 2020-12-15 | End: 2021-02-28

## 2020-12-15 NOTE — TELEPHONE ENCOUNTER
Last Visit: VV 12/10/20  MD Char Pires  Next Appointment: labs 12/22/20   Previous Refill Encounter(s): 9/11/20  90    Requested Prescriptions     Pending Prescriptions Disp Refills    omeprazole (PRILOSEC) 20 mg capsule 90 Cap 1     Sig: Take 1 Cap by mouth Daily (before breakfast).

## 2020-12-22 ENCOUNTER — APPOINTMENT (OUTPATIENT)
Dept: FAMILY MEDICINE CLINIC | Age: 60
End: 2020-12-22

## 2020-12-22 DIAGNOSIS — E55.9 VITAMIN D DEFICIENCY: ICD-10-CM

## 2020-12-22 DIAGNOSIS — E78.2 MIXED HYPERLIPIDEMIA: ICD-10-CM

## 2020-12-22 DIAGNOSIS — E11.9 DIABETES MELLITUS TYPE 2, NONINSULIN DEPENDENT (HCC): ICD-10-CM

## 2020-12-22 LAB
25(OH)D3 SERPL-MCNC: 30.8 NG/ML (ref 30–100)
ALBUMIN SERPL-MCNC: 4.1 G/DL (ref 3.5–5)
ALBUMIN/GLOB SERPL: 1.4 {RATIO} (ref 1.1–2.2)
ALP SERPL-CCNC: 73 U/L (ref 45–117)
ALT SERPL-CCNC: 29 U/L (ref 12–78)
ANION GAP SERPL CALC-SCNC: 4 MMOL/L (ref 5–15)
AST SERPL-CCNC: 16 U/L (ref 15–37)
BILIRUB SERPL-MCNC: 0.5 MG/DL (ref 0.2–1)
BUN SERPL-MCNC: 14 MG/DL (ref 6–20)
BUN/CREAT SERPL: 16 (ref 12–20)
CALCIUM SERPL-MCNC: 8.9 MG/DL (ref 8.5–10.1)
CHLORIDE SERPL-SCNC: 107 MMOL/L (ref 97–108)
CHOLEST SERPL-MCNC: 143 MG/DL
CO2 SERPL-SCNC: 29 MMOL/L (ref 21–32)
CREAT SERPL-MCNC: 0.85 MG/DL (ref 0.55–1.02)
CREAT UR-MCNC: 88.3 MG/DL
ERYTHROCYTE [DISTWIDTH] IN BLOOD BY AUTOMATED COUNT: 12.7 % (ref 11.5–14.5)
EST. AVERAGE GLUCOSE BLD GHB EST-MCNC: 134 MG/DL
GLOBULIN SER CALC-MCNC: 3 G/DL (ref 2–4)
GLUCOSE SERPL-MCNC: 121 MG/DL (ref 65–100)
HBA1C MFR BLD: 6.3 % (ref 4–5.6)
HCT VFR BLD AUTO: 46.1 % (ref 35–47)
HDLC SERPL-MCNC: 54 MG/DL
HDLC SERPL: 2.6 {RATIO} (ref 0–5)
HGB BLD-MCNC: 14.6 G/DL (ref 11.5–16)
LDLC SERPL CALC-MCNC: 58.4 MG/DL (ref 0–100)
LIPID PROFILE,FLP: ABNORMAL
MCH RBC QN AUTO: 29.4 PG (ref 26–34)
MCHC RBC AUTO-ENTMCNC: 31.7 G/DL (ref 30–36.5)
MCV RBC AUTO: 92.9 FL (ref 80–99)
MICROALBUMIN UR-MCNC: 1.45 MG/DL
MICROALBUMIN/CREAT UR-RTO: 16 MG/G (ref 0–30)
NRBC # BLD: 0 K/UL (ref 0–0.01)
NRBC BLD-RTO: 0 PER 100 WBC
PLATELET # BLD AUTO: 203 K/UL (ref 150–400)
PMV BLD AUTO: 10.1 FL (ref 8.9–12.9)
POTASSIUM SERPL-SCNC: 4.1 MMOL/L (ref 3.5–5.1)
PROT SERPL-MCNC: 7.1 G/DL (ref 6.4–8.2)
RBC # BLD AUTO: 4.96 M/UL (ref 3.8–5.2)
SODIUM SERPL-SCNC: 140 MMOL/L (ref 136–145)
TRIGL SERPL-MCNC: 153 MG/DL (ref ?–150)
TSH SERPL DL<=0.05 MIU/L-ACNC: 0.26 UIU/ML (ref 0.36–3.74)
VLDLC SERPL CALC-MCNC: 30.6 MG/DL
WBC # BLD AUTO: 8.2 K/UL (ref 3.6–11)

## 2021-01-03 ENCOUNTER — PATIENT MESSAGE (OUTPATIENT)
Dept: FAMILY MEDICINE CLINIC | Age: 61
End: 2021-01-03

## 2021-01-03 DIAGNOSIS — R79.89 LOW TSH LEVEL: Primary | ICD-10-CM

## 2021-01-14 ENCOUNTER — PATIENT MESSAGE (OUTPATIENT)
Dept: FAMILY MEDICINE CLINIC | Age: 61
End: 2021-01-14

## 2021-01-14 ENCOUNTER — TELEPHONE (OUTPATIENT)
Dept: FAMILY MEDICINE CLINIC | Age: 61
End: 2021-01-14

## 2021-01-14 NOTE — TELEPHONE ENCOUNTER
From: Crys Ortega  To: Bud Garcia MD  Sent: 1/14/2021 1:30 PM EST  Subject: Prescription Question    I am writing to follow up on my previous email sent on January 8 and phone call on Alley Thierno about my prescription for Cele twist needles that I use with Victoza. I have discussed with my insurance company and what I need from you is a call to my pharmacy  at [phone] saying that I tried the BD needles in the past and they didnt work for me so you are authorizing continued use of Cele twist needles currently being used.     Im almost out so would appreciate if you can call ASAP   Thanks

## 2021-01-14 NOTE — TELEPHONE ENCOUNTER
PA approval for Cele Twist needles affective 1/14/21-1/14/22. [facility] pharmacy notified.  Rusty Marie LPN

## 2021-01-18 DIAGNOSIS — E78.2 MIXED HYPERLIPIDEMIA: ICD-10-CM

## 2021-01-18 RX ORDER — ROSUVASTATIN CALCIUM 10 MG/1
10 TABLET, COATED ORAL
Qty: 90 TAB | Refills: 1 | Status: SHIPPED | OUTPATIENT
Start: 2021-01-18 | End: 2021-07-14

## 2021-01-18 NOTE — TELEPHONE ENCOUNTER
Last Visit: VV 12/10/20 MD Rosa Che, labs done  Next Appointment: additional labs 2/3/21   Previous Refill Encounter(s): 6/20/20 90 + 1    Requested Prescriptions     Pending Prescriptions Disp Refills    rosuvastatin (CRESTOR) 10 mg tablet 90 Tab 1     Sig: Take 1 Tab by mouth nightly.

## 2021-01-23 RX ORDER — BLOOD SUGAR DIAGNOSTIC
STRIP MISCELLANEOUS
Qty: 100 STRIP | Refills: 3 | Status: SHIPPED | OUTPATIENT
Start: 2021-01-23

## 2021-02-03 ENCOUNTER — APPOINTMENT (OUTPATIENT)
Dept: FAMILY MEDICINE CLINIC | Age: 61
End: 2021-02-03

## 2021-02-03 DIAGNOSIS — R79.89 LOW TSH LEVEL: ICD-10-CM

## 2021-02-04 LAB
T3 SERPL-MCNC: 113 NG/DL (ref 71–180)
T4 FREE SERPL-MCNC: 1 NG/DL (ref 0.8–1.5)
TSH RECEP AB SER-ACNC: <1.1 IU/L (ref 0–1.75)
TSH SERPL DL<=0.05 MIU/L-ACNC: 0.15 UIU/ML (ref 0.36–3.74)

## 2021-02-08 ENCOUNTER — VIRTUAL VISIT (OUTPATIENT)
Dept: FAMILY MEDICINE CLINIC | Age: 61
End: 2021-02-08
Payer: COMMERCIAL

## 2021-02-08 DIAGNOSIS — L03.213 PRESEPTAL CELLULITIS OF LEFT EYE: Primary | ICD-10-CM

## 2021-02-08 DIAGNOSIS — H02.846 SWELLING OF EYELID, LEFT: ICD-10-CM

## 2021-02-08 PROCEDURE — 99213 OFFICE O/P EST LOW 20 MIN: CPT | Performed by: FAMILY MEDICINE

## 2021-02-08 RX ORDER — SULFAMETHOXAZOLE AND TRIMETHOPRIM 800; 160 MG/1; MG/1
1 TABLET ORAL 2 TIMES DAILY
Qty: 10 TAB | Refills: 0 | Status: SHIPPED | OUTPATIENT
Start: 2021-02-08 | End: 2021-02-13

## 2021-02-08 RX ORDER — AMOXICILLIN AND CLAVULANATE POTASSIUM 875; 125 MG/1; MG/1
1 TABLET, FILM COATED ORAL 2 TIMES DAILY
Qty: 10 TAB | Refills: 0 | Status: SHIPPED | OUTPATIENT
Start: 2021-02-08 | End: 2021-02-13

## 2021-02-08 NOTE — PATIENT INSTRUCTIONS
Cellulitis of the Eye: Care Instructions Your Care Instructions Cellulitis of the eye is an infection of the skin and tissues around the eye. It is also called preseptal cellulitis or periorbital cellulitis. It is usually caused by bacteria. This type of infection may happen after a sinus infection or a dental infection. It could also happen after an insect bite or an injury to the face. It most often occurs where there is a break in the skin. Cellulitis of the eye can be very serious. It's important to treat it right away. If you do, it usually goes away without lasting problems. Medicine and home treatment can help you get better. Follow-up care is a key part of your treatment and safety. Be sure to make and go to all appointments, and call your doctor if you are having problems. It's also a good idea to know your test results and keep a list of the medicines you take. How can you care for yourself at home? · Take your antibiotics as directed. Do not stop taking them just because you feel better. You need to take the full course of antibiotics. · Do not wear contact lenses unless your doctor tells you it is okay. · Put your head on pillows, and put a cool, damp cloth on your eye. This can reduce swelling and pain. · If your doctor recommends it, use a warm pack on your eye. · Keep the skin around your eye clean and dry. · Be safe with medicines. Read and follow all instructions on the label. ? If the doctor gave you a prescription medicine for pain, take it as prescribed. ? If you are not taking a prescription pain medicine, ask your doctor if you can take an over-the-counter medicine. To prevent cellulitis · Wash your hands well after you use the bathroom and before and after you eat. · Do not rub or pick at the skin around your eyes. Cellulitis occurs most often where there is a break in the skin. · If you get a cut, pimple, or insect bite near your eye, clean the area as soon as you can. This can help prevent an infection. ? Wash the area with cool water and a mild soap, such as [place]. ? Do not use rubbing alcohol, hydrogen peroxide, iodine, or Mercurochrome. These can harm the tissues and slow healing. · Call your doctor if you have a sinus infection with redness or swelling of your eyes. When should you call for help? Call your doctor now or seek immediate medical care if: 
  · You have new or worse signs of an eye infection, such as: 
? Pus or thick discharge coming from the eye. 
? Redness or swelling around the eye. 
? A fever.  
  · Your eye seems to be bulging out.  
  · You seem to be getting sicker.  
  · You have vision changes. Watch closely for changes in your health, and be sure to contact your doctor if: 
  · You do not get better as expected. Where can you learn more? Go to http://www.gray.com/ Enter [phone] in the search box to learn more about \"Cellulitis of the Eye: Care Instructions. \" Current as of: December 18, 2019               Content Version: 12.6 © 0091-0852 [employer]. Care instructions adapted under license by [place] (which disclaims liability or warranty for this information). If you have questions about a medical condition or this instruction, always ask your healthcare professional. [place] [address] any warranty or liability for your use of this information.

## 2021-02-08 NOTE — PROGRESS NOTES
[address] [address] Note      Assessment/ Plan:   Diagnoses and all orders for this visit:    1. Preseptal cellulitis of left eye  -     amoxicillin-clavulanate (AUGMENTIN) 875-125 mg per tablet; Take 1 Tab by mouth two (2) times a day for 5 days. -     trimethoprim-sulfamethoxazole (BACTRIM DS, SEPTRA DS) 160-800 mg per tablet; Take 1 Tab by mouth two (2) times a day for 5 days. 2. Swelling of eyelid, left      Recommendations based on history, physical exam and review of pertinent labs, studies and medications:   Left upper eyelid swelling likely stye vs developing preseptal cellulitis. . Continue warm compress and trail antibiotic for developing cellulitis. Follow-up in office or see ophthalmologist if symptoms do not improve with treatment. We discussed the expected course, resolution and complications of the diagnosis(es) in detail. Medication risks, benefits, costs, interactions, and alternatives were discussed as indicated. I advised her to contact the office if her condition worsens, changes or fails to improve as anticipated. She expressed understanding with the diagnosis(es) and plan. Shubham Jimenez is a 56 y.o. female being evaluated by a video visit encounter for concerns as above. A caregiver was present when appropriate. Due to this being a TeleHealth encounter (During [facility]-41 public health emergency), evaluation of the following organ systems was limited: Vitals/Constitutional/EENT/Resp/CV/GI//MS/Neuro/Skin/Heme-Lymph-Imm. Pursuant to the emergency declaration under the [facility], 1135 waiver authority and the [employer] Act, this Virtual  Visit was conducted, with patient's (and/or legal guardian's) consent, to reduce the patient's risk of exposure to COVID-19 and provide necessary medical care.   Services were provided through a video synchronous discussion virtually to substitute for in-person clinic visit. Provider was in the office while conducting this encounter. Patient was at home during encounter. Other persons participating in call: None  Consent:  She and/or her healthcare decision maker is aware that this patient-initiated Telehealth encounter is a billable service, with coverage as determined by her insurance carrier. She is aware that she may receive a bill and has provided verbal consent to proceed: Yes  This virtual visit was conducted via P[employer]. Pursuant to the emergency declaration under the [facility], [facility] waiver authority and the [employer] Act, this Virtual  Visit was conducted to reduce the patient's risk of exposure to COVID-19 and provide continuity of care for an established patient. Services were provided through a video synchronous discussion virtually to substitute for in-person clinic visit. Due to this being a TeleHealth evaluation, many elements of the physical examination are unable to be assessed. Total Time: minutes: 11-20 minutes. Follow-up and Dispositions    · Return if symptoms worsen or fail to improve. Subjective:     Chief Complaint   Patient presents with    Eye Problem     Jose Khanna is a 56y.o. year old female who presents for evaluation of the following:      Left Eyelid Swelling  Onset: Saturday, 2 days ago  Character: swelling, discharge in the morning only. Dry crusting  Eyelid tender  Endorse allergies, using flonase  Denies injury, eye pain, red eye. watering, fever      Review of Systems   Pertinent positives and negative per HPI. All other systems  reviewed are negative for a Comprehensive ROS (10+). Past medical history, social history, family history reviewed. Medications reconciled.        Objective:     Patient-Reported Vitals 2/8/2021   Patient-Reported Weight 220   Patient-Reported Height 5’7”   Patient-Reported Pulse 77   Patient-Reported Temperature 98.6.    FYI. My normal temp is most often 97.5-98   Patient-Reported SpO2 98   Patient-Reported Systolic  131   Patient-Reported Diastolic 78      Vitals measurement not available     Physical Examination:  General: Alert, cooperative, no distress, appears stated age.  Eyes: Conjunctivae clear. Pupils equally round. Extraocular muscles intact.  -Left upper eyelid with localized swelling and minimal erythema.  Ears: Normal appearing external ear   Nose: Nares normal appearing  Mouth/Throat: Lips, mucosa, and tongue normal. Moist mucous membranes. No tonsillar enlargement noted.   Neck: Supple, symmetrical, trachea midline, no neck mass visualized.   Respiratory: Breathing comfortably, in no acute respiratory distress.   Cardiovascular: Visualized extremities without edema.   MSK: Upper extremities normal appearing.  Skin: No significant erythematous lesions or discoloration noted on facial skin. No rashes or lesions on exposed skin.  Neurologic: No facial asymmetry. Normal gaze. Cranial nerves intact.   Psychiatric: Affect appropriate. Mood euthymic. Thoughts logical. Speech volume and speed normal. No hallucinations. Well kempt.       Signed,    Geno Cote MD  2/8/2021

## 2021-02-28 DIAGNOSIS — E11.9 DIABETES MELLITUS TYPE 2, NONINSULIN DEPENDENT (HCC): ICD-10-CM

## 2021-02-28 RX ORDER — EMPAGLIFLOZIN, METFORMIN HYDROCHLORIDE 10; 1000 MG/1; MG/1
TABLET, EXTENDED RELEASE ORAL
Qty: 90 EACH | Refills: 1 | Status: SHIPPED | OUTPATIENT
Start: 2021-02-28 | End: 2021-09-26

## 2021-04-17 DIAGNOSIS — E11.9 DIABETES MELLITUS TYPE 2, NONINSULIN DEPENDENT (HCC): ICD-10-CM

## 2021-04-18 RX ORDER — PEN NEEDLE, DIABETIC 30 GX 1/3"
NEEDLE, DISPOSABLE MISCELLANEOUS
Qty: 100 PEN NEEDLE | Refills: 3 | Status: SHIPPED | OUTPATIENT
Start: 2021-04-18 | End: 2022-06-20

## 2021-04-18 RX ORDER — LIRAGLUTIDE 6 MG/ML
INJECTION SUBCUTANEOUS
Qty: 3 ML | Refills: 2 | Status: SHIPPED | OUTPATIENT
Start: 2021-04-18 | End: 2021-09-22

## 2021-06-14 ENCOUNTER — OFFICE VISIT (OUTPATIENT)
Dept: FAMILY MEDICINE CLINIC | Age: 61
End: 2021-06-14
Payer: COMMERCIAL

## 2021-06-14 VITALS
BODY MASS INDEX: 34.56 KG/M2 | HEIGHT: 67 IN | TEMPERATURE: 97.8 F | DIASTOLIC BLOOD PRESSURE: 82 MMHG | SYSTOLIC BLOOD PRESSURE: 138 MMHG | WEIGHT: 220.2 LBS | OXYGEN SATURATION: 97 % | RESPIRATION RATE: 18 BRPM | HEART RATE: 75 BPM

## 2021-06-14 DIAGNOSIS — E78.2 MIXED HYPERLIPIDEMIA: ICD-10-CM

## 2021-06-14 DIAGNOSIS — E11.9 DIABETES MELLITUS TYPE 2, NONINSULIN DEPENDENT (HCC): Primary | ICD-10-CM

## 2021-06-14 DIAGNOSIS — Z12.31 ENCOUNTER FOR SCREENING MAMMOGRAM FOR BREAST CANCER: ICD-10-CM

## 2021-06-14 PROBLEM — E66.01 SEVERE OBESITY (BMI 35.0-39.9) WITH COMORBIDITY (HCC): Status: RESOLVED | Noted: 2018-04-19 | Resolved: 2021-06-14

## 2021-06-14 PROCEDURE — 99213 OFFICE O/P EST LOW 20 MIN: CPT | Performed by: FAMILY MEDICINE

## 2021-06-14 NOTE — PROGRESS NOTES
Chief Complaint   Patient presents with    Follow-up    Diabetes     1. Have you been to the ER, urgent care clinic since your last visit? Hospitalized since your last visit? No    2. Have you seen or consulted any other health care providers outside of the [address] [place] [address] since your last visit? Include any pap smears or colon screening.  No

## 2021-06-14 NOTE — PROGRESS NOTES
Chief Complaint   Patient presents with    Diabetes     Fasting follow up    Cholesterol Problem       HISTORY OF PRESENT ILLNESS   HPI  Fasting 6 month follow up Type 2 NIDDM, Hypercholesterolemia, Labs and Medication Check. Diet: cut back on sugars and making healthier food choices  Exercise: walks ~ once a week x 30-45 minutes, stays active in her yard  Caffeine: 2-3 cups of coffee a day  Weight:  happy her wt is coming down w/ eating healthier and being more active in recent years  Checks BS's 4-5 x a week  Fasting in AM: 112, 133, 126, 138, 178, 161, 146, 142  2 hrs post prandial: 141, 176, 132, 135  No hypoglycemia  Has had some stress at work  Also since working from home the past year she has started drinking more coffee, 2-3 cups of day whereas before she would only have some on the weekends  Was wondering about weaning off Prilosec but some indigestion and burning so will hold off through the end of the year or until she retires hopefully next June  She is scheduled to see Endo Dr. Jesusita Miller for thyroid evaluation next week on 6/21, persistent low TSH  No obstructive symptoms     REVIEW OF SYMPTOMS   Review of Systems   Constitutional: Negative. Eyes: Negative. Respiratory: Negative. Cardiovascular: Negative. Gastrointestinal: Negative for abdominal pain, blood in stool, constipation, diarrhea, melena, nausea and vomiting. Genitourinary: Negative. Musculoskeletal: Negative for myalgias. Neurological: Negative for dizziness and headaches. Endo/Heme/Allergies: Negative.             PROBLEM LIST/MEDICAL HISTORY     Problem List  Date Reviewed: 6/14/2021        Codes Class Noted    Mixed hyperlipidemia ICD-10-CM: E78.2  ICD-9-CM: 272.2  3/26/2020        Hiatal hernia with GERD and esophagitis ICD-10-CM: K44.9, K21.00  ICD-9-CM: 553.3, 530.11  3/26/2020    Overview Signed 3/26/2020  8:48 AM by Bianca Mcdowell MD     EGD 2016             Tendonitis, Achilles, left ICD-10-CM: M76.62  ICD-9-CM: 726.71  1/9/2019    Overview Signed 1/9/2019  8:33 AM by Link Shen MD     CSI Dr Codi Russell              History of SCC (squamous cell carcinoma) of skin ICD-10-CM: G73.879  ICD-9-CM: V10.83  1/9/2019    Overview Signed 1/9/2019  9:24 AM by Link Shen MD     Skin checks and cancer screenings per [facility]. Dr. Cherie Aldridge             Heel spur, left ICD-10-CM: M77.32  ICD-9-CM: 726.73  9/19/2018    Overview Signed 9/19/2018  8:49 AM by Link Shen MD     2/2018, Dr Codi Russell             Tendonitis, Achilles, right ICD-10-CM: M76.61  ICD-9-CM: 726.71  4/19/2018    Overview Signed 4/19/2018  9:24 AM by Link Shen MD     2-2018: Dr Codi Russell, bone spur chipped             Severe obesity (BMI 35.0-39. 9) with comorbidity ([facility] [address] ICD-10-CM: E66.01  ICD-9-CM: 278.01  4/19/2018        Bone spurs of feet  ICD-10-CM: M77.50  ICD-9-CM: 726.91  2/24/2017    Overview Signed 2/24/2017 10:56 AM by Link Shen MD     Podiatry Dr Vivi Marquez             Diabetes mellitus type 2, noninsulin dependent ([facility] ICD-10-CM: E11.9  ICD-9-CM: 250.00  12/10/2015    Overview Addendum 12/10/2020 11:56 AM by Link Shen MD     3/2001, Eye doctor Dr. Bird SANON             Vitamin D deficiency ICD-10-CM: E55.9  ICD-9-CM: 268.9  12/11/2013    Overview Signed 12/11/2013  1:27 PM by Link Shen MD     12/2013               Postmenopause, LMP age ~ 45 yo, No HRT ICD-10-CM: Z78.0  ICD-9-CM: V49.81  12/5/2012        History of abnormal Pap smear, s/p Cryotherapy in her 27's ICD-10-CM: Z87.898  ICD-9-CM: V13.29  12/5/2012        Stress incontinence ICD-10-CM: N39.3  ICD-9-CM: Saintclair Spatz  12/5/2012    Overview Signed 12/5/2012  9:55 AM by Link Shen MD     Since child bearing years             Left sided sciatica ICD-10-CM: M54.32  ICD-9-CM: 724.3  12/5/2012    Overview Signed 12/5/2012 10:25 AM by Link Shen MD     12/2012 S/p colonoscopy with polypectomy and diverticulosis 4/2010 ICD-10-CM: Z98.890  ICD-9-CM: V45.89  4/27/2010    Overview Signed 4/27/2010  9:16 AM by MD Dr Юлия Choudhury             Hiatal Hernia, Gastritis, mild; BX neg for H. Pylori 4/2010 ICD-10-CM: K29.70  ICD-9-CM: 535.50  4/27/2010    Overview Signed 4/27/2010  9:17 AM by MD Dr Юлия Choudhury             Right knee pain; damage at medial meniscus; 2010 ICD-10-CM: M25.561  ICD-9-CM: 719.46  4/27/2010    Overview Signed 4/27/2010  9:19 AM by MD Dr Kennedy Choudhury; P.T.              Left foot pain 2009; s/p CSI ICD-10-CM: P01.813  ICD-9-CM: 729.5  4/27/2010    Overview Signed 4/27/2010  9:20 AM by Rae Oka, MD Dr Merlinda Cash             Uterine fibroid ICD-10-CM: D25.9  ICD-9-CM: 218.9  Unknown        Meniere's disease ICD-10-CM: H81.09  ICD-9-CM: 386.00  Unknown    Overview Signed 5/24/2016 12:30 PM by Jduit Jay MD     ENT ~ 2003, Dr Meera Reed             FH: melanoma ICD-10-CM: Z80.8  ICD-9-CM: V16.8  Unknown    Overview Signed 1/9/2019  9:25 AM by Judit Jay MD     Skin cancer screenings, Derm Dr Antolin Gregory             GERD (gastroesophageal reflux disease) ICD-10-CM: K21.9  ICD-9-CM: 530.81  Unknown                  PAST SURGICAL HISTORY     Past Surgical History:   Procedure Laterality Date    HX BREAST BIOPSY Right 06/2014    Benign, Xiomy Calender Mac    HX BREAST LUMPECTOMY   1986    benign fibroadenoma right breast    [address]    Emergency for fetal distress    HX COLONOSCOPY  ~2010    Dr Юлия Estrada; Normal except Diverticulosis, but f/u 5 yrs d/t Fhx    HX COLONOSCOPY  07/11/2016    Polyps, Multiple Diverticulae, Internal Hemorrhoids; f/u 5 yrs, Dr Gabriela Godoy  10/8/14    AUB & Uterine Polyp    HX ENDOSCOPY  07/11/2016    EGD: Hiatal Hernia, Grade 1 Esophagitis w/ reflux, Gastritis,  Dr Rodolfo SMITH LAP CHOLECYSTECTOMY  1998    gallstones    HX SKIN BIOPSY  ~2016    SCC excised from chest wall, Dr. Mariluz Cid HYSTEROSCOPY,W/ENDO BX  5/2014    AUB; Negative         MEDICATIONS     Current Outpatient Medications   Medication Sig    pen needle, diabetic (NovoTwist) 32 gauge x 1/5\" ndle USE ONCE DAILY    Victoza 3-Manolo 0.6 mg/0.1 mL (18 mg/3 mL) pnij INJECT 1.8 MG (0.3 ML) SUBCUTANEOUSLY DAILY    Synjardy XR 10-1,000 mg TBph TAKE 1 TABLET BY MOUTH EVERY DAY IN THE MORNING WITH BREAKFAST    glucose blood VI test strips (Accu-Chek Nayeli Plus test strp) strip PER INSURANCE PREFERENCE, TEST 1X/D DX DIABETES E11.9    rosuvastatin (CRESTOR) 10 mg tablet Take 1 Tab by mouth nightly.  omeprazole (PRILOSEC) 20 mg capsule Take 1 Cap by mouth Daily (before breakfast).  cholecalciferol (VITAMIN D3) (2,000 UNITS /50 MCG) cap capsule Take 2,000 Units by mouth daily.  TURMERIC PO Take  by mouth daily.  melatonin 3 mg tablet Take 3 mg by mouth nightly.  OTHER,NON-FORMULARY, CBD Oil for sleep    krill-om-3-dha-epa-phospho-ast (MEGARED OMEGA-3 KRILL OIL) 1,000-230-60 mg cap Take 1 Cap by mouth daily.  diclofenac EC (VOLTAREN) 75 mg EC tablet Take 75 mg by mouth daily as needed. Per ortho for knee    diclofenac (VOLTAREN) 1 % gel 2 (TWO) GRAM FOUR TIMES DAILY     No current facility-administered medications for this visit.           ALLERGIES     Allergies   Allergen Reactions    Succinylcholine Other (comments)     Prolonged period of recovery following anesthesia    Glipizide Other (comments)     Frequent hypoglycemia    Other Medication Diarrhea     Intolerant of >1000 mg of Metformin    Phentermine Other (comments)     Dizziness and \"crashes\"          SOCIAL HISTORY     Social History     Tobacco Use    Smoking status: Former Smoker     Packs/day: 1.00     Years: 10.00     Pack years: 10.00     Types: Cigarettes     Quit date: 4/27/1991     Years since quittin.1    Smokeless tobacco: Never Used   Substance Use Topics    Alcohol use: Yes     Comment: 2 glasses wine maybe twice a week     Social History     Social History Narrative    Diabetes: Initial PPV23 - age 59; will need PCV 13 at age 71 yo and PPSV 21 again at 74 yo    Diet: cut back on sugars and making healthier food choices    Exercise: walks ~ once a week x 30-45 minutes, stays active in her yard    Caffeine: 2-3 cups of coffee a day    Weight:  happy her wt is coming down w/ eating healthier and being more active in recent years         Social History     Substance and Sexual Activity   Sexual Activity Yes    Partners: Male       IMMUNIZATIONS     Immunization History   Administered Date(s) Administered    Influenza Vaccine 10/16/2014, 10/22/2015, 2017, 2018, 10/24/2019    Influenza Vaccine (>6 mo Afluria QUAD Vial 77279 (0.25 mL) / 81190 (0.5 mL)) 10/01/2018    Influenza Vaccine (Quad) Mdck Pf (>4 Yrs Flucelvax QUAD 27894) 10/16/2018    Influenza Vaccine (Quad) PF (>6 Mo Flulaval, Fluarix, and >3 Yrs Afluria, Fluzone 51885) 10/24/2019, 2020    Influenza Vaccine Split 2009, 2012    Pneumococcal Polysaccharide (PPSV-23) 2018    TD Vaccine 2012    Zoster Recombinant 2019, 10/17/2019         FAMILY HISTORY     Family History   Problem Relation Age of Onset    COPD Mother         smoker    Colon Polyps Mother 72    Osteoporosis Mother     Stroke Father     Dementia Father         Alzheimer's    Heart Disease Father         PVD    Cancer Sister         melanoma    Diabetes Maternal Grandmother     Cancer Maternal Grandfather         lung    Hypertension Brother         living in his 66's         VITALS     Visit Vitals  /82 (BP 1 Location: Left upper arm, BP Patient Position: Sitting, BP Cuff Size: Adult)   Pulse 75   Temp 97.8 °F (36.6 °C) (Temporal)   Resp 18   Ht 5' 7\" (1.702 m)   Wt 220 lb 3.2 oz (99.9 kg)   SpO2 97% BMI 34.49 kg/m²          PHYSICAL EXAMINATION   Physical Exam  Vitals reviewed. Constitutional:       Appearance: Normal appearance. Cardiovascular:      Rate and Rhythm: Normal rate and regular rhythm. Pulses: Normal pulses. Dorsalis pedis pulses are 2+ on the right side and 2+ on the left side. Posterior tibial pulses are 2+ on the right side and 2+ on the left side. Heart sounds: Normal heart sounds. No murmur heard. No gallop. Pulmonary:      Effort: Pulmonary effort is normal.      Breath sounds: Normal breath sounds. Abdominal:      General: There is no distension. Palpations: Abdomen is soft. Tenderness: There is no abdominal tenderness. Musculoskeletal:         General: No swelling. Right lower leg: No edema. Left lower leg: No edema. Feet:      Right foot:      Protective Sensation: 5 sites tested. 5 sites sensed. Skin integrity: Skin integrity normal.      Left foot:      Protective Sensation: 5 sites tested. 5 sites sensed. Skin integrity: Skin integrity normal.   Skin:     General: Skin is warm and dry. Neurological:      Mental Status: She is alert.                 LABORATORY DATA/ANCILLARY/IMAGING     Results for orders placed or performed in visit on 02/03/21   T3 TOTAL   Result Value Ref Range    T3, total 113 71 - 180 ng/dL   TSH RECEPTOR AB   Result Value Ref Range    Thyrotropin Receptor Ab, serum <1.10 0.00 - 1.75 IU/L   TSH 3RD GENERATION   Result Value Ref Range    TSH 0.15 (L) 0.36 - 3.74 uIU/mL   T4, FREE   Result Value Ref Range    T4, Free 1.0 0.8 - 1.5 NG/DL       Lab Results   Component Value Date/Time    WBC 8.2 12/22/2020 10:04 AM    HGB 14.6 12/22/2020 10:04 AM    HCT 46.1 12/22/2020 10:04 AM    PLATELET 795 58/24/5284 10:04 AM    MCV 92.9 12/22/2020 10:04 AM     Lab Results   Component Value Date/Time    Hemoglobin A1c 6.3 (H) 12/22/2020 10:04 AM    Hemoglobin A1c 6.4 (H) 06/15/2020 09:02 AM    Hemoglobin A1c 6.7 (H) 03/26/2020 08:52 AM    Glucose 121 (H) 12/22/2020 10:04 AM    Microalbumin/Creat ratio (mg/g creat) 16 12/22/2020 10:04 AM    Microalbumin,urine random 1.45 12/22/2020 10:04 AM    LDL, calculated 58.4 12/22/2020 10:04 AM    Creatinine 0.85 12/22/2020 10:04 AM      Lab Results   Component Value Date/Time    Cholesterol, total 143 12/22/2020 10:04 AM    HDL Cholesterol 54 12/22/2020 10:04 AM    LDL, calculated 58.4 12/22/2020 10:04 AM    Triglyceride 153 (H) 12/22/2020 10:04 AM    CHOL/HDL Ratio 2.6 12/22/2020 10:04 AM     Lab Results   Component Value Date/Time    Sodium 140 12/22/2020 10:04 AM    Potassium 4.1 12/22/2020 10:04 AM    Chloride 107 12/22/2020 10:04 AM    CO2 29 12/22/2020 10:04 AM    Anion gap 4 (L) 12/22/2020 10:04 AM    Glucose 121 (H) 12/22/2020 10:04 AM    BUN 14 12/22/2020 10:04 AM    Creatinine 0.85 12/22/2020 10:04 AM    BUN/Creatinine ratio 16 12/22/2020 10:04 AM    GFR est AA >60 12/22/2020 10:04 AM    GFR est non-AA >60 12/22/2020 10:04 AM    Calcium 8.9 12/22/2020 10:04 AM    Bilirubin, total 0.5 12/22/2020 10:04 AM    ALT (SGPT) 29 12/22/2020 10:04 AM    Alk. phosphatase 73 12/22/2020 10:04 AM    Protein, total 7.1 12/22/2020 10:04 AM    Albumin 4.1 12/22/2020 10:04 AM    Globulin 3.0 12/22/2020 10:04 AM    A-G Ratio 1.4 12/22/2020 10:04 AM          ASSESSMENT & PLAN   Diagnoses and all orders for this visit:    1. Diabetes mellitus type 2, noninsulin dependent ([facility] [address]  -     HEMOGLOBIN A1C WITH EAG; Future  -     METABOLIC PANEL, BASIC; Future  -      DIABETES FOOT EXAM  Key Antihyperglycemic Medications             Victoza 3-Manolo 0.6 mg/0.1 mL (18 mg/3 mL) pnij (Taking) INJECT 1.8 MG (0.3 ML) SUBCUTANEOUSLY DAILY    Synjardy XR 10-1,000 mg TBph (Taking) TAKE 1 TABLET BY MOUTH EVERY DAY IN THE MORNING WITH BREAKFAST          2. Mixed hyperlipidemia  -     LIPID PANEL; Future  -     ALT; Future  -     AST;  Future  Key Meds             rosuvastatin (CRESTOR) 10 mg tablet (Taking) Take 1 Tab by mouth nightly. krill-om-3-dha-epa-phospho-ast (MEGARED OMEGA-3 KRILL OIL) 1,000-230-60 mg cap (Taking) Take 1 Cap by mouth daily. 3. Encounter for screening mammogram for breast cancer  -     [facility] 3D CHAS[facility] W MAMMO BI SCREENING INCL CAD; Future    Fasting labs checked today  Cardiovascular risk and specific lipid/LDL/BS/Hgba1c goals reviewed  Reviewed diet, nutrition, exercise, weight management, BMI/goals. Age/risk based screening recommendations, health maintenance & prevention counseling. Cancer screening USPTFS guidelines reviewed w/ pt today. Discussed benefits/positive/negative outcomes of screening based on age/risk stratification. Informed consent for/against screening based on pt's personal hx/risk factors. Updated in history above and health maintenance. Reviewed medications and side effects  Further follow up & other recommendations pending review of labs.  If all remains good and stable, follow up in 6 months, sooner prn

## 2021-06-15 LAB
ALT SERPL-CCNC: 26 U/L (ref 12–78)
ANION GAP SERPL CALC-SCNC: 7 MMOL/L (ref 5–15)
AST SERPL-CCNC: 18 U/L (ref 15–37)
BUN SERPL-MCNC: 16 MG/DL (ref 6–20)
BUN/CREAT SERPL: 20 (ref 12–20)
CALCIUM SERPL-MCNC: 9.2 MG/DL (ref 8.5–10.1)
CHLORIDE SERPL-SCNC: 107 MMOL/L (ref 97–108)
CHOLEST SERPL-MCNC: 160 MG/DL
CO2 SERPL-SCNC: 26 MMOL/L (ref 21–32)
CREAT SERPL-MCNC: 0.81 MG/DL (ref 0.55–1.02)
EST. AVERAGE GLUCOSE BLD GHB EST-MCNC: 137 MG/DL
GLUCOSE SERPL-MCNC: 131 MG/DL (ref 65–100)
HBA1C MFR BLD: 6.4 % (ref 4–5.6)
HDLC SERPL-MCNC: 56 MG/DL
HDLC SERPL: 2.9 {RATIO} (ref 0–5)
LDLC SERPL CALC-MCNC: 65.2 MG/DL (ref 0–100)
POTASSIUM SERPL-SCNC: 3.9 MMOL/L (ref 3.5–5.1)
SODIUM SERPL-SCNC: 140 MMOL/L (ref 136–145)
TRIGL SERPL-MCNC: 194 MG/DL (ref ?–150)
VLDLC SERPL CALC-MCNC: 38.8 MG/DL

## 2021-06-21 ENCOUNTER — OFFICE VISIT (OUTPATIENT)
Dept: ENDOCRINOLOGY | Age: 61
End: 2021-06-21
Payer: COMMERCIAL

## 2021-06-21 VITALS
WEIGHT: 222 LBS | SYSTOLIC BLOOD PRESSURE: 141 MMHG | BODY MASS INDEX: 34.84 KG/M2 | HEIGHT: 67 IN | HEART RATE: 87 BPM | DIASTOLIC BLOOD PRESSURE: 77 MMHG

## 2021-06-21 DIAGNOSIS — R94.6 BORDERLINE ABNORMAL THYROID FUNCTION TEST: Primary | ICD-10-CM

## 2021-06-21 PROCEDURE — 99204 OFFICE O/P NEW MOD 45 MIN: CPT | Performed by: INTERNAL MEDICINE

## 2021-06-21 NOTE — PROGRESS NOTES
Chief Complaint   Patient presents with    Thyroid Problem     NEW PATIENT     History of Present Illness: Radha Colon is a 56 y.o. female who is a new patient for thyroid. Review of her labs since 2009 shows that her TSH had always been normal until 12/20 when it was slightly low at 0.26. It was repeated in 2/21 and was lower at 0.15 and had a normal T3 of 113 and FT4 of 1.0 and TSH receptor ab < 1.1. She has a FH of hypothyroidism in her sister. No anterior neck pain or swelling. Has had some occasional difficulty swallowing possibly due to her GERD but hasn't had this recently. Has had some higher BP readings recently and some very mild chest discomfort but no palpitations or tremors. Bowels have been more constipated over the past few months since this winter but not loose and getting a little better with summer veggies. Prior to this had more trouble with looser bowels. Did tend to stay more hot natured until this past winter and then felt colder and currently with the warmer weather feels more comfortable. No hair loss. Some brittle nails that are unchanged. Sleep hasn't been good but job has been more stressful. Weight down from 239 in 12/19 to 222 today and some of this weight loss may be unintentional.  Has never had any imaging of her thyroid.       Past Medical History:   Diagnosis Date    Bone spur of ankle 2018    Bone spurs of feet  2/24/2017    Podiatry Dr Mayda Hollins    Diabetes mellitus type 2, noninsulin dependent [facility]) 12/10/2015    3/2001    Diverticulosis age 26    FH: melanoma     also FH: [address]    Gestational diabetes 1998    Heel spur, left 9/19/2018 2/2018, Dr Barb Turner Hemorrhoids     Hiatal hernia with GERD and esophagitis 3/26/2020    EGD 2016    Hiatal Hernia, Gastritis, mild; BX neg for H. Pylori 4/2010 4/27/2010    History of abnormal Pap smear, s/p Cryotherapy in her 30's 12/5/2012    History of SCC (squamous cell carcinoma) of skin 1/9/2019    Skin checks and cancer screenings per [facility]. Dr. Zeke Jay Hx of colonoscopy 7/11/2016    [facility] Dr. Joey Rodriguez Left foot pain 2009; s/p CSI 4/27/2010    Left sided sciatica 12/5/2012    Meniere's disease     Mixed hyperlipidemia 3/26/2020   Di Morris, LMP age ~ 43 yo, No HRT 12/5/2012    Right knee pain; damage at medial meniscus; 2010 4/27/2010    S/p colonoscopy with polypectomy 4/2010 4/27/2010    Stress fracture foot     left    Stress incontinence 12/5/2012    Tendonitis, Achilles, left 1/9/2019    [facility] Dr Morgan Murphy     Tendonitis, Achilles, right 4/19/2018 2-2018: Dr Morgan Murphy, bone spur chipped    Uterine fibroid     Vitamin D deficiency 12/11/2013 12/2013      Past Surgical History:   Procedure Laterality Date    HX BREAST BIOPSY Right 06/2014    Benign, Yemi Chyle    HX BREAST LUMPECTOMY   1986    benign fibroadenoma right breast    [address]    Emergency for fetal distress    HX COLONOSCOPY  ~2010    Dr Jocelyne Camarillo; Normal except Diverticulosis, but f/u 5 yrs d/t Fhx    HX COLONOSCOPY  07/11/2016    Polyps, Multiple Diverticulae, Internal Hemorrhoids; f/u 5 yrs, Dr Charon Najjar  10/8/14    AUB & Uterine Polyp    HX ENDOSCOPY  07/11/2016    EGD: Hiatal Hernia, Grade 1 Esophagitis w/ reflux, Gastritis,  Dr Anant Moss    gallstones    HX SKIN BIOPSY  ~2016    SCC excised from chest wall, Dr. Jon Mcknight HYSTEROSCOPY,W/ENDO BX  5/2014    AUB; Negative     Current Outpatient Medications   Medication Sig    fluticasone propionate (FLONASE NA) by Nasal route.  Victoza 3-Manolo 0.6 mg/0.1 mL (18 mg/3 mL) pnij INJECT 1.8 MG (0.3 ML) SUBCUTANEOUSLY DAILY    Synjardy XR 10-1,000 mg TBph TAKE 1 TABLET BY MOUTH EVERY DAY IN THE MORNING WITH BREAKFAST    rosuvastatin (CRESTOR) 10 mg tablet Take 1 Tab by mouth nightly.     omeprazole (PRILOSEC) 20 mg capsule Take 1 Cap by mouth Daily (before breakfast).  cholecalciferol (VITAMIN D3) (2,000 UNITS /50 MCG) cap capsule Take 2,000 Units by mouth daily.  TURMERIC PO Take  by mouth daily.  melatonin 3 mg tablet Take 3 mg by mouth nightly.  OTHER,NON-FORMULARY, CBD Oil for sleep    krill-om-3-dha-epa-phospho-ast (MEGARED OMEGA-3 KRILL OIL) 1,000-230-60 mg cap Take 1 Cap by mouth daily.  pen needle, diabetic (NovoTwist) 32 gauge x 1/5\" ndle USE ONCE DAILY    glucose blood VI test strips (Accu-Chek Nayeli Plus test strp) strip PER INSURANCE PREFERENCE, TEST 1X/D DX DIABETES E11.9     No current facility-administered medications for this visit.      Allergies   Allergen Reactions    Succinylcholine Other (comments)     Prolonged period of recovery following anesthesia    Glipizide Other (comments)     Frequent hypoglycemia    Other Medication Diarrhea     Intolerant of >1000 mg of Metformin    Phentermine Other (comments)     Dizziness and \"crashes\"     Family History   Problem Relation Age of Onset    COPD Mother         smoker    Colon Polyps Mother 72    Osteoporosis Mother     Stroke Father     Dementia Father         Alzheimer's    Heart Disease Father         PVD    Cancer Sister         melanoma    Thyroid Disease Sister         hypothyroidism    Diabetes Maternal Grandmother     Cancer Maternal Grandfather         lung    Hypertension Brother         living in his 66's     Social History     Socioeconomic History    Marital status:      Spouse name: Not on file    Number of children: 3    Years of education: Not on file    Highest education level: Not on file   Occupational History    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[employer], plans to retire 2022   Tobacco Use    Smoking status: Former Smoker     Packs/day: 1.00     Years: 10.00     Pack years: 10.00     Types: Cigarettes     Quit date: 1991     Years since quittin.1    Smokeless tobacco: Never Used   Vaping Use    Vaping Use: Never used   Substance and Sexual Activity    Alcohol use: Yes     Comment: 2-3 glasses wine 5 days a week    Drug use: No    Sexual activity: Yes     Partners: Male   Other Topics Concern     Service Not Asked    Blood Transfusions Not Asked    Caffeine Concern No     Comment: occ coffee, mostly just on weekends    Occupational Exposure Not Asked    Hobby Hazards Not Asked    Sleep Concern Not Asked    Stress Concern Not Asked    Weight Concern Yes     Comment: happy her wt is coming down w/ eating healthier and being more active    Special Diet Yes     Comment: cut back on sugars and making healthier food choices    Back Care Not Asked    Exercise No     Comment: nothing regular right now    Bike Helmet Not Asked    Seat Belt Not Asked    Self-Exams Not Asked   Social History Narrative    Diabetes: Initial PPV23 4-2018 age 59; will need PCV 13 at age 69 yo and PPSV 21 again at 76 yo    Diet: cut back on sugars and making healthier food choices    Exercise: walks ~ once a week x 30-45 minutes, stays active in her yard    Caffeine: 2-3 cups of coffee a day    Weight:  happy her wt is coming down w/ eating healthier and being more active in recent years        Lives in the [place] [id number] with . Has 2 sons and 1 daughter. Works for [employer] in [employer] [facility] [place]. Likes to garden and music and theater and reading. Social Determinants of Health     Financial Resource Strain:     Difficulty of Paying Living Expenses:    Food Insecurity:     Worried About Running Out of Food in the Last Year:     [address] in the Last Year:    Transportation Needs:     Lack of Transportation (Medical):      Lack of Transportation (Non-Medical):    Physical Activity:     Days of Exercise per Week:     Minutes of Exercise per Session:    Stress:     Feeling of Stress :    Social Connections:     Frequency of Communication with Friends and Family:     Frequency of Social Gatherings with Friends and Family:     Attends Gnosticism Services:     Active Member of Clubs or Organizations:     Attends Club or Organization Meetings:     Marital Status:    Intimate Partner Violence:     Fear of Current or Ex-Partner:     Emotionally Abused:     Physically Abused:     Sexually Abused:      Review of Systems: per HPI    Physical Examination:  Blood pressure (!) 141/77, pulse 87, height 5' 7\" (1.702 m), weight 222 lb (100.7 kg). - General: pleasant, no distress, good eye contact  - HEENT: no exopthalmos, no periorbital edema, no scleral/conjunctival injection, EOMI, no lid lag or stare  - Neck: supple, (+) palpable thyroid gland but no nodules or thyroid bruits, no masses, lymph nodes, or carotid bruits, no supraclavicular or dorsocervical fat pads  - Cardiovascular: regular, normal rate, normal S1 and S2, no murmurs/rubs/gallops   - Respiratory: clear to auscultation bilaterally  - Gastrointestinal: soft, nontender, nondistended, no masses, no hepatosplenomegaly  - Musculoskeletal: no proximal muscle weakness in upper or lower extremities  - Integumentary: no acanthosis nigricans, no abdominal striae, no rashes, no edema  - Neurological: reflexes 2+ at biceps, no tremor  - Psychiatric: normal mood and affect    Data Reviewed:   Component      Latest Ref Rng & Units 2/3/2021 12/22/2020 12/9/2019 1/9/2019   TSH      0.36 - 3.74 uIU/mL 0.15 (L) 0.26 (L) 0.781 0.798   T3, total      71 - 180 ng/dL 113      Thyrotropin Receptor Ab, serum      0.00 - 1.75 IU/L <1.10      T4, Free      0.8 - 1.5 NG/DL 1.0          Assessment/Plan:   1. Borderline abnormal thyroid function test: Review of her labs since 2009 shows that her TSH had always been normal until 12/20 when it was slightly low at 0.26. It was repeated in 2/21 and was lower at 0.15 and had a normal T3 of 113 and FT4 of 1.0 and TSH receptor ab < 1.1. She has a FH of hypothyroidism in her sister.  Has never had any imaging of her thyroid. Will start by repeating her TFTs and screen for Hashimoto's and obtain a thyroid ultrasound to look for any nodules as the cause of her slightly low TSH values. - check TSH, free T4, total T3 and Thyroid antibody panel today  - order thyroid ultrasound            Patient Instructions   1) Your TSH (thyroid test) was slightly low at 0.26 in 12/20 and lower at 0.15 in 2/21. We'll repeat this today along with your free T4 and T3 level and thyroid antibody level to see if you truly have a thyroid condition that needs further evaluation or treatment. 2) I will order a thyroid ultrasound to look at the size of your thyroid and for any nodules that could be the cause of your TSH going low. Please call the Patient Care team at [phone] to schedule this appointment at your earliest convenience    3) I will send you a message through [address] with your lab results and ultrasound to determine a plan. 4) The address is [address] [place] [address] Av in the [facility] ([facility]). Follow-up and Dispositions    · Return in about 6 months (around 12/21/2021), or [address] [place] [address] office. Copy sent to:  Teresita Aguillon MD    Lab follow up: 6/26/21  Component      Latest Ref Rng & Units 6/21/2021 6/21/2021 6/21/2021 6/21/2021          11:50 AM 11:50 AM 11:50 AM 11:50 AM   Thyroid peroxidase Ab      0 - 34 IU/mL <9      Thyroglobulin Ab      0.0 - 0.9 IU/mL <1.0      T3, total      71 - 180 ng/dL  116     TSH      0.36 - 3.74 uIU/mL    0.20 (L)   T4, Free      0.8 - 1.5 NG/DL   0.9      EXAM: US THYROID/PARATHYROID/SOFT TISS      INDICATION: Borderline abnormal thyroid function test.     COMPARISON: None.     TECHNIQUE: Real-time sonography of the thyroid gland was performed with a high  frequency linear transducer. Multiple static images were obtained.     FINDINGS:  Background thyroid parenchyma is heterogeneous.  Multiple nodules are present  bilaterally. Vascularity is normal.  In the right lobe there is a 1.8 x 2.0 x 1.3 cm solid isoechoic nodule with mild  vascularity. In the left lobe there is a 1.0 x 0.7 x 0.5 cm upper pole solid nodule and a 1.1  x 0.6 x 0.6 cm solid lower pole nodule.     The right lobe measures 5.4 x 2.3 x 1.8 cm and the left lobe measures 4.9 x 1.4  x 1.3 cm. The isthmus measures 0.4 cm.     IMPRESSION  Multinodular thyroid gland, the largest nodule in the right lobe measuring 1.8 x  2.0 x 1.3 cm.  -------------------------------------------------------------------------------------------------------------------  TSH is a thyroid test.  Your level is 0.20 which is still low and below goal of 0.5-2.0 but up from 0.15 in 2/21. This test goes opposite of your thyroid function and suggests you have mild hyperthyroidism (fast metabolism). As long as your level remains above 0.10, we can follow this over time without treatment. Your thyroid peroxidase (TPO) antibody and thyroglobulin antibody levels were also normal which indicates that you do not have an autoimmune thyroid disease called Hashimoto's disease  -------------------------------------------------------------------------------------------------------------------  Your thyroid ultrasound does confirm that you have what is called a multinodular goiter where there are several nodules in the thyroid that can cause slightly high levels of thyroid hormone to be released into your blood stream.  The risk of thyroid cancer in these nodules is quite low so I think we can follow with a repeat ultrasound in 1 year to make sure the nodules are not growing or changing.   We will need to monitor your thyroid hormone levels over time as these nodules can become more overactive as the years go on and cause your TSH to go under 0.10 which would warrant treatment for hyperthyroidism to prevent any trouble with irregular heart rhythm (atrial fibrillation) or bone loss (osteoporosis). -------------------------------------------------------------------------------------------------------------------  I put an order directly into the [facility] system to repeat your labs in the 1-2 weeks prior to your next visit so just ask for the order under my name and you will receive a courtesy reminder through [facility] to have these drawn.

## 2021-06-22 ENCOUNTER — HOSPITAL ENCOUNTER (OUTPATIENT)
Dept: ULTRASOUND IMAGING | Age: 61
Discharge: HOME | End: 2021-06-22
Payer: COMMERCIAL

## 2021-06-22 DIAGNOSIS — R94.6 BORDERLINE ABNORMAL THYROID FUNCTION TEST: ICD-10-CM

## 2021-06-22 PROCEDURE — 76536 US EXAM OF HEAD AND NECK: CPT | Performed by: INTERNAL MEDICINE

## 2021-07-14 DIAGNOSIS — E78.2 MIXED HYPERLIPIDEMIA: ICD-10-CM

## 2021-07-14 RX ORDER — ROSUVASTATIN CALCIUM 10 MG/1
TABLET, COATED ORAL
Qty: 90 TABLET | Refills: 1 | Status: SHIPPED | OUTPATIENT
Start: 2021-07-14 | End: 2022-01-12

## 2021-09-22 DIAGNOSIS — E11.9 DIABETES MELLITUS TYPE 2, NONINSULIN DEPENDENT (HCC): ICD-10-CM

## 2021-09-22 RX ORDER — LIRAGLUTIDE 6 MG/ML
INJECTION SUBCUTANEOUS
Qty: 3 ML | Refills: 3 | Status: SHIPPED | OUTPATIENT
Start: 2021-09-22 | End: 2022-02-02

## 2021-09-25 DIAGNOSIS — E11.9 DIABETES MELLITUS TYPE 2, NONINSULIN DEPENDENT (HCC): ICD-10-CM

## 2021-09-26 RX ORDER — EMPAGLIFLOZIN, METFORMIN HYDROCHLORIDE 10; 1000 MG/1; MG/1
TABLET, EXTENDED RELEASE ORAL
Qty: 90 EACH | Refills: 0 | Status: SHIPPED | OUTPATIENT
Start: 2021-09-26 | End: 2022-01-23

## 2021-12-06 ENCOUNTER — OFFICE VISIT (OUTPATIENT)
Dept: FAMILY MEDICINE CLINIC | Age: 61
End: 2021-12-06
Payer: COMMERCIAL

## 2021-12-06 VITALS
BODY MASS INDEX: 31.92 KG/M2 | TEMPERATURE: 96.9 F | HEIGHT: 67 IN | DIASTOLIC BLOOD PRESSURE: 68 MMHG | SYSTOLIC BLOOD PRESSURE: 102 MMHG | WEIGHT: 203.4 LBS | RESPIRATION RATE: 18 BRPM | HEART RATE: 70 BPM | OXYGEN SATURATION: 98 %

## 2021-12-06 DIAGNOSIS — E11.9 DIABETES MELLITUS TYPE 2, NONINSULIN DEPENDENT (HCC): Primary | ICD-10-CM

## 2021-12-06 DIAGNOSIS — E78.2 MIXED HYPERLIPIDEMIA: ICD-10-CM

## 2021-12-06 LAB
ALT SERPL-CCNC: 30 U/L (ref 12–78)
ANION GAP SERPL CALC-SCNC: 9 MMOL/L (ref 5–15)
AST SERPL-CCNC: 17 U/L (ref 15–37)
BUN SERPL-MCNC: 15 MG/DL (ref 6–20)
BUN/CREAT SERPL: 16 (ref 12–20)
CALCIUM SERPL-MCNC: 9.4 MG/DL (ref 8.5–10.1)
CHLORIDE SERPL-SCNC: 107 MMOL/L (ref 97–108)
CHOLEST SERPL-MCNC: 147 MG/DL
CO2 SERPL-SCNC: 26 MMOL/L (ref 21–32)
CREAT SERPL-MCNC: 0.92 MG/DL (ref 0.55–1.02)
GLUCOSE SERPL-MCNC: 110 MG/DL (ref 65–100)
HBA1C MFR BLD HPLC: 5.9 %
HDLC SERPL-MCNC: 59 MG/DL
HDLC SERPL: 2.5 {RATIO} (ref 0–5)
LDLC SERPL CALC-MCNC: 56.6 MG/DL (ref 0–100)
POTASSIUM SERPL-SCNC: 4.1 MMOL/L (ref 3.5–5.1)
SODIUM SERPL-SCNC: 142 MMOL/L (ref 136–145)
TRIGL SERPL-MCNC: 157 MG/DL (ref ?–150)
VLDLC SERPL CALC-MCNC: 31.4 MG/DL

## 2021-12-06 PROCEDURE — 99213 OFFICE O/P EST LOW 20 MIN: CPT | Performed by: FAMILY MEDICINE

## 2021-12-06 PROCEDURE — 83036 HEMOGLOBIN GLYCOSYLATED A1C: CPT | Performed by: FAMILY MEDICINE

## 2021-12-06 NOTE — PROGRESS NOTES
Identified pt with two pt identifiers(name and ). Reviewed record in preparation for visit and have obtained necessary documentation. Chief Complaint   Patient presents with    Diabetes     6 mth    Cholesterol Problem        Vitals:    21 0803   Weight: 203 lb 6.4 oz (92.3 kg)   Height: 5' 7\" (1.702 m)   PainSc:   0 - No pain       Health Maintenance Due   Topic    Cervical cancer screen     Breast Cancer Screen Mammogram     Flu Vaccine (1)    COVID-19 Vaccine (3 - Booster for Camacho Peter series)    MICROALBUMIN Q1        Coordination of Care Questionnaire:  :   1) Have you been to an emergency room, urgent care, or hospitalized since your last visit? If yes, where when, and reason for visit? no       2. Have seen or consulted any other health care provider since your last visit? If yes, where when, and reason for visit? YES [facility]    3. For patients over 45: Has the patient had a colonoscopy? Yes, HM satisfied with blue hyperlink     If the patient is female:    4. For patients over 40: Has the patient had a mammogram? No    5. For patients over 21: Has the patient had a pap smear? No    Patient is accompanied by self I have received verbal consent from [address] [place] [address],[address] to discuss any/all medical information while they are present in the room.

## 2021-12-06 NOTE — PROGRESS NOTES
Chief Complaint   Patient presents with    Diabetes     6 month follow up    Cholesterol Problem     fasting       HISTORY OF PRESENT ILLNESS   HPI  Fasting Follow Up Type 2 NIDDM, Hyperlipidemia  Complying routinely w/ medications and tolerating w/o reaction or side effects  Checks BS's 1-2 x a week  Fasting in AM runs  which are much better for her over time  Diet: Noom Diet since 7-2021  Exercise: since 6-2021 walks 3-5 x a week x 20 minutes  Caffeine: 2-3 cups of coffee a day  Weight: happy her wt has been gradually coming down w/ eating healthier over the years and increased exercise the past few months; down from the 240 lb range in 2019 to 203 now   REVIEW OF SYMPTOMS   Review of Systems   Constitutional: Negative. Eyes: Negative. Respiratory: Negative. Cardiovascular: Negative. Gastrointestinal: Negative. Genitourinary: Negative. Musculoskeletal: Negative for myalgias. Neurological: Negative. Endo/Heme/Allergies: Negative. PROBLEM LIST/MEDICAL HISTORY     Problem List  Date Reviewed: 12/6/2021          Codes Class Noted    Mixed hyperlipidemia ICD-10-CM: E78.2  ICD-9-CM: 272.2  3/26/2020        Hiatal hernia with GERD and esophagitis ICD-10-CM: K44.9, K21.00  ICD-9-CM: 553.3, 530.11  3/26/2020    Overview Signed 3/26/2020  8:48 AM by Christiano Wilburn MD     EGD 2016             Tendonitis, Achilles, left ICD-10-CM: M76.62  ICD-9-CM: 726.71  1/9/2019    Overview Signed 1/9/2019  8:33 AM by Christiano Wilburn MD     I Dr Kim Nguyen              History of SCC (squamous cell carcinoma) of skin ICD-10-CM: R02.417  ICD-9-CM: V10.83  1/9/2019    Overview Signed 1/9/2019  9:24 AM by Christiano Wilburn MD     Skin checks and cancer screenings per Isaiah.  Dr. Chen Score             Heel spur, left ICD-10-CM: M77.32  ICD-9-CM: 726.73  9/19/2018    Overview Signed 9/19/2018  8:49 AM by Christiano Wilburn MD     2/2018, Dr Kim Nguyen Tendonitis, Achilles, right ICD-10-CM: M76.61  ICD-9-CM: 726.71  4/19/2018    Overview Signed 4/19/2018  9:24 AM by Carina Mart MD     2-2018: Dr Duy Berger, bone spur chipped             Bone spurs of feet  ICD-10-CM: M77.50  ICD-9-CM: 726.91  2/24/2017    Overview Signed 2/24/2017 10:56 AM by Carina Mart MD     Podiatry Dr Andrea Thomas             Diabetes mellitus type 2, noninsulin dependent ([facility] ICD-10-CM: E11.9  ICD-9-CM: 250.00  12/10/2015    Overview Addendum 12/10/2020 11:56 AM by Carina Mart MD     3/2001, Eye doctor Dr. Katt SANON             Vitamin D deficiency ICD-10-CM: E55.9  ICD-9-CM: 268.9  12/11/2013    Overview Signed 12/11/2013  1:27 PM by Carina Mart MD     12/2013               Postmenopause, LMP age ~ 45 yo, No HRT ICD-10-CM: Z78.0  ICD-9-CM: V49.81  12/5/2012        History of abnormal Pap smear, s/p Cryotherapy in her 27's ICD-10-CM: Z87.898  ICD-9-CM: V13.29  12/5/2012        Stress incontinence ICD-10-CM: N39.3  ICD-9-CM: Lashanda Hint  12/5/2012    Overview Signed 12/5/2012  9:55 AM by Carina Mart MD     Since child bearing years             Left sided sciatica ICD-10-CM: M54.32  ICD-9-CM: 724.3  12/5/2012    Overview Signed 12/5/2012 10:25 AM by Carina Mart MD     12/2012             S/p colonoscopy with polypectomy and diverticulosis 4/2010 ICD-10-CM: Z98.890  ICD-9-CM: V45.89  4/27/2010    Overview Signed 4/27/2010  9:16 AM by MD Dr Elmer Ba             Hiatal Hernia, Gastritis, mild; BX neg for H. Pylori 4/2010 ICD-10-CM: K29.70  ICD-9-CM: 535.50  4/27/2010    Overview Signed 4/27/2010  9:17 AM by MD Dr Elmer Ba             Right knee pain; damage at medial meniscus; 2010 ICD-10-CM: M25.561  ICD-9-CM: 719.46  4/27/2010    Overview Signed 4/27/2010  9:19 AM by MD Dr Floresita Ba; P.T.              Left foot pain 2009; s/p CSI ICD-10-CM: O15.306  ICD-9-CM: 729.5  4/27/2010    Overview Signed 4/27/2010  9:20 AM by MD Dr Venus Holman             Uterine fibroid ICD-10-CM: D25.9  ICD-9-CM: 218.9  Unknown        Meniere's disease ICD-10-CM: H81.09  ICD-9-CM: 386.00  Unknown    Overview Signed 5/24/2016 12:30 PM by Daniel Mallory MD     ENT ~ 2003, Dr Poli Campbell             FH: melanoma ICD-10-CM: Z80.8  ICD-9-CM: V16.8  Unknown    Overview Signed 1/9/2019  9:25 AM by Daniel Mallory MD     Skin cancer screenings, Derm Dr Luiz Michael             GERD (gastroesophageal reflux disease) ICD-10-CM: K21.9  ICD-9-CM: 530.81  Unknown                  PAST SURGICAL HISTORY     Past Surgical History:   Procedure Laterality Date    HX BREAST BIOPSY Right 06/2014    Benign, Lorrie Jenks Mac    HX BREAST LUMPECTOMY   1986    benign fibroadenoma right breast    [address]    Emergency for fetal distress    HX COLONOSCOPY  ~2010    Dr Lizett Mcduffie; Normal except Diverticulosis, but f/u 5 yrs d/t Fhx    HX COLONOSCOPY  07/11/2016    Polyps, Multiple Diverticulae, Internal Hemorrhoids; f/u 5 yrs, Dr Romero Adames HX COLONOSCOPY  11/30/2021    Diverticulosis, incomplete study due to poor prep, repeat 1 yr    HX DILATION AND CURETTAGE  10/8/14    AUB & Uterine Polyp    HX ENDOSCOPY  07/11/2016    EGD: Hiatal Hernia, Grade 1 Esophagitis w/ reflux, Gastritis,  Dr Janny Swanson  11/30/2021    HX LAP CHOLECYSTECTOMY  1998    gallstones    HX SKIN BIOPSY  ~2016    SCC excised from chest wall, Dr. Nyla Clemons HYSTEROSCOPY,W/ENDO [place] 9  5/2014    AUB; Negative         MEDICATIONS     Current Outpatient Medications   Medication Sig    OTHER Indications: FLUOROUCIL/SALICYLIC ACID (ALT) To remove warts    MAGNESIUM CITRATE PO Take  by mouth nightly.     Synjardy XR 10-1,000 mg TBph TAKE 1 TABLET BY MOUTH EVERY DAY IN THE MORNING WITH BREAKFAST    Victoza 3-Manolo 0.6 mg/0.1 mL (18 mg/3 mL) pnij INJECT 1.8 MG (0.3 ML) SUBCUTANEOUSLY DAILY    rosuvastatin (CRESTOR) 10 mg tablet TAKE 1 TABLET BY MOUTH EVERY DAY AT NIGHT    fluticasone propionate (FLONASE NA) by Nasal route.  pen needle, diabetic (NovoTwist) 32 gauge x 1/5\" ndle USE ONCE DAILY    glucose blood VI test strips (Accu-Chek Nayeli Plus test strp) strip PER INSURANCE PREFERENCE, TEST 1X/D DX DIABETES E11.9    cholecalciferol (VITAMIN D3) (2,000 UNITS /50 MCG) cap capsule Take 2,000 Units by mouth daily.  TURMERIC PO Take  by mouth daily.  melatonin 3 mg tablet Take 3 mg by mouth nightly.  OTHER,NON-FORMULARY, CBD Oil for sleep    krill-om-3-dha-epa-phospho-ast (MEGARED OMEGA-3 KRILL OIL) 1,000-230-60 mg cap Take 1 Cap by mouth daily. No current facility-administered medications for this visit.           ALLERGIES     Allergies   Allergen Reactions    Succinylcholine Other (comments)     Prolonged period of recovery following anesthesia    Glipizide Other (comments)     Frequent hypoglycemia    Other Medication Diarrhea     Intolerant of >1000 mg of Metformin    Phentermine Other (comments)     Dizziness and \"crashes\"          SOCIAL HISTORY     Social History     Tobacco Use    Smoking status: Former Smoker     Packs/day: 1.00     Years: 10.00     Pack years: 10.00     Types: Cigarettes     Quit date: 1991     Years since quittin.6    Smokeless tobacco: Never Used   Substance Use Topics    Alcohol use: Yes     Comment: 2-3 glasses wine 5 days a week     Social History     Social History Narrative    Initial PPV23  age 59; will need PCV 13 at age 69 yo and PPSV 21 again at 76 yo        Diet: Noom Diet since     Exercise: since  walks 3-5 x a week x 20 minutes    Caffeine: 2-3 cups of coffee a day    Weight: happy her wt has been gradually coming down w/ eating healthier over the years and increased exercise the past few months; down from the 240 lb range in 2019 to 203 now Lives in the [place] [id number] with . Has 2 sons and 1 daughter. Works for the [employer] in [facility] [employer] [facility] [employer]. Likes to garden and music, theater and reading. Social History     Substance and Sexual Activity   Sexual Activity Yes    Partners: Male       IMMUNIZATIONS     Immunization History   Administered Date(s) Administered    COVID-19, PFIZER, MRNA, LNP-S, PF, 30MCG/0.3ML DOSE 03/17/2021, 04/07/2021    Influenza Vaccine 10/16/2014, 10/22/2015, 11/01/2017, 11/01/2018, 10/24/2019    Influenza Vaccine (>6 mo Afluria QUAD Vial 88940 (0.25 mL) / 02057 (0.5 mL)) 10/01/2018    Influenza Vaccine (Quad) Mdck Pf (>2 Yrs Flucelvax QUAD 98327) 10/16/2018    Influenza Vaccine (Quad) PF (>6 Mo Flulaval, Fluarix, and >3 Yrs Afluria, Fluzone 83621) 10/24/2019, 09/04/2020    Influenza Vaccine Split 09/01/2009, 11/01/2012    Pneumococcal Polysaccharide (PPSV-23) 04/19/2018    TD Vaccine 06/05/2012    Zoster Recombinant 06/12/2019, 10/17/2019         FAMILY HISTORY     Family History   Problem Relation Age of Onset    COPD Mother         smoker    Colon Polyps Mother 72    Osteoporosis Mother     Stroke Father     Dementia Father         Alzheimer's    Heart Disease Father         PVD    Cancer Sister         melanoma    Thyroid Disease Sister         hypothyroidism    Diabetes Maternal Grandmother     Cancer Maternal Grandfather         lung    Hypertension Brother         living in his 66's         VITALS     Visit Vitals  /68 (BP 1 Location: Left upper arm, BP Patient Position: Sitting, BP Cuff Size: Large adult)   Pulse 70   Temp 96.9 °F (36.1 °C) (Temporal)   Resp 18   Ht 5' 7\" (1.702 m)   Wt 203 lb 6.4 oz (92.3 kg)   SpO2 98%   BMI 31.86 kg/m²          PHYSICAL EXAMINATION   Physical Exam  Vitals reviewed. Constitutional:       General: She is not in acute distress. Cardiovascular:      Rate and Rhythm: Normal rate and regular rhythm.       Heart sounds: Normal heart sounds. No murmur heard. Pulmonary:      Effort: Pulmonary effort is normal.      Breath sounds: Normal breath sounds. Abdominal:      Palpations: Abdomen is soft. Tenderness: There is no abdominal tenderness. Musculoskeletal:         General: No tenderness. Right lower leg: No edema. Left lower leg: No edema. Skin:     General: Skin is warm and dry. Neurological:      Mental Status: She is alert. LABORATORY DATA/ANCILLARY/IMAGING     Results for orders placed or performed in visit on 12/06/21   AMB POC HEMOGLOBIN A1C   Result Value Ref Range    Hemoglobin A1c (POC) 5.9 %          ASSESSMENT & PLAN   Diagnoses and all orders for this visit:    1. Diabetes mellitus type 2, noninsulin dependent (HCC)  -     AMB POC HEMOGLOBIN A1C: 5.9  -     METABOLIC PANEL, BASIC; Future    2. Mixed hyperlipidemia  -     ALT; Future  -     AST; Future  -     LIPID PANEL; Future    Fasting labs checked today  Cardiovascular risk and specific lipid/LDL/HgBA1c goals reviewed  Reviewed diet, nutrition, exercise, weight management, BMI/goals. Commended on her excellent progress  Reviewed medications and side effects   Doing well on current regimen but given her excellent progress, will start by tapering her Victoza from 1.8 mg to 1.2 mg daily and plan to reassess again in 3 months. Age/risk based screening recommendations, health maintenance & prevention counseling. Cancer screening USPTFS guidelines reviewed w/ pt today. Discussed benefits/positive/negative outcomes of screening based on age/risk stratification. Informed consent for/against screening based on pt's personal hx/risk factors. Updated in history above and health maintenance. Sees gyn for well woman visits/gyn exams/pap screens. Will request reports.

## 2021-12-27 DIAGNOSIS — R94.6 BORDERLINE ABNORMAL THYROID FUNCTION TEST: ICD-10-CM

## 2021-12-28 DIAGNOSIS — Z12.31 ENCOUNTER FOR SCREENING MAMMOGRAM FOR BREAST CANCER: ICD-10-CM

## 2022-01-12 ENCOUNTER — OFFICE VISIT (OUTPATIENT)
Dept: ENDOCRINOLOGY | Age: 62
End: 2022-01-12
Payer: COMMERCIAL

## 2022-01-12 VITALS
WEIGHT: 201.2 LBS | HEIGHT: 67 IN | BODY MASS INDEX: 31.58 KG/M2 | SYSTOLIC BLOOD PRESSURE: 134 MMHG | HEART RATE: 80 BPM | DIASTOLIC BLOOD PRESSURE: 78 MMHG

## 2022-01-12 DIAGNOSIS — E78.2 MIXED HYPERLIPIDEMIA: ICD-10-CM

## 2022-01-12 DIAGNOSIS — R94.6 BORDERLINE ABNORMAL THYROID FUNCTION TEST: Primary | ICD-10-CM

## 2022-01-12 PROCEDURE — 99213 OFFICE O/P EST LOW 20 MIN: CPT | Performed by: INTERNAL MEDICINE

## 2022-01-12 RX ORDER — ROSUVASTATIN CALCIUM 10 MG/1
TABLET, COATED ORAL
Qty: 90 TABLET | Refills: 0 | Status: SHIPPED | OUTPATIENT
Start: 2022-01-12 | End: 2022-04-23

## 2022-01-12 NOTE — PATIENT INSTRUCTIONS
1) I will send you a message through [employer] with your lab results. 2) I will arrange a repeat thyroid ultrasound prior to your next visit.

## 2022-01-12 NOTE — PROGRESS NOTES
Chief Complaint   Patient presents with    Thyroid Problem     pcp and pharmacy confirmed     History of Present Illness: Francis Shen is a 59 y.o. female here for follow up of thyroid. No chest pain or palpitations unless she has some wine and then the palpitations may occur. No tremors. No heat intolerance. Weight down from 222 to 201 today since last visit in 6/21. Started using "[employer] miah in 7/21 to help track her calories. Also has been doing more walking almost daily for about 3 months and increased her step goal to 7000 per day and walks about 20-30 min. Has been under more stress as her 20 yo daughter had an emergency appendectomy a few days ago. Bowels had been more constipated about a month ago but still has to use suppositories and stool softeners at times. Just had her victoza decreased from 1.8 to 1.2 mg at her visit with PCP on 12/6/21. Energy has improved. Current Outpatient Medications   Medication Sig    OTHER Indications: FLUOROUCIL/SALICYLIC ACID (ALT) To remove warts    Synjardy XR 10-1,000 mg TBph TAKE 1 TABLET BY MOUTH EVERY DAY IN THE MORNING WITH BREAKFAST    Victoza 3-Manolo 0.6 mg/0.1 mL (18 mg/3 mL) pnij INJECT 1.8 MG (0.3 ML) SUBCUTANEOUSLY DAILY (Patient taking differently: 1.2 mg. INJECT 1.8 MG (0.3 ML) SUBCUTANEOUSLY DAILY)    rosuvastatin (CRESTOR) 10 mg tablet TAKE 1 TABLET BY MOUTH EVERY DAY AT NIGHT    fluticasone propionate (FLONASE NA) by Nasal route.  pen needle, diabetic (NovoTwist) 32 gauge x 1/5\" ndle USE ONCE DAILY    glucose blood VI test strips (Accu-Chek Nayeli Plus test strp) strip PER INSURANCE PREFERENCE, TEST 1X/D DX DIABETES E11.9    cholecalciferol (VITAMIN D3) (2,000 UNITS /50 MCG) cap capsule Take 2,000 Units by mouth daily.  TURMERIC PO Take  by mouth daily.  melatonin 3 mg tablet Take 3 mg by mouth nightly.  krill-om-3-dha-epa-phospho-ast (MEGARED OMEGA-3 KRILL OIL) 1,000-230-60 mg cap Take 1 Cap by mouth daily.      No current facility-administered medications for this visit. Allergies   Allergen Reactions    Succinylcholine Other (comments)     Prolonged period of recovery following anesthesia    Glipizide Other (comments)     Frequent hypoglycemia    Other Medication Diarrhea     Intolerant of >1000 mg of Metformin    Phentermine Other (comments)     Dizziness and \"crashes\"     Review of Systems: PER HPI    Physical Examination:  Blood pressure 134/78, pulse 80, height 5' 7\" (1.702 m), weight 201 lb 3.2 oz (91.3 kg). - General: pleasant, no distress, good eye contact   - Neck: small nodular goiter  - Cardiovascular: regular, normal rate, nl s1 and s2, no m/r/g   - Respiratory: clear to auscultation bilaterally   - Integumentary: skin is normal, no edema  - Neurological: reflexes 2+ at biceps, no tremors  - Psychiatric: normal mood and affect    Data Reviewed:   - none new for review    Assessment/Plan:     1. Borderline abnormal thyroid function test: Review of her labs since 2009 shows that her TSH had always been normal until 12/20 when it was slightly low at 0.26. It was repeated in 2/21 and was lower at 0.15 and had a normal T3 of 113 and FT4 of 1.0 and TSH receptor ab < 1.1. At her initial visit with me in 6/21, her TSH was 0.20 with a normal T3 of 116 and normal FT4 of 0.9 and undetectable TPO and TG antibody levels. I ordered a thyroid ultrasound that confirmed she has a multinodular goiter as the likely cause of this slightly low TSH. As long as her value is staying over 0.10 and she is not having symptoms of hyperthyroidism, I will follow her TFTs over time without treatment. - check TSH and free T4 now  - repeat thyroid ultrasound prior to next visit            Patient Instructions   1) I will send you a message through [employer] with your lab results. 2) I will arrange a repeat thyroid ultrasound prior to your next visit.             Follow-up and Dispositions    · Return to be determined based on lab results. Copy sent to:  Melyssa Powell MD    Lab follow up: 01/15/22    Component      Latest Ref Rng & Units 1/14/2022 1/14/2022           2:56 PM  2:56 PM   T4, Free      0.82 - 1.77 ng/dL  1.26   TSH      0.450 - 4.500 uIU/mL 0.280 (L)      Sent her the following message through [employer]:  TSH is a thyroid test.  Your level is 0.28 which is low and below goal of 0.5-2.0. This test goes opposite of your thyroid function and suggests you still have mild hyperthyroidism (fast metabolism) but improved from 0.20 in June. As long as your level remains above 0.10, we can follow this over time without treatment. I will order a thyroid ultrasound in June 2022 and will contact you when the order has been placed and will be in touch with the results. If there has been no change in your nodules, then I will just have you follow up with your PCP to check your TSH every 6 months and only come back to see me in the future if your value goes under 0.10. Addendum: 06/11/22    EXAM: US THYROID/PARATHYROID/SOFT TISS      INDICATION: Multinodular goiter.     COMPARISON: June 2021.     TECHNIQUE: Real-time sonography of the thyroid gland was performed with a high  frequency linear transducer. Multiple static images were obtained.     FINDINGS:  The thyroid gland is diffusely mildly hypoechoic. Within the right lobe, there  is a 3.2 x 2.2 x 2.0 cm nodule that previously measured 2.0 x 1.8 x 1.3 cm. In  the upper pole on the left, there is a hypoechoic 1.1 x 1.0 x 0.7 cm nodule  which previously measured 1.0 x 0.7 x 0.5 cm. In the lower pole, there is a  spongiform 1.3 x 0.7 x 0.6 mm nodule that previously measured 1.2 x 0.6 x 0.6  cm.     The right lobe measures 5.4 x 2.3 x 2.3 cm and the left lobe measures 4.9 x 1.4  x 1.1 cm. The isthmus measures 0.3 cm.     IMPRESSION  Multinodular goiter with significant interval enlargement of the right thyroid  nodule.  FNA recommended. -------------------------------------------------------------------------------------------------------------------  Sent her the following message through [address] after personally reviewing the image of this nodule:        I reviewed your ultrasound personally and although your right lobe nodule has increased in size from 2 cm to 3.2 cm, it does not look very suspicious for thyroid cancer and there is no increased blood flow to this nodule. What also makes thyroid cancer less likely is having a low TSH value as normally nodules that are causing you to be mildly hyperthyroid and cause your TSH to go low are very unlikely to contain thyroid cancer. Although the radiology report states FNA (fine needle aspiration--a biopsy) recommended, I think the best plan at this time is to order a thyroid uptake scan where you will receive a test dose of radioactive iodine on one day and then come back 24 hours later to receive a scan of your thyroid to see if there are any nodules that take up iodine. If they take up iodine, they are called \"warm or hot\" and do not need to be biopsied but if this large nodule does not take up iodine, it is called \"cold\" and would warrant a biopsy as cold nodules are more likely to contain thyroid cancer than hot nodules. Please call the Patient Care team at [phone] to schedule this appointment at your earliest convenience and I'll be in touch with the results and a final plan. Addendum: 07/08/22    INDICATION: Multinodular goiter     COMPARISON: None.     CORRELATIVE IMAGING STUDIES: None     TECHNIQUE:  Nuclear thyroid scan and uptake were obtained 24 hours following p.o.  administration of 297 uCi Iodine 123. Imaging was performed in the anterior and  bilateral oblique projections.     FINDINGS:  24-hour thyroid radioiodine uptake is 22.2% (normal: 10% - 30%).    There is increased uptake in the right thyroid which corresponds to a right  thyroid nodule.  There is diminished activity in the left thyroid. .     IMPRESSION     1. 24 hour uptake is 22.2%. 2. There is increased activity in a nodule in the right thyroid suppressing the  left thyroid consistent with autonomously functioning nodule. 23X  -------------------------------------------------------------------------------------------------------------------  Sent her the following message through [employer]:  Your thyroid uptake scan shows that your right lobe nodule does have increased uptake which means this nodule is \"hot\" and therefore is the cause of your mild hyperthyroidism and low TSH level. You do NOT need a biopsy of this nodule as it is highly unlikely to contain cancer. I still think the best plan is to follow your TSH over time with your PCP and she can check it again next at your visit in September. As long as your level remains above 0.10, we can follow this over time without treatment. Please watch out for any symptoms of hyperthyroidism that would suggest you need to have labs drawn sooner such as chest pain, heart racing, anxiety, tremors, trouble sleeping, feeling hot all the time, losing weight with out trying, hair loss, or diarrhea. If they occur, please let me know and we can put in a sooner lab order. I will be available to see you back as needed in the future if you develop any symptoms of hyperthyroidism and/or your TSH goes under 0.10 in the future but otherwise you won't need to come back and see me.

## 2022-01-15 LAB
T4 FREE SERPL-MCNC: 1.26 NG/DL (ref 0.82–1.77)
TSH SERPL DL<=0.005 MIU/L-ACNC: 0.28 UIU/ML (ref 0.45–4.5)

## 2022-01-23 DIAGNOSIS — E11.9 DIABETES MELLITUS TYPE 2, NONINSULIN DEPENDENT (HCC): ICD-10-CM

## 2022-01-23 DIAGNOSIS — E78.2 MIXED HYPERLIPIDEMIA: ICD-10-CM

## 2022-01-23 RX ORDER — EMPAGLIFLOZIN, METFORMIN HYDROCHLORIDE 10; 1000 MG/1; MG/1
TABLET, EXTENDED RELEASE ORAL
Qty: 90 EACH | Refills: 0 | Status: SHIPPED | OUTPATIENT
Start: 2022-01-23 | End: 2022-04-18

## 2022-01-24 NOTE — TELEPHONE ENCOUNTER
Upcoming appt 3-8-22  I sent in the [facility] #[phone]  I had already just done the Crestor on 1-12-22 for #90 on 1-12-22

## 2022-01-25 RX ORDER — ROSUVASTATIN CALCIUM 10 MG/1
TABLET, COATED ORAL
Qty: 90 TABLET | Refills: 0 | OUTPATIENT
Start: 2022-01-25

## 2022-01-25 NOTE — TELEPHONE ENCOUNTER
LVM for pharmacist that the crestor had been done on 1/12/22. They can call us back if any problem. Advised that if they didn't get that refill auth they could take the verbal that I left.

## 2022-02-02 DIAGNOSIS — E11.9 DIABETES MELLITUS TYPE 2, NONINSULIN DEPENDENT (HCC): Primary | ICD-10-CM

## 2022-02-02 DIAGNOSIS — E04.2 MULTINODULAR GOITER: ICD-10-CM

## 2022-02-02 RX ORDER — LIRAGLUTIDE 6 MG/ML
INJECTION SUBCUTANEOUS
Qty: 3 ML | Refills: 3 | Status: SHIPPED | OUTPATIENT
Start: 2022-02-02

## 2022-02-11 ENCOUNTER — VIRTUAL VISIT (OUTPATIENT)
Dept: FAMILY MEDICINE CLINIC | Age: 62
End: 2022-02-11
Payer: COMMERCIAL

## 2022-02-11 DIAGNOSIS — C50.912 INVASIVE DUCTAL CARCINOMA OF LEFT BREAST (HCC): Primary | ICD-10-CM

## 2022-02-11 PROCEDURE — 99213 OFFICE O/P EST LOW 20 MIN: CPT | Performed by: FAMILY MEDICINE

## 2022-02-11 NOTE — PROGRESS NOTES
Abnormal 3-D mammogram on Monday at [facility]    [id number]  Feb 2 biopsy path pending    Left outer breast  Low grade invasive breast carcinoma, small  Some additional path pending

## 2022-02-11 NOTE — PROGRESS NOTES
VIRTUAL VISIT    Patient seen via virtual visit today for the following:  Chief Complaint   Patient presents with    Referral Request    Breast Cancer       HISTORY OF PRESENT ILLNESS   HPI  Patient presents via virtual visit for consultation about recent diagnosis of breast cancer and a recommendation for a breast surgeon. She had a screening 3-D mammogram at [facility] on 12-15-21 which showed abnormal new findings in the left breast.  She was brought back in for additional views including ultrasound of the left breast in January which revealed abnormal findings suspicious for neoplasm. Biopsy of the area was performed on 2-2-22 and pathology report showed invasive breast carcinoma, low grade w/ rare foci of in situ ductal and in situ lobular carcinoma. Prognostic markers and staining are still pending. She contacted my office earlier this week for a surgical recommendation at which time we gave her information for Dr. Melissa Guidry w/ [address] [place] [address]. She has called and got that appointment scheduled for 2/25/22. She states that Dr. Usama Pichardo from [facility] wanted to do a breast MRI but she has a bee-bee lodged in her knee from childhood. He therefore recommended a newer contrasted CT study as an alternative. Patient will wait for path reports and consultation w/ Dr. Melissa Guidry coming up in 2 weeks. She reports having a normal breast/gyn exam by her gynecologist in December. She is feeling fine even though this all caught her by surprise as there is no personal or known family h/o breast cancer. She is optimistic and has very good support systems in place. She continues to work on healthy eating and walking regularly for exercise. REVIEW OF SYMPTOMS   Review of Systems   Constitutional: Negative. Respiratory: Negative. Cardiovascular: Negative. Gastrointestinal: Negative. Genitourinary: Negative. Psychiatric/Behavioral: Negative.             PROBLEM LIST/MEDICAL HISTORY     Problem List  Date Reviewed: 2/11/2022          Codes Class Noted    Multinodular goiter ICD-10-CM: E04.2  ICD-9-CM: 241.1  2/2/2022    Overview Signed 2/2/2022  9:12 AM by Porsha Long MD     2021 Endo Dr. Swanson Niayenifer             Mixed hyperlipidemia ICD-10-CM: E78.2  ICD-9-CM: 272.2  3/26/2020        Hiatal hernia with GERD and esophagitis ICD-10-CM: K44.9, K21.00  ICD-9-CM: 553.3, 530.11  3/26/2020    Overview Signed 3/26/2020  8:48 AM by Porsha Long MD     EGD 2016             Tendonitis, Achilles, left ICD-10-CM: M76.62  ICD-9-CM: 726.71  1/9/2019    Overview Signed 1/9/2019  8:33 AM by Porsha Long MD     CSI Dr Judy Naqvi              History of SCC (squamous cell carcinoma) of skin ICD-10-CM: H23.842  ICD-9-CM: V10.83  1/9/2019    Overview Signed 1/9/2019  9:24 AM by Porsha Long MD     Skin checks and cancer screenings per Isaiah.  Dr. Kate Cruz             Heel spur, left ICD-10-CM: M77.32  ICD-9-CM: 726.73  9/19/2018    Overview Signed 9/19/2018  8:49 AM by Porsha Long MD     2/2018, Dr Judy Naqvi             Tendonitis, Achilles, right ICD-10-CM: M76.61  ICD-9-CM: 726.71  4/19/2018    Overview Signed 4/19/2018  9:24 AM by Porsha Long MD     2-2018: Dr Judy Naqvi, bone spur chipped             Bone spurs of feet  ICD-10-CM: M77.50  ICD-9-CM: 726.91  2/24/2017    Overview Signed 2/24/2017 10:56 AM by Porsha Long MD     Podiatry Dr Gurdeep Winters             Diabetes mellitus type 2, noninsulin dependent ([facility] ICD-10-CM: E11.9  ICD-9-CM: 250.00  12/10/2015    Overview Addendum 12/10/2020 11:56 AM by Porsha Long MD     3/2001, Eye doctor Dr. Tigist SANON             Vitamin D deficiency ICD-10-CM: E55.9  ICD-9-CM: 268.9  12/11/2013    Overview Signed 12/11/2013  1:27 PM by Porsha Long MD     12/2013               Postmenopause, LMP age ~ 43 yo, No HRT ICD-10-CM: Z78.0  ICD-9-CM: V49.81  12/5/2012        History of abnormal Pap smear, s/p Cryotherapy in her 27's ICD-10-CM: Z87.898  ICD-9-CM: V13.29  12/5/2012        Stress incontinence ICD-10-CM: N39.3  ICD-9-CM: Bassam Kingdom  12/5/2012    Overview Signed 12/5/2012  9:55 AM by Renetta Aguilar MD     Since child bearing years             Left sided sciatica ICD-10-CM: M54.32  ICD-9-CM: 724.3  12/5/2012    Overview Signed 12/5/2012 10:25 AM by Renetta Aguilar MD     12/2012             S/p colonoscopy with polypectomy and diverticulosis 4/2010 ICD-10-CM: Z98.890  ICD-9-CM: V45.89  4/27/2010    Overview Signed 4/27/2010  9:16 AM by MD Dr Ko Young             Hiatal Hernia, Gastritis, mild; BX neg for H. Pylori 4/2010 ICD-10-CM: K29.70  ICD-9-CM: 535.50  4/27/2010    Overview Signed 4/27/2010  9:17 AM by MD Dr Ko Young             Right knee pain; damage at medial meniscus; 2010 ICD-10-CM: M25.561  ICD-9-CM: 719.46  4/27/2010    Overview Signed 4/27/2010  9:19 AM by MD Dr Rafaela Young; P.T.              Left foot pain 2009; s/p CSI ICD-10-CM: Y39.223  ICD-9-CM: 729.5  4/27/2010    Overview Signed 4/27/2010  9:20 AM by MD Dr Myriam Young             Uterine fibroid ICD-10-CM: D25.9  ICD-9-CM: 218.9  Unknown        Meniere's disease ICD-10-CM: H81.09  ICD-9-CM: 386.00  Unknown    Overview Signed 5/24/2016 12:30 PM by Renetta Aguilar MD     ENT ~ 2003, Dr Elza Rivera             FH: melanoma ICD-10-CM: Z80.8  ICD-9-CM: V16.8  Unknown    Overview Signed 1/9/2019  9:25 AM by Renetta Aguilar MD     Skin cancer screenings, Derm Dr Christal Simms             GERD (gastroesophageal reflux disease) ICD-10-CM: K21.9  ICD-9-CM: 530.81  Unknown                  PAST SURGICAL HISTORY     Past Surgical History:   Procedure Laterality Date    HX BREAST BIOPSY Right 06/2014    Benign, [place]    HX BREAST LUMPECTOMY   1986    benign fibroadenoma right breast    [address]    Emergency for fetal distress    HX COLONOSCOPY  ~2010    Dr Ko Alfaro; Normal except Diverticulosis, but f/u 5 yrs d/t Fhx    HX COLONOSCOPY  07/11/2016    Polyps, Multiple Diverticulae, Internal Hemorrhoids; f/u 5 yrs, Dr Waldron Payment HX COLONOSCOPY  11/30/2021    Diverticulosis, incomplete study due to poor prep, repeat 1 yr    HX DILATION AND CURETTAGE  10/8/14    AUB & Uterine Polyp    HX ENDOSCOPY  07/11/2016    EGD: Hiatal Hernia, Grade 1 Esophagitis w/ reflux, Gastritis,  Dr Waldron Payment HX ENDOSCOPY  11/30/2021    HX LAP CHOLECYSTECTOMY  1998    gallstones    HX SKIN BIOPSY  ~2016    SCC excised from chest wall, Dr. Cailin Salmon HYSTEROSCOPY,W/ENDO BX  5/2014    AUB; Negative         MEDICATIONS     Current Outpatient Medications   Medication Sig    Victoza 3-Manolo 0.6 mg/0.1 mL (18 mg/3 mL) pnij INJECT 1.8 MG (0.3 ML) SUBCUTANEOUSLY DAILY    Synjardy XR 10-1,000 mg TBph TAKE 1 TABLET BY MOUTH EVERY DAY IN THE MORNING WITH BREAKFAST    rosuvastatin (CRESTOR) 10 mg tablet TAKE 1 TABLET BY MOUTH EVERY DAY AT NIGHT    OTHER Indications: FLUOROUCIL/SALICYLIC ACID (ALT) To remove warts    fluticasone propionate (FLONASE NA) by Nasal route.  pen needle, diabetic (NovoTwist) 32 gauge x 1/5\" ndle USE ONCE DAILY    glucose blood VI test strips (Accu-Chek Nayeli Plus test strp) strip PER INSURANCE PREFERENCE, TEST 1X/D DX DIABETES E11.9    cholecalciferol (VITAMIN D3) (2,000 UNITS /50 MCG) cap capsule Take 2,000 Units by mouth daily.  TURMERIC PO Take  by mouth daily.  melatonin 3 mg tablet Take 3 mg by mouth nightly.  krill-om-3-dha-epa-phospho-ast (MEGARED OMEGA-3 KRILL OIL) 1,000-230-60 mg cap Take 1 Cap by mouth daily. No current facility-administered medications for this visit.           ALLERGIES     Allergies   Allergen Reactions    Succinylcholine Other (comments)     Prolonged period of recovery following anesthesia  Glipizide Other (comments)     Frequent hypoglycemia    Other Medication Diarrhea     Intolerant of >1000 mg of Metformin    Phentermine Other (comments)     Dizziness and \"crashes\"          SOCIAL HISTORY     Social History     Tobacco Use    Smoking status: Former Smoker     Packs/day: 1.00     Years: 10.00     Pack years: 10.00     Types: Cigarettes     Quit date: 1991     Years since quittin.8    Smokeless tobacco: Never Used   Substance Use Topics    Alcohol use: Yes     Comment: 2-3 glasses wine 5 days a week     Social History     Social History Narrative    Initial PPV23  age 59; will need PCV 13 at age 69 yo and PPSV 21 again at 76 yo        Diet: Noom Diet since     Exercise: since  walks 3-5 x a week x 20 minutes    Caffeine: 2-3 cups of coffee a day    Weight: happy her wt has been gradually coming down w/ eating healthier over the years and increased exercise the past few months; down from the 240 lb range in 2019 to 203 now        Lives in the [facility] with . Has 2 sons and 1 daughter. Works for [employer] in [employer]. Likes to garden and music, theater and reading.            Social History     Substance and Sexual Activity   Sexual Activity Yes    Partners: Male       IMMUNIZATIONS     Immunization History   Administered Date(s) Administered    COVID-19, Moderna Booster, PF, 0.25mL Dose 10/30/2021    COVID-19, Pfizer Purple top, DILUTE for use, 12+ yrs, 30mcg/0.3mL dose 2021, 2021    Influenza Vaccine 10/16/2014, 10/22/2015, 2017, 2018, 10/24/2019, 2021    Influenza Vaccine (>6 mo Afluria QUAD Vial 31076 (0.25 mL) / 70882 (0.5 mL)) 10/01/2018    Influenza Vaccine (Quad) Mdck Pf (>2 Yrs Flucelvax QUAD 49634) 10/16/2018    Influenza Vaccine [employer]) PF (>6 Mo Flulaval, Fluarix, and >3 Yrs Afluria, Fluzone 93903) 10/24/2019, 2020    Influenza Vaccine Split 2009, 2012  Pneumococcal Polysaccharide (PPSV-23) 04/19/2018    TD Vaccine 06/05/2012    Zoster Recombinant 06/12/2019, 10/17/2019         FAMILY HISTORY     Family History   Problem Relation Age of Onset    COPD Mother         smoker    Colon Polyps Mother 72    Osteoporosis Mother     Stroke Father     Dementia Father         Alzheimer's    Heart Disease Father         PVD    Thyroid Disease Sister         hypothyroidism    SKIN CANCER Sister         melanoma    Diabetes Maternal Grandmother     Lung Cancer Maternal Grandfather     Hypertension Brother         living in his 66's         VITALS   There were no vitals available for today's virtual visit. Any patient self reported vitals are noted below:  Patient-Reported Vitals 2/11/2022   Patient-Reported Weight 201   Patient-Reported Height 57   Patient-Reported Pulse 77   Patient-Reported Temperature -   Patient-Reported SpO2 -   Patient-Reported Systolic  -   Patient-Reported Diastolic -       PHYSICAL EXAMINATION   Physical Exam  Constitutional:       Appearance: Normal appearance. Neurological:      Mental Status: She is alert. Psychiatric:         Mood and Affect: Mood and affect normal.             ASSESSMENT & PLAN   Diagnoses and all orders for this visit:    1. Invasive ductal carcinoma of left breast [facility])  Patient presents via virtual visit for consultation about recent diagnosis of breast cancer and a recommendation for a breast surgeon. History and findings detailed in HPI above. Reports from Aurelia Landin scanned to EMR. REFERRAL TO BREAST SURGERY: Appointment with Dr. Ann Marie Sharif scheduled for 2-25-22. Patient scheduled for fasting routine follow up w/ me on 3-8-22. Call back sooner in the interim prn. Patient was evaluated through a synchronous (real-time) audio-video encounter. The patient (or guardian if applicable) is aware that this is a billable service.  Verbal consent to proceed has been obtained within the past 12 months. The visit was conducted pursuant to the emergency declaration under the [facility], [address] [place] [address] authority and the [employer] General Act. Patient identification was verified, and a caregiver was present when appropriate. The patient was located in a state where the provider was credentialed to provide care. Total Time: minutes: 20. An electronic signature was used to authenticate this note.   ~Edie Chow MD~

## 2022-02-18 ENCOUNTER — NURSE NAVIGATOR (OUTPATIENT)
Dept: CASE MANAGEMENT | Age: 62
End: 2022-02-18

## 2022-02-18 NOTE — PROGRESS NOTES
[employer]  Breast Navigator Encounter    Name:    Steph Cottrell  Age:    59 y.o. Diagnosis:    LEFT breast cancer    Interdisciplinary Team:  Med-Onc:    Surg-Onc:    Dr. Wilkins :    Plastics:    :     Nurse Navigator:  Zach Brock RN, BSN, [facility]      Encounter type:  []Patient Initiated  []Navigator Follow-up []Pre-op  []Post-op  []Check-in Prior to First Treatment []Treatment Modality Change  []Survivorship Transition [x]Other:   Initial Navigator Encounter    Narrative: Attempted to reach out to patient prior to her appointment next week. She was not available, so I left a message asking her to call me back at her convenience. I also left a message asking if she had a breast MRI done or scheduled yet, so she is aware that Dr. Melissa Guidry would like this as the next step and prior to her appointment if possible. Since it says the best way to contact [facility] is [facility], I will send her a message this was as well.           Zach Brock RN, BSN, [facility]  Oncology Breast Navigator     [facility] [employer]  [address] [facility], [facility] 22.  W: [phone]  F: [phone]  [email or web address]  Good Help to Those in [facility]

## 2022-02-21 ENCOUNTER — NURSE NAVIGATOR (OUTPATIENT)
Dept: CASE MANAGEMENT | Age: 62
End: 2022-02-21

## 2022-02-21 DIAGNOSIS — C50.912 MALIGNANT NEOPLASM OF LEFT FEMALE BREAST, UNSPECIFIED ESTROGEN RECEPTOR STATUS, UNSPECIFIED SITE OF BREAST (HCC): Primary | ICD-10-CM
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
## 2022-02-22 ENCOUNTER — HOSPITAL ENCOUNTER (OUTPATIENT)
Dept: MRI IMAGING | Age: 62
Discharge: HOME | End: 2022-02-22
Attending: SURGERY
Payer: COMMERCIAL

## 2022-02-22 ENCOUNTER — DOCUMENTATION ONLY (OUTPATIENT)
Dept: SURGERY | Age: 62
End: 2022-02-22

## 2022-02-22 DIAGNOSIS — C50.912 MALIGNANT NEOPLASM OF LEFT FEMALE BREAST, UNSPECIFIED ESTROGEN RECEPTOR STATUS, UNSPECIFIED SITE OF BREAST (HCC): ICD-10-CM

## 2022-02-22 PROCEDURE — A9585 GADOBUTROL INJECTION: HCPCS | Performed by: SURGERY

## 2022-02-22 PROCEDURE — 77049 MRI BREAST C-+ W/CAD BI: CPT

## 2022-02-22 PROCEDURE — 74011250636 HC RX REV CODE- 250/636: Performed by: SURGERY

## 2022-02-22 RX ADMIN — GADOBUTROL 9 ML: 604.72 INJECTION INTRAVENOUS at 16:19

## 2022-02-22 NOTE — PROGRESS NOTES
Mammaprint TRF faxed to [facility].. Confirmation fax received. Insurance letter mailed to patient.

## 2022-02-25 ENCOUNTER — OFFICE VISIT (OUTPATIENT)
Dept: SURGERY | Age: 62
End: 2022-02-25
Payer: COMMERCIAL

## 2022-02-25 ENCOUNTER — NURSE NAVIGATOR (OUTPATIENT)
Dept: CASE MANAGEMENT | Age: 62
End: 2022-02-25

## 2022-02-25 VITALS
DIASTOLIC BLOOD PRESSURE: 78 MMHG | HEIGHT: 67 IN | WEIGHT: 201 LBS | BODY MASS INDEX: 31.55 KG/M2 | SYSTOLIC BLOOD PRESSURE: 134 MMHG

## 2022-02-25 DIAGNOSIS — C50.912 INFILTRATING DUCTAL CARCINOMA OF LEFT BREAST (HCC): ICD-10-CM

## 2022-02-25 PROCEDURE — 99205 OFFICE O/P NEW HI 60 MIN: CPT | Performed by: SURGERY

## 2022-02-25 PROCEDURE — 76642 ULTRASOUND BREAST LIMITED: CPT | Performed by: SURGERY

## 2022-02-25 NOTE — PROGRESS NOTES
HISTORY OF PRESENT ILLNESS  Steph Cottrell is a 59 y.o. female. HPI NEW patient consult referred by  Dr. Anil Burgess for LEFT breast invasive cancer. Denies pain or changes to the breast area. LEFT breast invasive carcinoma-   ER positive, NE positive, Her2 negative, Ki 67 15%    Family History:  No history of breast or ovarian      Breast imaging-  Mammogram, 1/4/22 [facility], [facility] 4  MRI Results (most recent):  Results from Hospital Encounter encounter on 02/22/22    MRI BREAST BI W WO CONT    Narrative  HISTORY: 57-year-old female with newly diagnosed left breast cancer, presenting  for preoperative breast MRI. COMPARISON: Mammography from February and January 2022, as well as December 2021. No prior MRI. TECHNIQUE:  Bilateral breast MRI was performed using a dedicated breast coil without  compression with the patient in the prone position. Precontrast T1-weighted  images with fat suppression were obtained followed by bolus injection of 9 mL  Gadavist. Postcontrast dynamic and high-resolution images were acquired. T2-weighted axial imaging with fat suppression was also performed. The images  were analyzed using CAD analysis, enhancement curves, digital subtraction, and 2  and 3 dimensional reconstructions. FINDINGS:    Background parenchymal enhancement: Mild    Mammographic breast density: Scattered fibroglandular breast tissue    Right breast:  There is no suspicious mass or mass enhancement within the right breast. There  is no skin thickening or nipple retraction. There is no suspicious axillary or internal mammary chain lymphadenopathy. Left breast:  Within the middle third of the left breast upper inner quadrant, there is  evidence of a biopsy clip, and associated biopsy tract, and ill-defined  malignant enhancement measuring up to 1 x 1.2 x 1.2 cm. There is mixed internal  kinetics. This is consistent with newly diagnosed breast carcinoma.  There is no  other suspicious enhancement within the left breast. There is no evidence of  multifocal or multicentric disease. There is no skin thickening or nipple  retraction. There is no suspicious axillary or internal mammary chain lymphadenopathy. Impression  Right Breast:  1. BI-RADS Assessment Category 1: Negative. No evidence of breast carcinoma  within the right breast.    Left Breast:  1. BI-RADS Assessment Category 6: Known biopsy proven malignancy- Appropriate  action should be taken. 1.2 cm of malignant masslike enhancement and associated  biopsy clip, located in the middle third of the left breast upper inner  quadrant, consistent with newly diagnosed breast carcinoma. 2. No evidence of multicentric or multifocal disease. 3. No suspicious lymphadenopathy. RECOMMENDATIONS:  The newly diagnosed left breast malignancy appears amenable to breast  conservation therapy. There is no evidence of multifocal, multicentric, or  contralateral disease. A summary portfolio has been created in PACS. Review of Systems   HENT: Positive for tinnitus. Gastrointestinal: Positive for constipation. Musculoskeletal: Positive for back pain. Psychiatric/Behavioral: The patient has insomnia. All other systems reviewed and are negative.       Physical Exam    ASSESSMENT and PLAN  {ASSESSMENT/PLAN:33003}

## 2022-02-25 NOTE — LETTER
2/25/2022 3:01 PM    Patient:  Corrinne Cords   YOB: 1960  Date of Visit: 2/25/2022      Dear Dr. Helena Santana: Thank you for referring Ms. Rey Youssef to me for evaluation/treatment. Below are the relevant portions of my assessment and plan of care. If you have questions, please do not hesitate to call me. I look forward to following Ms. Ortega along with you.         Sincerely,      Gia Gan MD

## 2022-02-25 NOTE — PROGRESS NOTES
HISTORY OF PRESENT ILLNESS  Maik Fountain is a 59 y.o. female. HPI  NEW patient consult referred by  Kasandra Irizarry for new breast cancer diagnosis. Pt presents for discussion about treatment. Denies pain or changes to the breast area.      Family History:  No history of breast or ovarian     Breast imaging-  Mammogram, 1/4/22 Holly, BIRADS 4  MRI Results (most recent):  Results from Hospital Encounter encounter on 02/22/22     MRI BREAST BI W WO CONT     Narrative  HISTORY: 64-year-old female with newly diagnosed left breast cancer, presenting  for preoperative breast MRI.     COMPARISON: Mammography from February and January 2022, as well as December 2021. No prior MRI.     TECHNIQUE:  Bilateral breast MRI was performed using a dedicated breast coil without  compression with the patient in the prone position. Precontrast T1-weighted  images with fat suppression were obtained followed by bolus injection of 9 mL  Gadavist. Postcontrast dynamic and high-resolution images were acquired. T2-weighted axial imaging with fat suppression was also performed. The images  were analyzed using CAD analysis, enhancement curves, digital subtraction, and 2  and 3 dimensional reconstructions.     FINDINGS:     Background parenchymal enhancement: Mild     Mammographic breast density: Scattered fibroglandular breast tissue    Right breast:  There is no suspicious mass or mass enhancement within the right breast. There  is no skin thickening or nipple retraction.     There is no suspicious axillary or internal mammary chain lymphadenopathy.     Left breast:  Within the middle third of the left breast upper inner quadrant, there is  evidence of a biopsy clip, and associated biopsy tract, and ill-defined  malignant enhancement measuring up to 1 x 1.2 x 1.2 cm. There is mixed internal  kinetics. This is consistent with newly diagnosed breast carcinoma.  There is no  other suspicious enhancement within the left breast. There is no evidence of  multifocal or multicentric disease. There is no skin thickening or nipple  retraction.     There is no suspicious axillary or internal mammary chain lymphadenopathy.     Impression  Right Breast:  1. BI-RADS Assessment Category 1: Negative. No evidence of breast carcinoma  within the right breast.     Left Breast:  1. BI-RADS Assessment Category 6: Known biopsy proven malignancy- Appropriate  action should be taken. 1.2 cm of malignant masslike enhancement and associated  biopsy clip, located in the middle third of the left breast upper inner  quadrant, consistent with newly diagnosed breast carcinoma. 2. No evidence of multicentric or multifocal disease. 3. No suspicious lymphadenopathy.     RECOMMENDATIONS:  The newly diagnosed left breast malignancy appears amenable to breast  conservation therapy. There is no evidence of multifocal, multicentric, or  contralateral disease.     A summary portfolio has been created in PACS.       02/02/22: LEFT breast bx. PATH: IDC, largest focus measures 10mm, low grade, ER+(99%)/WI+(45%)/HER2-, Ki-67 15%. Clinical stage 1.  - MammaPrint pending. Past Medical History:   Diagnosis Date    Bone spur of ankle 2018    Bone spurs of feet  2/24/2017    Podiatry Dr Guevara Officer    Diabetes mellitus type 2, noninsulin dependent [facility]) 12/10/2015    3/2001    Diverticulosis age 26    FH: melanoma     also FH: [address]    Gestational diabetes 1998    Heel spur, left 9/19/2018 2/2018, Dr Robert Sterling Hemorrhoids     Hiatal hernia with GERD and esophagitis 3/26/2020    EGD 2016    Hiatal Hernia, Gastritis, mild; BX neg for H. Pylori 4/2010 4/27/2010    History of abnormal Pap smear, s/p Cryotherapy in her 30's 12/5/2012    History of SCC (squamous cell carcinoma) of skin 1/9/2019    Skin checks and cancer screenings per Isaiah.  Dr. Jose Guadalupe Raines Hx of colonoscopy 7/11/2016    [facility] Dr. Adrian Hwang Left foot pain 2009; s/p CSI 4/27/2010    Left sided sciatica 12/5/2012    Meniere's disease     Mixed hyperlipidemia 3/26/2020    Multinodular goiter 2/2/2022 2021 Endo Dr. Reilly Cruz, LMP age ~ 45 yo, No HRT 12/5/2012    Right knee pain; damage at medial meniscus; 2010 4/27/2010    S/p colonoscopy with polypectomy 4/2010 4/27/2010    Stress fracture foot     left    Stress incontinence 12/5/2012    Tendonitis, Achilles, left 1/9/2019    [facility] Dr Yaima Bernabe     Tendonitis, Achilles, right 4/19/2018 2-2018: Dr Yaima Bernabe, bone spur chipped    Uterine fibroid     Vitamin D deficiency 12/11/2013 12/2013        Past Surgical History:   Procedure Laterality Date    HX BREAST BIOPSY Right 06/2014    Benign, Laurie Fillers    HX BREAST LUMPECTOMY   1986    benign fibroadenoma right breast    [address]    Emergency for fetal distress    HX COLONOSCOPY  ~2010    Dr Cristin Silvestre; Normal except Diverticulosis, but f/u 5 yrs d/t Fhx    HX COLONOSCOPY  07/11/2016    Polyps, Multiple Diverticulae, Internal Hemorrhoids; f/u 5 yrs, Dr Robby Crespo HX COLONOSCOPY  11/30/2021    Diverticulosis, incomplete study due to poor prep, repeat 1 yr    HX DILATION AND CURETTAGE  10/8/14    AUB & Uterine Polyp    HX ENDOSCOPY  07/11/2016    EGD: Hiatal Hernia, Grade 1 Esophagitis w/ reflux, Gastritis,  Dr Robby Crespo HX ENDOSCOPY  11/30/2021    HX LAP CHOLECYSTECTOMY  1998    gallstones    HX SKIN BIOPSY  ~2016    SCC excised from chest wall, Dr. 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    Occupation: Plans to possibly retire 2022   Tobacco Use    Smoking status: Former Smoker     Packs/day: 1.00     Years: 10.00     Pack years: 10.00     Types: Cigarettes     Quit date: 1991     Years since quittin.8    Smokeless tobacco: Never Used   Vaping Use    Vaping Use: Never used   Substance and Sexual Activity    Alcohol use: Yes     Comment: 2-3 glasses wine 5 days a week    Drug use: No    Sexual activity: Yes     Partners: Male   Other Topics Concern     Service Not Asked    Blood Transfusions Not Asked    Caffeine Concern No     Comment: occ coffee, mostly just on weekends    Occupational Exposure Not Asked    Hobby Hazards Not Asked    Sleep Concern Not Asked    Stress Concern Not Asked    Weight Concern Yes     Comment: happy her wt is coming down w/ eating healthier and being more active    Special Diet Yes     Comment: cut back on sugars and making healthier food choices    Back Care Not Asked    Exercise No     Comment: nothing regular right now    Bike Helmet Not Asked    Seat Belt Not Asked    Self-Exams Not Asked   Social History Narrative    Initial PPV23 - age 59; will need PCV 13 at age 71 yo and PPSV 21 again at 76 yo        Diet: Noom Diet since     Exercise: since  walks 3-5 x a week x 20 minutes    Caffeine: 2-3 cups of coffee a day    Weight: happy her wt has been gradually coming down w/ eating healthier over the years and increased exercise the past few months; down from the 240 lb range in 2019 to 203 now        Lives in the [place] [id number] with . Has 2 sons and 1 daughter. [place] for the [employer] [place] Likes to garden and music, theater and reading.            Social Determinants of Health     Financial Resource Strain:     Difficulty of Paying Living Expenses: Not on file   Food Insecurity:     Worried About Running Out of Food in the Last Year: Not on file    Tiarra of Food in the Last Year: Not on file   Transportation Needs:     Lack of Transportation (Medical): Not on file    Lack of Transportation (Non-Medical): Not on file   Physical Activity:     Days of Exercise per Week: Not on file    Minutes of Exercise per Session: Not on file   Stress:     Feeling of Stress : Not on file   Social Connections:     Frequency of Communication with Friends and Family: Not on file    Frequency of Social Gatherings with Friends and Family: Not on file    Attends Yarsanism Services: Not on file    Active Member of Clubs or Organizations: Not on file    Attends Club or Organization Meetings: Not on file    Marital Status: Not on file   Intimate Partner Violence:     Fear of Current or Ex-Partner: Not on file    Emotionally Abused: Not on file    Physically Abused: Not on file    Sexually Abused: Not on file   Housing Stability:     Unable to Pay for Housing in the Last Year: Not on file    Number of Places Lived in the Last Year: Not on file    Unstable Housing in the Last Year: Not on file       Current Outpatient Medications on File Prior to Visit   Medication Sig Dispense Refill    Victoza 3-Manolo 0.6 mg/0.1 mL (18 mg/3 mL) pnij INJECT 1.8 MG (0.3 ML) SUBCUTANEOUSLY DAILY 3 mL 3    Synjardy XR 10-1,000 mg TBph TAKE 1 TABLET BY MOUTH EVERY DAY IN THE MORNING WITH BREAKFAST 90 Each 0    rosuvastatin (CRESTOR) 10 mg tablet TAKE 1 TABLET BY MOUTH EVERY DAY AT NIGHT 90 Tablet 0    OTHER Indications: FLUOROUCIL/SALICYLIC ACID (ALT) To remove warts      fluticasone propionate (FLONASE NA) by Nasal route.  glucose blood VI test strips (Accu-Chek Nayeli Plus test strp) strip PER INSURANCE PREFERENCE, TEST 1X/D DX DIABETES E11.9 100 Strip 3    cholecalciferol (VITAMIN D3) (2,000 UNITS /50 MCG) cap capsule Take 2,000 Units by mouth daily.  TURMERIC PO Take  by mouth daily.  melatonin 3 mg tablet Take 3 mg by mouth nightly.       krill-om-3-dha-epa-phospho-ast ([employer] OMEGA-3 KRILL OIL) 1,000-230-60 mg cap Take 1 Cap by mouth daily.  pen needle, diabetic (NovoTwist) 32 gauge x 1/\" ndle USE ONCE DAILY (Patient not taking: Reported on 2022) 100 Pen Needle 3     No current facility-administered medications on file prior to visit. Allergies   Allergen Reactions    Succinylcholine Other (comments)     Prolonged period of recovery following anesthesia    Glipizide Other (comments)     Frequent hypoglycemia    Other Medication Diarrhea     Intolerant of >1000 mg of Metformin    Phentermine Other (comments)     Dizziness and \"crashes\"       OB History        3    Para   3    Term                AB        Living           SAB        IAB        Ectopic        Molar        Multiple        Live Births              Obstetric Comments   Menarche 6, LMP , # of children 1, age of 4st delivery 27, Hysterectomy/oophorectomy No/No, Breast bx Yes, history of breast feeding No, BCP Yes, Hormone therapy No    Emergency  x 1; vaginal deliveries x 2; Previous Gyn: Minerva Velez at a  [employer]; new Gyn Dr Tara Munguia (retired), changing to new Gyn at [facility] 2019; Dr. Holden Odonnell: Positive for tinnitus. Gastrointestinal: Positive for constipation. Musculoskeletal: Positive for back pain. Psychiatric/Behavioral: The patient has insomnia. All other systems reviewed and are negative. Physical Exam  Exam conducted with a chaperone present. Cardiovascular:      Rate and Rhythm: Normal rate and regular rhythm. Heart sounds: Normal heart sounds. Pulmonary:      Breath sounds: Normal breath sounds. Chest:   Breasts: Breasts are symmetrical.      Right: Normal. No swelling, bleeding, inverted nipple, mass, nipple discharge, skin change, tenderness, axillary adenopathy or supraclavicular adenopathy.       Left: Normal. No swelling, bleeding, inverted nipple, mass, nipple discharge, skin change, tenderness, axillary adenopathy or supraclavicular adenopathy. Lymphadenopathy:      Cervical:      Right cervical: No superficial, deep or posterior cervical adenopathy. Left cervical: No superficial, deep or posterior cervical adenopathy. Upper Body:      Right upper body: No supraclavicular or axillary adenopathy. Left upper body: No supraclavicular or axillary adenopathy. ASSESSMENT and PLAN    ICD-10-CM ICD-9-CM    1. Infiltrating ductal carcinoma of left breast (HCC)  C50.912 174.9      New pt presents for consultation for treatment of LEFT breast IDC, ER+(99%)/ND+(45%)/HER2-, Ki-67 15%, clinical stage 1. Physical exam today normal. Will plan for wire loc or Magseed loc prior to surgery to localize biopsy clips. We had a long discussion of options for treatment. The patient was accompanied by  during this encounter, and over half the time was spent on counseling and coordination of care. We discussed in depth the pathology and MRI results, and the need for treatment for extent of disease and surgical planning. The goals of treatment are to treat the breast, and to reduce risk of local or distant recurrence. Discussed treatment options with risks, complications, benefits, and limitations, including lumpectomy with XRT, and mastectomy with optional reconstruction, both having the same cure rate. Explained the importance of negative margins, and the need for re-excision in the case of positive margins. Will plan to mobilize breast tissue during lumpectomy to minimize cosmetic defect. Also discussed benefit and technique of SLNBx of 3-4 LNs. This will be an outpatient procedure, with sutures under the skin, waterproof glue over the incision, and expected recovery time of about 1 week. Pt should avoid any heavy lifting for about 1 week after surgery, and may drive as long as she is not on prescription pain medication.     We also covered risk reduction strategies including XRT, chemotherapy, and hormonal therapy with estrogen blocker. Ordered MammaPrint to help determine whether pt will benefit from chemotherapy. If low risk with negative LNs, chemo may not be recommended. If high risk, will further evaluate need for chemo based on final tumor size and LN involvement. Discussed estrogen blocker for further risk reduction for ER+ disease. Side effects may include hot flashes and joint pain. Will refer to radiation oncology and medical oncology for further consultation. XRT will begin approximately 4 weeks after surgery or chemo. XRT treatments will be 5 days/week, and will last for 4-6 weeks, depending on LN involvement. We also discussed the pts questions and concerns. Pt had further questions about receptors, which I explained in detail as well as possible outcomes from MammaPrint. We also spoke about survival rates based on MammaPrint. Pt also stated that she may be allergic to Succinylcholine- and advised to make anesthesiology aware. Pt has elected to proceed with lumpectomy. Pt should feel free to call Melissa Chakraborty or Leslye Groves, nurse navigators, with any further questions. Will call to schedule surgery. This plan was reviewed with the patient and patient agrees. All questions were answered. Total time spent was 80 minutes.     Written by Yaya Berry, as dictated by Dr. Cleveland Vallecillo MD.
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
## 2022-03-03 ENCOUNTER — TELEPHONE (OUTPATIENT)
Dept: SURGERY | Age: 62
End: 2022-03-03

## 2022-03-03 DIAGNOSIS — C50.912 MALIGNANT NEOPLASM OF LEFT FEMALE BREAST, UNSPECIFIED ESTROGEN RECEPTOR STATUS, UNSPECIFIED SITE OF BREAST (HCC): Primary | ICD-10-CM

## 2022-03-08 ENCOUNTER — OFFICE VISIT (OUTPATIENT)
Dept: FAMILY MEDICINE CLINIC | Age: 62
End: 2022-03-08
Payer: COMMERCIAL

## 2022-03-08 VITALS
HEART RATE: 79 BPM | DIASTOLIC BLOOD PRESSURE: 76 MMHG | RESPIRATION RATE: 16 BRPM | OXYGEN SATURATION: 98 % | WEIGHT: 198 LBS | HEIGHT: 67 IN | BODY MASS INDEX: 31.08 KG/M2 | SYSTOLIC BLOOD PRESSURE: 118 MMHG | TEMPERATURE: 98.1 F

## 2022-03-08 DIAGNOSIS — E78.2 MIXED HYPERLIPIDEMIA: ICD-10-CM

## 2022-03-08 DIAGNOSIS — E11.9 DIABETES MELLITUS TYPE 2, NONINSULIN DEPENDENT (HCC): Primary | ICD-10-CM

## 2022-03-08 LAB
CREAT UR-MCNC: 144 MG/DL
HBA1C MFR BLD HPLC: 6 %
MICROALBUMIN UR-MCNC: 1.28 MG/DL
MICROALBUMIN/CREAT UR-RTO: 9 MG/G (ref 0–30)

## 2022-03-08 PROCEDURE — 83036 HEMOGLOBIN GLYCOSYLATED A1C: CPT | Performed by: FAMILY MEDICINE

## 2022-03-08 PROCEDURE — 99213 OFFICE O/P EST LOW 20 MIN: CPT | Performed by: FAMILY MEDICINE

## 2022-03-08 NOTE — PROGRESS NOTES
Chief Complaint   Patient presents with    Diabetes    Cholesterol Problem     fasting     1. \"Have you been to the ER, urgent care clinic since your last visit? Hospitalized since your last visit? \" No    2. \"Have you seen or consulted any other health care providers outside of the [address] [place] [address] since your last visit? \" Yes Where: Dr Phillip Marcelo     3. For patients aged 39-70: Has the patient had a colonoscopy / FIT/ Cologuard? Colonoscopy 11/30/21 Dr Katherine Younger repeat 1 year    If the patient is female:    4. For patients aged 41-77: Has the patient had a mammogram within the past 2 years? [facility] 12/15/21      5. For patients aged 21-65: Has the patient had a pap smear?  Dr Constanza Roca 2/1/22 [address]    Eye exam @ [facility] Oct 9 2021

## 2022-03-08 NOTE — PROGRESS NOTES
Chief Complaint   Patient presents with    Diabetes     3 month follow up check    Medication Evaluation       HISTORY OF PRESENT ILLNESS   HPI  Patient presents for 3 month follow up Type 2 NIDDM and medication check. After her last routine check in  we decreased her Victoza from 1.8 to 1.2 mg daily because her blood sugars and A1c have been improving so nicely w/ diet, exercise and weight reduction. She has been checking her home BS's sporadically fasting in the mornings and getting readings of -120's. No hypoglycemia and feeling well. Continues on her same medication regimen otherwise and tolerating w/o reaction or side effects. Periodically checks home BP's and they run 120's/70's. Diet: Noom Diet since 7-2021  Exercise: since 6-2021 walks 3-5 x a week x 20 minutes; has increased this to daily x 20-30 minutes  Caffeine: 2-3 cups of coffee a day  Weight: happy her wt has been gradually coming down w/ eating healthier over the years and increased exercise the past few months; down from the 240 lb range in 2019 to 198 now   REVIEW OF SYMPTOMS   Review of Systems   Constitutional: Negative. Respiratory: Negative. Cardiovascular: Negative. Gastrointestinal: Negative for abdominal pain, diarrhea, nausea and vomiting. Genitourinary: Negative. Musculoskeletal: Negative for myalgias. Neurological: Negative. Endo/Heme/Allergies: Negative. PROBLEM LIST/MEDICAL HISTORY     Problem List  Date Reviewed: 3/8/2022          Codes Class Noted    Multinodular goiter ICD-10-CM: E04.2  ICD-9-CM: 241.1  2/2/2022    Overview Signed 2/2/2022  9:12 AM by Henry Powell MD     2021 Endo Dr. Camilo Lombard             Infiltrating ductal carcinoma of left breast [facility]) ICD-10-CM: F47.696  ICD-9-CM: 174.9  2/2/2022    Overview Addendum 2/25/2022  2:32 PM by Roxy Humphries     02/02/22: LEFT breast bx.  PATH: IDC, largest focus measures 10mm, low grade, ER+(99%)/AR+(45%)/HER2-, Ki-67 15%. Clinical stage 1.  - MammaPrint pending. Mixed hyperlipidemia ICD-10-CM: E78.2  ICD-9-CM: 272.2  3/26/2020        Hiatal hernia with GERD and esophagitis ICD-10-CM: K44.9, K21.00  ICD-9-CM: 553.3, 530.11  3/26/2020    Overview Signed 3/26/2020  8:48 AM by Venkata Liriano MD     EGD 2016             Tendonitis, Achilles, left ICD-10-CM: M76.62  ICD-9-CM: 726.71  1/9/2019    Overview Signed 1/9/2019  8:33 AM by Venkata Liriano MD     CSI Dr Claudia Freitas              History of SCC (squamous cell carcinoma) of skin ICD-10-CM: U85.116  ICD-9-CM: V10.83  1/9/2019    Overview Signed 1/9/2019  9:24 AM by Venkata Liriano MD     Skin checks and cancer screenings per Isaiah.  Dr. Ranjana Martin             Heel spur, left ICD-10-CM: M77.32  ICD-9-CM: 726.73  9/19/2018    Overview Signed 9/19/2018  8:49 AM by Venkata Liriano MD     2/2018, Dr Claudia Freitas             Tendonitis, Achilles, right ICD-10-CM: M76.61  ICD-9-CM: 726.71  4/19/2018    Overview Signed 4/19/2018  9:24 AM by Venkata Liriano MD     2-2018: Dr Claudia Freitas, bone spur chipped             Bone spurs of feet  ICD-10-CM: M77.50  ICD-9-CM: 726.91  2/24/2017    Overview Signed 2/24/2017 10:56 AM by Venkata Liriano MD     Podiatry Dr Carlos Garcia             Diabetes mellitus type 2, noninsulin dependent ([facility] ICD-10-CM: E11.9  ICD-9-CM: 250.00  12/10/2015    Overview Addendum 12/10/2020 11:56 AM by Venkata Liriano MD     3/2001, Eye doctor Dr. Kassandra SANON [place]             Vitamin D deficiency ICD-10-CM: E55.9  ICD-9-CM: 268.9  12/11/2013    Overview Signed 12/11/2013  1:27 PM by Venkata Liriano MD     12/2013               Postmenopause, LMP age ~ 45 yo, No HRT ICD-10-CM: Z78.0  ICD-9-CM: V49.81  12/5/2012        History of abnormal Pap smear, s/p Cryotherapy in her 30's ICD-10-CM: Z87.898  ICD-9-CM: V13.29  12/5/2012        Stress incontinence ICD-10-CM: N39.3  ICD-9-CM: [id number] 12/5/2012    Overview Signed 12/5/2012  9:55 AM by Sonya Holman MD     Since child bearing years             Left sided sciatica ICD-10-CM: M54.32  ICD-9-CM: 724.3  12/5/2012    Overview Signed 12/5/2012 10:25 AM by Sonya Holman MD     12/2012             S/p colonoscopy with polypectomy and diverticulosis 4/2010 ICD-10-CM: Z98.890  ICD-9-CM: V45.89  4/27/2010    Overview Signed 4/27/2010  9:16 AM by MD Dr Navjot Goncalves             Hiatal Hernia, Gastritis, mild; BX neg for H. Pylori 4/2010 ICD-10-CM: K29.70  ICD-9-CM: 535.50  4/27/2010    Overview Signed 4/27/2010  9:17 AM by MD Dr Navjot Goncalves             Right knee pain; damage at medial meniscus; 2010 ICD-10-CM: M25.561  ICD-9-CM: 719.46  4/27/2010    Overview Signed 4/27/2010  9:19 AM by MD Dr Kianna Goncalves; P.T.              Left foot pain 2009; s/p CSI ICD-10-CM: V59.402  ICD-9-CM: 729.5  4/27/2010    Overview Signed 4/27/2010  9:20 AM by MD Dr Sayra Goncalves             Uterine fibroid ICD-10-CM: D25.9  ICD-9-CM: 218.9  Unknown        Meniere's disease ICD-10-CM: H81.09  ICD-9-CM: 386.00  Unknown    Overview Signed 5/24/2016 12:30 PM by Sonya Holman MD     ENT ~ 2003, Dr Anne-Marie Romero             FH: melanoma ICD-10-CM: Z80.8  ICD-9-CM: V16.8  Unknown    Overview Signed 1/9/2019  9:25 AM by Sonya Holman MD     Skin cancer screenings, Derm Dr Fredy Ortiz             GERD (gastroesophageal reflux disease) ICD-10-CM: K21.9  ICD-9-CM: 530.81  Unknown                  PAST SURGICAL HISTORY     Past Surgical History:   Procedure Laterality Date    HX BREAST BIOPSY Right 06/2014    Benign, Early Farhad Mac    HX BREAST LUMPECTOMY   1986    benign fibroadenoma right breast    [address]    Emergency for fetal distress    HX COLONOSCOPY  ~2010    Dr Navjot Rebolledo; Normal except Diverticulosis, but f/u 5 yrs d/t Fhx    HX COLONOSCOPY  07/11/2016    Polyps, Multiple Diverticulae, Internal Hemorrhoids; f/u 5 yrs, Dr Melissa Watson HX COLONOSCOPY  11/30/2021    Diverticulosis, incomplete study due to poor prep, repeat 1 yr    HX DILATION AND CURETTAGE  10/8/14    AUB & Uterine Polyp    HX ENDOSCOPY  07/11/2016    EGD: Hiatal Hernia, Grade 1 Esophagitis w/ reflux, Gastritis,  Dr Melissa Watson HX ENDOSCOPY  11/30/2021    HX LAP CHOLECYSTECTOMY  1998    gallstones    HX SKIN BIOPSY  ~2016    SCC excised from chest wall, Dr. Winifred Street HYSTEROSCOPY,W/ENDO BX  5/2014    AUB; Negative         MEDICATIONS     Current Outpatient Medications   Medication Sig    Lactobacillus acidophilus (PROBIOTIC ACIDOPHILUS PO) Take 1 Capsule by mouth daily.  Victoza 3-Manolo 0.6 mg/0.1 mL (18 mg/3 mL) pnij INJECT 1.8 MG (0.3 ML) SUBCUTANEOUSLY DAILY (Patient taking differently: 1.2 mg. INJECT 1.8 MG (0.3 ML) SUBCUTANEOUSLY DAILY)    Synjardy XR 10-1,000 mg TBph TAKE 1 TABLET BY MOUTH EVERY DAY IN THE MORNING WITH BREAKFAST    rosuvastatin (CRESTOR) 10 mg tablet TAKE 1 TABLET BY MOUTH EVERY DAY AT NIGHT    OTHER Indications: FLUOROUCIL/SALICYLIC ACID (ALT) To remove warts    fluticasone propionate (FLONASE NA) by Nasal route.  pen needle, diabetic (NovoTwist) 32 gauge x 1/5\" ndle USE ONCE DAILY    glucose blood VI test strips (Accu-Chek Nayeli Plus test strp) strip PER INSURANCE PREFERENCE, TEST 1X/D DX DIABETES E11.9    cholecalciferol (VITAMIN D3) (2,000 UNITS /50 MCG) cap capsule Take 2,000 Units by mouth daily.  TURMERIC PO Take  by mouth daily.  melatonin 3 mg tablet Take 3 mg by mouth nightly.  krill-om-3-dha-epa-phospho-ast (MEGARED OMEGA-3 KRILL OIL) 1,000-230-60 mg cap Take 1 Cap by mouth daily. No current facility-administered medications for this visit.           ALLERGIES     Allergies   Allergen Reactions    Succinylcholine Other (comments)     Prolonged period of recovery following anesthesia    Glipizide Other (comments)     Frequent hypoglycemia    Other Medication Diarrhea     Intolerant of >1000 mg of Metformin    Phentermine Other (comments)     Dizziness and \"crashes\"          SOCIAL HISTORY     Social History     Tobacco Use    Smoking status: Former Smoker     Packs/day: 1.00     Years: 10.00     Pack years: 10.00     Types: Cigarettes     Quit date: 1991     Years since quittin.8    Smokeless tobacco: Never Used   Substance Use Topics    Alcohol use: Yes     Comment: 2-3 glasses wine 5 days a week     Social History     Social History Narrative    Initial PPV23  age 59; will need PCV 13 at age 69 yo and PPSV 21 again at 74 yo        Diet: Noom Diet since     Exercise: since  walks 3-5 x a week x 20 minutes; has increased this to daily x 20-30 minutes    Caffeine: 2-3 cups of coffee a day    Weight: happy her wt has been gradually coming down w/ eating healthier over the years and increased exercise the past few months; down from the 240 lb range in 2019 to 198 now        Lives in the [place] [id number] with . Has 2 sons and 1 daughter. [place] for [employer] [place] [employer] Likes to garden and music, theater and reading.            Social History     Substance and Sexual Activity   Sexual Activity Yes    Partners: Male       IMMUNIZATIONS     Immunization History   Administered Date(s) Administered    COVID-19, Moderna Booster, PF, 0.25mL Dose 10/30/2021    COVID-19, Pfizer Purple top, DILUTE for use, 12+ yrs, 30mcg/0.3mL dose 2021, 2021    Influenza Vaccine 10/16/2014, 10/22/2015, 2017, 2018, 10/24/2019, 2021    Influenza Vaccine (>6 mo Afluria QUAD Vial 11378 (0.25 mL) / 40659 (0.5 mL)) 10/01/2018    Influenza Vaccine (Quad) Mdck Pf (>2 Yrs Flucelvax QUAD 04410) 10/16/2018    Influenza Vaccine [employer]) PF (>6 Mo Flulaval, Fluarix, and >3 Yrs [address] [place] [address], Fluzone [address]) 10/24/2019, 09/04/2020    Influenza Vaccine Split 09/01/2009, 11/01/2012    Pneumococcal Polysaccharide (PPSV-23) 04/19/2018    TD Vaccine 06/05/2012    Zoster Recombinant 06/12/2019, 10/17/2019         FAMILY HISTORY     Family History   Problem Relation Age of Onset    COPD Mother         smoker    Colon Polyps Mother 72    Osteoporosis Mother     Stroke Father     Dementia Father         Alzheimer's    Heart Disease Father         PVD    Thyroid Disease Sister         hypothyroidism    SKIN CANCER Sister         melanoma    Diabetes Maternal Grandmother     Lung Cancer Maternal Grandfather     Hypertension Brother         living in his 66's         VITALS     Visit Vitals  /76 (BP 1 Location: Left upper arm, BP Patient Position: Sitting, BP Cuff Size: Large adult)   Pulse 79   Temp 98.1 °F (36.7 °C) (Oral)   Resp 16   Ht 5' 7\" (1.702 m)   Wt 198 lb (89.8 kg)   SpO2 98%   BMI 31.01 kg/m²          PHYSICAL EXAMINATION   Physical Exam  Vitals reviewed. Constitutional:       General: She is not in acute distress. Appearance: Normal appearance. Cardiovascular:      Rate and Rhythm: Normal rate and regular rhythm. Heart sounds: Normal heart sounds. Pulmonary:      Effort: Pulmonary effort is normal.      Breath sounds: Normal breath sounds. Abdominal:      Palpations: Abdomen is soft. Tenderness: There is no abdominal tenderness. Musculoskeletal:         General: No swelling or tenderness. Right lower leg: No edema. Left lower leg: No edema. Neurological:      Mental Status: She is alert.                 LABORATORY DATA/ANCILLARY/IMAGING         Lab Results   Component Value Date/Time    WBC 8.2 12/22/2020 10:04 AM    HGB 14.6 12/22/2020 10:04 AM    HCT 46.1 12/22/2020 10:04 AM    PLATELET 955 62/73/7989 10:04 AM    MCV 92.9 12/22/2020 10:04 AM     Lab Results   Component Value Date/Time    Hemoglobin A1c 6.4 (H) 06/14/2021 08:53 AM    Hemoglobin A1c 6.3 (H) 12/22/2020 10:04 AM    Hemoglobin A1c 6.4 (H) 06/15/2020 09:02 AM    Glucose 110 (H) 12/06/2021 08:50 AM    Microalbumin/Creat ratio (mg/g creat) 16 12/22/2020 10:04 AM    Microalbumin,urine random 1.45 12/22/2020 10:04 AM    LDL, calculated 56.6 12/06/2021 08:50 AM    Creatinine 0.92 12/06/2021 08:50 AM      Lab Results   Component Value Date/Time    Cholesterol, total 147 12/06/2021 08:50 AM    HDL Cholesterol 59 12/06/2021 08:50 AM    LDL, calculated 56.6 12/06/2021 08:50 AM    Triglyceride 157 (H) 12/06/2021 08:50 AM    CHOL/HDL Ratio 2.5 12/06/2021 08:50 AM     Lab Results   Component Value Date/Time    ALT (SGPT) 30 12/06/2021 08:50 AM    Alk. phosphatase 73 12/22/2020 10:04 AM    Bilirubin, total 0.5 12/22/2020 10:04 AM    Albumin 4.1 12/22/2020 10:04 AM    Protein, total 7.1 12/22/2020 10:04 AM    PLATELET 173 71/49/8550 10:04 AM     Lab Results   Component Value Date/Time    GFR est non-AA >60 12/06/2021 08:50 AM    GFR est AA >60 12/06/2021 08:50 AM    Creatinine 0.92 12/06/2021 08:50 AM    BUN 15 12/06/2021 08:50 AM    Sodium 142 12/06/2021 08:50 AM    Potassium 4.1 12/06/2021 08:50 AM    Chloride 107 12/06/2021 08:50 AM    CO2 26 12/06/2021 08:50 AM     Lab Results   Component Value Date/Time    Sodium 142 12/06/2021 08:50 AM    Potassium 4.1 12/06/2021 08:50 AM    Chloride 107 12/06/2021 08:50 AM    CO2 26 12/06/2021 08:50 AM    Anion gap 9 12/06/2021 08:50 AM    Glucose 110 (H) 12/06/2021 08:50 AM    BUN 15 12/06/2021 08:50 AM    Creatinine 0.92 12/06/2021 08:50 AM    BUN/Creatinine ratio 16 12/06/2021 08:50 AM    GFR est AA >60 12/06/2021 08:50 AM    GFR est non-AA >60 12/06/2021 08:50 AM    Calcium 9.4 12/06/2021 08:50 AM    Bilirubin, total 0.5 12/22/2020 10:04 AM    ALT (SGPT) 30 12/06/2021 08:50 AM    Alk.  phosphatase 73 12/22/2020 10:04 AM    Protein, total 7.1 12/22/2020 10:04 AM    Albumin 4.1 12/22/2020 10:04 AM    Globulin 3.0 12/22/2020 10:04 AM    A-G Ratio 1.4 12/22/2020 10:04 AM          ASSESSMENT & PLAN Diagnoses and all orders for this visit:    1. Diabetes mellitus type 2, noninsulin dependent ([facility] [address] controlled, stable, A1c at goal  -     AMB POC HEMOGLOBIN A1C  -     MICROALBUMIN, UR, RAND W/ MICROALB/CREAT RATIO; Future    2. Mixed hyperlipidemia maintained on statin therapy, LDL at goal  Lab Results   Component Value Date/Time    Cholesterol, total 147 12/06/2021 08:50 AM    HDL Cholesterol 59 12/06/2021 08:50 AM    LDL, calculated 56.6 12/06/2021 08:50 AM    VLDL, calculated 31.4 12/06/2021 08:50 AM    Triglyceride 157 (H) 12/06/2021 08:50 AM    CHOL/HDL Ratio 2.5 12/06/2021 08:50 AM       Patient presents for 3 month follow up Type 2 NIDDM and medication check. After her last routine check in  we decreased her Victoza from 1.8 to 1.2 mg daily because her blood sugars and A1c have been improving so nicely w/ diet, exercise and weight reduction. Cardiovascular risk and specific A1c/ lipid/LDL goals reviewed  Reviewed medications and side effects, doing well on current regimen  Her HgbA1c was 5.9 last check 3 month's ago and today's reading is 6.0. She would like to go ahead and continue w/ the Victoza taper and keep close monitoring on her A1c. Will decrease Victoza from 1.2 mg to 0.6 mg and recheck in 3 months. Continue other medications the same at this time and follow up 3 months non fasting is fine.

## 2022-03-09 ENCOUNTER — NURSE NAVIGATOR (OUTPATIENT)
Dept: CASE MANAGEMENT | Age: 62
End: 2022-03-09

## 2022-03-09 NOTE — PROGRESS NOTES
[facility]   Breast Navigator Encounter    Name:    Indira Healy  Age:    59 y.o. Diagnosis:     LEFT breast cancer    Interdisciplinary Team:  Med-Onc:      Surg-Onc:    Dr. Rivero Lessen:      Plastics:      :      Nurse Navigator:  Iveth Saavedra, RN, BSN, CBCN      Encounter type:  [x]Patient Initiated  []Navigator Follow-up []Pre-op  []Post-op  []Check-in Prior to First Treatment []Treatment Modality Change  []Survivorship Transition []Other:       Narrative:   Returned patient's call today. She had multiple questions/concerns:    · Was trying to think about time off of work. Works from home, but wants to make sure that she gives herself some time to recover physically and emotionally. I suggested that 10 days is a good in-between timeframe. She will plan on this and let her employer know. · Recommended [employer] front closure sports bra to wear post-op, getting this a little larger than her usual to allow for some swelling. · Asked about when XRT will start. Has a conflict on 8/21 and 1/97 and then at the end of June, when she plans to go to the beach. I told her to make the radiation oncologist aware of her obligations. They can usually work around these. Hopefully her radiation will be done before the end of June. · Asked about exercise post-op. She is diabetic and is trying to lose weight. She walks 20-30 mins several times a week, more on the weekend. I told her that she can keep this up. For upper body exercise, she should limit this for a week or so and then start slowly. · Wants to know if breast radiation will affect her HgbA1C. She has just gotten this down to about 6. I told her that I was not aware of any correlation. Will research this for her. · Does not have the information on the time she needs to be at the hospital on 3/31. I will have Shaggy Moss reach out to her.   Shaggy Moss called the other day when she could not get to the phone.  · Has trouble with dizziness and nausea periodically and usually has nausea post-op. Recommended that she ask about a scopolamine patch, and I let her know that she can do this the same day as her surgery, no need to do this ahead of time. · Discussed her MammaPrint. I referred her to their patient web site so that she can get some more information. I will continue to follow her.            Shreya Wright RN, BSN, [facility]  Oncology Breast Navigator     [address] [place] [address]   [address] [place] [address], [facility], [facility].  W: [phone]  F: [phone]  [email or web address]  Good Help to Those in [facility]

## 2022-03-18 PROBLEM — C50.912 INFILTRATING DUCTAL CARCINOMA OF LEFT BREAST (HCC): Status: ACTIVE | Noted: 2022-02-02

## 2022-03-18 PROBLEM — M77.50 BONE SPUR OF FOOT: Status: ACTIVE | Noted: 2017-02-24

## 2022-03-18 PROBLEM — M76.62 TENDONITIS, ACHILLES, LEFT: Status: ACTIVE | Noted: 2019-01-09

## 2022-03-19 ENCOUNTER — PATIENT MESSAGE (OUTPATIENT)
Dept: CASE MANAGEMENT | Age: 62
End: 2022-03-19

## 2022-03-19 PROBLEM — E78.2 MIXED HYPERLIPIDEMIA: Status: ACTIVE | Noted: 2020-03-26

## 2022-03-19 PROBLEM — E04.2 MULTINODULAR GOITER: Status: ACTIVE | Noted: 2022-02-02

## 2022-03-19 PROBLEM — K21.00 HIATAL HERNIA WITH GERD AND ESOPHAGITIS: Status: ACTIVE | Noted: 2020-03-26

## 2022-03-19 PROBLEM — K44.9 HIATAL HERNIA WITH GERD AND ESOPHAGITIS: Status: ACTIVE | Noted: 2020-03-26

## 2022-03-19 PROBLEM — Z85.828 HISTORY OF SCC (SQUAMOUS CELL CARCINOMA) OF SKIN: Status: ACTIVE | Noted: 2019-01-09

## 2022-03-19 PROBLEM — M76.61 TENDONITIS, ACHILLES, RIGHT: Status: ACTIVE | Noted: 2018-04-19

## 2022-03-20 PROBLEM — M77.32 HEEL SPUR, LEFT: Status: ACTIVE | Noted: 2018-09-19

## 2022-03-24 ENCOUNTER — HOSPITAL ENCOUNTER (OUTPATIENT)
Dept: PREADMISSION TESTING | Age: 62
Discharge: HOME | End: 2022-03-24
Payer: COMMERCIAL

## 2022-03-24 ENCOUNTER — DOCUMENTATION ONLY (OUTPATIENT)
Dept: SURGERY | Age: 62
End: 2022-03-24

## 2022-03-24 VITALS
TEMPERATURE: 98.2 F | HEIGHT: 67 IN | HEART RATE: 71 BPM | DIASTOLIC BLOOD PRESSURE: 65 MMHG | BODY MASS INDEX: 31.11 KG/M2 | WEIGHT: 198.19 LBS | SYSTOLIC BLOOD PRESSURE: 106 MMHG

## 2022-03-24 DIAGNOSIS — C50.912 MALIGNANT NEOPLASM OF LEFT FEMALE BREAST, UNSPECIFIED ESTROGEN RECEPTOR STATUS, UNSPECIFIED SITE OF BREAST (HCC): ICD-10-CM

## 2022-03-24 LAB
ANION GAP SERPL CALC-SCNC: 6 MMOL/L (ref 5–15)
ATRIAL RATE: 67 BPM
BASOPHILS # BLD: 0 K/UL (ref 0–0.1)
BASOPHILS NFR BLD: 0 % (ref 0–1)
BUN SERPL-MCNC: 19 MG/DL (ref 6–20)
BUN/CREAT SERPL: 25 (ref 12–20)
CALCIUM SERPL-MCNC: 9.1 MG/DL (ref 8.5–10.1)
CALCULATED P AXIS, ECG09: 17 DEGREES
CALCULATED R AXIS, ECG10: 12 DEGREES
CALCULATED T AXIS, ECG11: 34 DEGREES
CHLORIDE SERPL-SCNC: 109 MMOL/L (ref 97–108)
CO2 SERPL-SCNC: 24 MMOL/L (ref 21–32)
CREAT SERPL-MCNC: 0.75 MG/DL (ref 0.55–1.02)
DIAGNOSIS, 93000: NORMAL
DIFFERENTIAL METHOD BLD: NORMAL
EOSINOPHIL # BLD: 0.2 K/UL (ref 0–0.4)
EOSINOPHIL NFR BLD: 2 % (ref 0–7)
ERYTHROCYTE [DISTWIDTH] IN BLOOD BY AUTOMATED COUNT: 13 % (ref 11.5–14.5)
EST. AVERAGE GLUCOSE BLD GHB EST-MCNC: 126 MG/DL
GLUCOSE SERPL-MCNC: 124 MG/DL (ref 65–100)
HBA1C MFR BLD: 6 % (ref 4–5.6)
HCT VFR BLD AUTO: 44.2 % (ref 35–47)
HGB BLD-MCNC: 14.4 G/DL (ref 11.5–16)
IMM GRANULOCYTES # BLD AUTO: 0 K/UL (ref 0–0.04)
IMM GRANULOCYTES NFR BLD AUTO: 0 % (ref 0–0.5)
LYMPHOCYTES # BLD: 2.8 K/UL (ref 0.8–3.5)
LYMPHOCYTES NFR BLD: 36 % (ref 12–49)
MCH RBC QN AUTO: 30.1 PG (ref 26–34)
MCHC RBC AUTO-ENTMCNC: 32.6 G/DL (ref 30–36.5)
MCV RBC AUTO: 92.5 FL (ref 80–99)
MONOCYTES # BLD: 0.6 K/UL (ref 0–1)
MONOCYTES NFR BLD: 7 % (ref 5–13)
NEUTS SEG # BLD: 4.3 K/UL (ref 1.8–8)
NEUTS SEG NFR BLD: 55 % (ref 32–75)
NRBC # BLD: 0 K/UL (ref 0–0.01)
NRBC BLD-RTO: 0 PER 100 WBC
P-R INTERVAL, ECG05: 168 MS
PLATELET # BLD AUTO: 238 K/UL (ref 150–400)
PMV BLD AUTO: 10.4 FL (ref 8.9–12.9)
POTASSIUM SERPL-SCNC: 4.1 MMOL/L (ref 3.5–5.1)
Q-T INTERVAL, ECG07: 420 MS
QRS DURATION, ECG06: 86 MS
QTC CALCULATION (BEZET), ECG08: 443 MS
RBC # BLD AUTO: 4.78 M/UL (ref 3.8–5.2)
SODIUM SERPL-SCNC: 139 MMOL/L (ref 136–145)
VENTRICULAR RATE, ECG03: 67 BPM
WBC # BLD AUTO: 7.9 K/UL (ref 3.6–11)

## 2022-03-24 PROCEDURE — 93005 ELECTROCARDIOGRAM TRACING: CPT

## 2022-03-24 PROCEDURE — 83036 HEMOGLOBIN GLYCOSYLATED A1C: CPT

## 2022-03-24 PROCEDURE — 80048 BASIC METABOLIC PNL TOTAL CA: CPT

## 2022-03-24 PROCEDURE — 85025 COMPLETE CBC W/AUTO DIFF WBC: CPT

## 2022-03-24 PROCEDURE — 36415 COLL VENOUS BLD VENIPUNCTURE: CPT

## 2022-03-24 RX ORDER — MECLIZINE HYDROCHLORIDE 25 MG/1
TABLET ORAL
COMMUNITY

## 2022-03-24 RX ORDER — DICLOFENAC SODIUM 10 MG/G
GEL TOPICAL
COMMUNITY
End: 2022-09-20

## 2022-03-24 NOTE — PERIOP NOTES
[address] INSTRUCTIONS    Surgery Date:   03/31/2022    [facility] staff will call you between 4 PM- 8 PM the day before surgery with your arrival time. If your surgery is on a Monday, we will call you the preceding Friday. Please call [phone] after 8 PM if you did not receive your arrival time. 1. Please report to [facility] Patient Access/Admitting on the 1st floor. Bring your insurance card, photo identification, and any copayment ( if applicable). 2. If you are going home the same day of your surgery, you must have a responsible adult to drive you home. You need to have a responsible adult to stay with you the first 24 hours after surgery and you should not drive a car for 24 hours following your surgery. 3. Do NOT eat any solid foods after midnight the night before surgery including candy, mint or gum. You may drink clear liquids from midnight until 1 hour prior to your arrival. You may drink up to 12 ounces at one time every 4 hours. Please note special instructions, if applicable, below for medications. 4. Do NOT drink alcohol or smoke 24 hours before surgery. STOP smoking for 14 days prior as it helps with breathing and healing after surgery. 5. If you are being admitted to the hospital, please leave personal belongings/luggage in your car until you have an assigned hospital room number. 6. Please wear comfortable clothes. Wear your glasses instead of contacts. We ask that all money, jewelry and valuables be left at home. Wear no make up, particularly mascara, the day of surgery. 7.  All body piercings, rings, and jewelry need to be removed and left at home. Please remove any nail polish or artifical nails from your fingernails. Please wear your hair loose or down. Please no pony-tails, buns, or any metal hair accessories. If you shower the morning of surgery, please do not apply any lotions or powders afterwards. You may wear deodorant, unless having breast surgery.   Do not shave any body area within 24 hours of your surgery. 8. Please follow all instructions to avoid any potential surgical cancellation. 9. Should your physical condition change, (i.e. fever, cold, flu, etc.) please notify your surgeon as soon as possible. 10. It is important to be on time. If a situation occurs where you may be delayed, please call:  [phone] / [phone] on the day of surgery. 11. The Preadmission Testing staff can be reached at [phone]. 12. Special instructions: BRING ADVANCE DIRECTIVE      Current Outpatient Medications   Medication Sig    omega-3/dha/epa/fish/turmeric (OMEGA MONOPURE CURCUMIN EC PO) Take  by mouth daily.  MAGNESIUM GLYCINATE PO Take 250 mg by mouth daily.  acetaminophen/diphenhydramine (TYLENOL PM PO) Take  by mouth nightly as needed.  meclizine (ANTIVERT) 25 mg tablet Take  by mouth three (3) times daily as needed for Dizziness.  diclofenac (VOLTAREN) 1 % gel Apply  to affected area four (4) times daily as needed for Pain. 2%    Lactobacillus acidophilus (PROBIOTIC ACIDOPHILUS PO) Take 1 Capsule by mouth daily.  Victoza 3-Manolo 0.6 mg/0.1 mL (18 mg/3 mL) pnij INJECT 1.8 MG (0.3 ML) SUBCUTANEOUSLY DAILY (Patient taking differently: 0.6 mg. INJECT 0.6 MG (0.3 ML) SUBCUTANEOUSLY DAILY)    Synjardy XR 10-1,000 mg TBph TAKE 1 TABLET BY MOUTH EVERY DAY IN THE MORNING WITH BREAKFAST    rosuvastatin (CRESTOR) 10 mg tablet TAKE 1 TABLET BY MOUTH EVERY DAY AT NIGHT    fluticasone propionate (FLONASE NA) by Nasal route.  pen needle, diabetic (NovoTwist) 32 gauge x 1/5\" ndle USE ONCE DAILY    glucose blood VI test strips (Accu-Chek Nayeli Plus test strp) strip PER INSURANCE PREFERENCE, TEST 1X/D DX DIABETES E11.9    cholecalciferol (VITAMIN D3) (2,000 UNITS /50 MCG) cap capsule Take 2,000 Units by mouth daily.  melatonin 3 mg tablet Take 3 mg by mouth nightly.     krill-om-3-dha-epa-phospho-ast (MEGARED OMEGA-3 KRILL OIL) 1,000-230-60 mg cap Take 1 Cap by mouth daily. No current facility-administered medications for this encounter. 1. YOU MUST ONLY TAKE THESE MEDICATIONS THE MORNING OF SURGERY WITH A SIP OF WATER: PROBIOTIC,   2. MEDICATIONS TO TAKE THE MORNING OF SURGERY ONLY IF NEEDED: TYLENOL. 3. HOLD these prescription medications BEFORE Surgery: NONE  4. Ask your surgeon/prescribing physician about when/if to STOP taking these medications: Magui Ion  5. Stop all vitamins, herbal medicines and Aspirin containing products 7 days prior to surgery. Stop any non-steroidal anti-inflammatory drugs (i.e. Ibuprofen, Naproxen, Advil, Aleve) 3 days before surgery. You may take Tylenol. 6. If you are currently taking Plavix, Coumadin,or any other blood-thinning/anticoagulant medication contact your prescribing physician for instructions. Eating and Drinking Before Surgery     You may eat a regular dinner at the usual time on the day before your surgery.  Do NOT eat any solid foods after midnight unless your arrival time at the hospital is 3pm or later.  You may drink clear liquids only from 12 midnight until 1 hours prior to your arrival time at the hospital on the day of your surgery. Do NOT drink alcohol.    Clear liquids include:  o Water  o Fruit juices without pulp( i.e. apple juice)  o Carbonated beverages  o Black coffee (no cream/milk)  o Tea (no cream/milk)  o Gatorade   You may drink up to 12-16 ounces at one time every 4 hours between the hours of midnight and 1 hour before your arrival time at the hospital. Example- if your arrival time at the hospital is 6am, you may drink 12-16 ounces of clear liquids no later than 5am.   If your arrival time at the hospital is 3pm or later, you may eat a light breakfast before 8am.   A light breakfast includes:  o Toast or bagel (no butter)  o Black coffee (no cream/milk)  o Tea (no cream/milk)  o Fruit juices without pulp ( i.e. apple juice)  o Do NOT eat meat, eggs, vegetables or fruit   If you have any questions, please contact your surgeon's office. Preventing Infections Before and After - Your Surgery    IMPORTANT INSTRUCTIONS    You play an important role in your health and preparation for surgery. To reduce the germs on your skin you will need to shower with CHG soap (Chorhexidine gluconate 4%) two times before surgery. CHG soap (Hibiclens, Hex-A-Clens or store brand)   CHG soap will be provided at your Preadmission Testing (PAT) appointment.  If you do not have a PAT appointment before surgery, you may arrange to  CHG soap from our office or purchase CHG soap at a pharmacy, grocery or department store.  You need to purchase TWO 4 ounce bottles to use for your 2 showers. Steps to follow:  1. Wash your hair with your normal shampoo and your body with regular soap and rinse well to remove shampoo and soap from your skin. 2. Wet a clean washcloth and turn off the shower. 3. Put CHG soap on washcloth and apply to your entire body from the neck down. Do not use on your head, face or private parts(genitals). Do not use CHG soap on open sores, wounds or areas of skin irritation. 4. Wash you body gently for 5 minutes. Do not wash your skin too hard. This soap does not create lather. Pay special attention to your underarms and from your belly button to your feet. 5. Turn the shower back on and rinse well to get CHG soap off your body. 6. Pat your skin dry with a clean, dry towel. Do not apply lotions or moisturizer. 7. Put on clean clothes and sleep on fresh bed sheets and do not allow pets to sleep with you. Shower with CHG soap 2 times before your surgery   The evening before your surgery   The morning of your surgery      Tips to help prevent infections after your surgery:  1. Protect your surgical wound from germs:  ? Hand washing is the most important thing you and your caregivers can do to prevent infections. ? Keep your bandage clean and dry!   ? Do not touch your surgical wound. 2. Use clean, freshly washed towels and washcloths every time you shower; do not share bath linens with others. 3. Until your surgical wound is healed, wear clothing and sleep on bed linens each day that are clean and freshly washed. 4. Do not allow pets to sleep in your bed with you or touch your surgical wound. 5. Do not smoke - smoking delays wound healing. This may be a good time to stop smoking. 6. If you have diabetes, it is important for you to manage your blood sugar levels properly before your surgery as well as after your surgery. Poorly managed blood sugar levels slow down wound healing and prevent you from healing completely. [address]   Instructions for Pre-Surgery COVID-19 Testing     Across our ministry we have established standard guidelines to ensure the health and safety of our patients, residents and associates as we resume elective services for patients. All patients presenting for surgery are required to have a COVID-19 test result within 96 hours of their scheduled surgery. [facility] is providing this test free of charge to the patient.    Instructions for COVID-19 Testing:     Patients will receive a call from Pre-Admission Testing 4-5 days prior to surgery to schedule a date and time to come to the [address] [place] [address] Drive for their COVID-19 test   Patients are advised to self-quarantine after testing until their scheduled surgery   Once on site, patients will be registered and receive COVID test in their vehicle   If a patient is scheduled for normal Pre Admission Testing 96 hours from date of surgery, the patient will still have their COVID test done at the [address] [place] [address] located at [address] Positive results will be shared with the surgeon and anesthesiologist and may result in cancellation of the elective procedure    Testing Hours and Location:   Address:  [address] [place] [address] [facility]  Located in the Discharge Lot on [facility] (Map Attached)  [address], [address]   Hours: Monday- Friday 7a-3p    PAT Phone Number: [phone]              Patient Information Regarding COVID Restrictions    Patients are advised to self-quarantine after COVID testing up to the day of the scheduled procedure. Day of Procedure     Please park in the parking deck or any designated visitor parking lot.  Enter the facility through the Main Entrance of the hospital.   A temperature check and appropriate symptom/exposure screening will be done prior to entry to the facility.  On the day of surgery, please provide the cell phone number of the person who will be waiting for you to the Patient Access representative at the time of registration.  Please wear a mask on the day of your procedure.  We are now allowing one designated visitor per stay. Pediatric patients may have 2 designated visitors. This one person may come in with you on the day of your procedure.  No visitors under the age of 13.  The designated visitor must also wear a mask.  Once your procedure and the immediate recovery period is completed, a nurse in the recovery area will contact your designated visitor to inform them of your room number or to otherwise review other pertinent information regarding your care.  Social distancing practices are to be adhered to in waiting areas and the cafeteria. The patient was contacted  in person. She verbalized understanding of all instructions does not  need reinforcement.

## 2022-03-28 ENCOUNTER — TELEPHONE (OUTPATIENT)
Dept: FAMILY MEDICINE CLINIC | Age: 62
End: 2022-03-28

## 2022-03-28 ENCOUNTER — NURSE NAVIGATOR (OUTPATIENT)
Dept: CASE MANAGEMENT | Age: 62
End: 2022-03-28

## 2022-03-28 NOTE — NURSE NAVIGATOR
[facility]   Breast Navigator Encounter    Name: Tika Godinez  Age: 59 y.o.  : 1960  Diagnosis: Left breast IDC; ER+/WA+/HER2-    Encounter type:  []Initial Navigator Encounter  [x]Patient Initiated  []Navigator Follow-up []Pre-op  []Post-op []Check-in Prior to First Treatment []Treatment Modality Change   []Other:     Narrative:   Pt called asking if it was okay to continue her diabetes medications Synjardy and Victoza prior to surgery. Explained that since she will be fasting the morning of her surgery that it would be best to hold these to prevent her blood glucose from dropping. Sent message to her PCP, Dr. Darylene Righter, as well as to Dr. Ronald Long regarding this, per pt request. Both Dr. Ronald Long and Dr. Minesh Lentz replied that pt should not take her diabetes medications the day of surgery. Pt updated.     Interdisciplinary Team:  Med-Onc:  Surg-Onc: Dr. Ann Marie Sharif MD  Rad-Onc:  Plastics:  :   Nurse Navigator: Daryl Smith RN/Liseth Nunez, DALTON BrianN, RN, [facility]  Breast Cancer Nurse [address] [place] [address]  W: [phone]  F: [phone]  [email or web address]   Good Help to Those in [facility]

## 2022-03-28 NOTE — TELEPHONE ENCOUNTER
----- Message from Aleta Gutierrez MD sent at 3/28/2022  9:41 AM EDT -----  Regarding: RE: Pre-op med advice  Since she will be NPO she should not take her diabetes meds the morning of surgery. Thanks! Called pt to let her know of Dr Emeline Lundborg rec. Pt states that she takes the Victoza in the evening and the synjardy in the morning. Advised that per Dr Emeline Lundborg she will hold the victoza the the night before her procedure and hold the synjardy the morning of. She can resume after procedure and having had something to eat. Pt states understanding.    ----- Message -----  From: Jesenia Zaragoza  Sent: 3/28/2022   9:28 AM EDT  To: Aleta Gutierrez MD  Subject: Pre-op med advice                                Good morning, I'm one of the breast cancer nurse navigators working with Dr. Eun Ford. Ms. Anat Diane is having a lumpectomy next Tuesday and she called with questions about her Synjardy and Victoza and if they are okay to take prior to surgery. I have a message out to Dr. Eun Ford but she wanted me to run it by you as well.    Thank you,  Rodolfo Okeefe RN

## 2022-03-29 ENCOUNTER — HOSPITAL ENCOUNTER (OUTPATIENT)
Dept: MAMMOGRAPHY | Age: 62
Discharge: HOME | End: 2022-03-29
Attending: SURGERY
Payer: COMMERCIAL

## 2022-03-29 DIAGNOSIS — C50.912 MALIGNANT NEOPLASM OF LEFT FEMALE BREAST, UNSPECIFIED ESTROGEN RECEPTOR STATUS, UNSPECIFIED SITE OF BREAST (HCC): ICD-10-CM

## 2022-03-29 PROCEDURE — 74011000250 HC RX REV CODE- 250: Performed by: RADIOLOGY

## 2022-03-29 PROCEDURE — 19283 PERQ DEV BREAST 1ST STRTCTC: CPT

## 2022-03-29 RX ADMIN — LIDOCAINE HYDROCHLORIDE 5 ML: 10 INJECTION, SOLUTION INFILTRATION; PERINEURAL at 09:40

## 2022-03-29 NOTE — PERIOP NOTES
Telephone call to surgical posting notified Piper  to note on surgical schedule - pseudocholinesterase defieciency.

## 2022-03-30 RX ORDER — LIDOCAINE HYDROCHLORIDE 10 MG/ML
5 INJECTION INFILTRATION; PERINEURAL
Status: COMPLETED | OUTPATIENT
Start: 2022-03-30 | End: 2022-03-29

## 2022-03-31 ENCOUNTER — ANESTHESIA EVENT (OUTPATIENT)
Dept: MEDSURG UNIT | Age: 62
End: 2022-03-31
Payer: COMMERCIAL

## 2022-03-31 ENCOUNTER — ANESTHESIA (OUTPATIENT)
Dept: MEDSURG UNIT | Age: 62
End: 2022-03-31
Payer: COMMERCIAL

## 2022-03-31 ENCOUNTER — HOSPITAL ENCOUNTER (OUTPATIENT)
Age: 62
Setting detail: OUTPATIENT SURGERY
Discharge: HOME | End: 2022-03-31
Attending: SURGERY | Admitting: SURGERY
Payer: COMMERCIAL

## 2022-03-31 ENCOUNTER — APPOINTMENT (OUTPATIENT)
Dept: NUCLEAR MEDICINE | Age: 62
End: 2022-03-31
Attending: SURGERY
Payer: COMMERCIAL

## 2022-03-31 VITALS
HEIGHT: 67 IN | DIASTOLIC BLOOD PRESSURE: 74 MMHG | BODY MASS INDEX: 31.11 KG/M2 | OXYGEN SATURATION: 94 % | WEIGHT: 198.19 LBS | TEMPERATURE: 98.3 F | HEART RATE: 94 BPM | SYSTOLIC BLOOD PRESSURE: 130 MMHG | RESPIRATION RATE: 13 BRPM

## 2022-03-31 DIAGNOSIS — C50.912 MALIGNANT NEOPLASM OF LEFT FEMALE BREAST, UNSPECIFIED ESTROGEN RECEPTOR STATUS, UNSPECIFIED SITE OF BREAST (HCC): ICD-10-CM

## 2022-03-31 LAB
GLUCOSE BLD STRIP.AUTO-MCNC: 113 MG/DL (ref 65–117)
GLUCOSE BLD STRIP.AUTO-MCNC: 146 MG/DL (ref 65–117)
SERVICE CMNT-IMP: ABNORMAL
SERVICE CMNT-IMP: NORMAL

## 2022-03-31 PROCEDURE — 19301 PARTIAL MASTECTOMY: CPT | Performed by: SURGERY

## 2022-03-31 PROCEDURE — 77030011267 HC ELECTRD BLD COVD -A: Performed by: SURGERY

## 2022-03-31 PROCEDURE — 76030000003 HC AMB SURG OR TIME 1.5 TO 2: Performed by: SURGERY

## 2022-03-31 PROCEDURE — 76210000035 HC AMBSU PH I REC 1 TO 1.5 HR: Performed by: SURGERY

## 2022-03-31 PROCEDURE — A9520 TC99 TILMANOCEPT DIAG 0.5MCI: HCPCS

## 2022-03-31 PROCEDURE — 14301 TIS TRNFR ANY 30.1-60 SQ CM: CPT | Performed by: SURGERY

## 2022-03-31 PROCEDURE — 2709999900 HC NON-CHARGEABLE SUPPLY: Performed by: SURGERY

## 2022-03-31 PROCEDURE — 74011250636 HC RX REV CODE- 250/636: Performed by: ANESTHESIOLOGY

## 2022-03-31 PROCEDURE — 74011000250 HC RX REV CODE- 250: Performed by: NURSE ANESTHETIST, CERTIFIED REGISTERED

## 2022-03-31 PROCEDURE — 74011250636 HC RX REV CODE- 250/636: Performed by: SURGERY

## 2022-03-31 PROCEDURE — 38525 BIOPSY/REMOVAL LYMPH NODES: CPT | Performed by: SURGERY

## 2022-03-31 PROCEDURE — 74011250636 HC RX REV CODE- 250/636: Performed by: NURSE ANESTHETIST, CERTIFIED REGISTERED

## 2022-03-31 PROCEDURE — 88307 TISSUE EXAM BY PATHOLOGIST: CPT

## 2022-03-31 PROCEDURE — 77030031139 HC SUT VCRL2 J&J -A: Performed by: SURGERY

## 2022-03-31 PROCEDURE — 74011000250 HC RX REV CODE- 250: Performed by: SURGERY

## 2022-03-31 PROCEDURE — 76060000063 HC AMB SURG ANES 1.5 TO 2 HR: Performed by: SURGERY

## 2022-03-31 PROCEDURE — 77030034626 HC LIGASURE SM JAW SEAL OPN SURG COVD -E: Performed by: SURGERY

## 2022-03-31 PROCEDURE — 77030010509 HC AIRWY LMA MSK TELE -A: Performed by: ANESTHESIOLOGY

## 2022-03-31 PROCEDURE — 77030002933 HC SUT MCRYL J&J -A: Performed by: SURGERY

## 2022-03-31 PROCEDURE — 77030002996 HC SUT SLK J&J -A: Performed by: SURGERY

## 2022-03-31 PROCEDURE — 74011250637 HC RX REV CODE- 250/637: Performed by: ANESTHESIOLOGY

## 2022-03-31 PROCEDURE — 77030041680 HC PNCL ELECSURG SMK EVAC CNMD -B: Performed by: SURGERY

## 2022-03-31 PROCEDURE — 82962 GLUCOSE BLOOD TEST: CPT

## 2022-03-31 PROCEDURE — 77030040361 HC SLV COMPR DVT MDII -B: Performed by: SURGERY

## 2022-03-31 PROCEDURE — 77030010507 HC ADH SKN DERMBND J&J -B: Performed by: SURGERY

## 2022-03-31 PROCEDURE — 77030040922 HC BLNKT HYPOTHRM STRY -A

## 2022-03-31 RX ORDER — PROPOFOL 10 MG/ML
INJECTION, EMULSION INTRAVENOUS AS NEEDED
Status: DISCONTINUED | OUTPATIENT
Start: 2022-03-31 | End: 2022-03-31 | Stop reason: HOSPADM

## 2022-03-31 RX ORDER — HYDROMORPHONE HYDROCHLORIDE 1 MG/ML
0.2 INJECTION, SOLUTION INTRAMUSCULAR; INTRAVENOUS; SUBCUTANEOUS
Status: ACTIVE | OUTPATIENT
Start: 2022-03-31 | End: 2022-03-31

## 2022-03-31 RX ORDER — SODIUM CHLORIDE, SODIUM LACTATE, POTASSIUM CHLORIDE, CALCIUM CHLORIDE 600; 310; 30; 20 MG/100ML; MG/100ML; MG/100ML; MG/100ML
100 INJECTION, SOLUTION INTRAVENOUS CONTINUOUS
Status: DISCONTINUED | OUTPATIENT
Start: 2022-03-31 | End: 2022-03-31 | Stop reason: HOSPADM

## 2022-03-31 RX ORDER — MIDAZOLAM HYDROCHLORIDE 1 MG/ML
0.5 INJECTION, SOLUTION INTRAMUSCULAR; INTRAVENOUS
Status: DISCONTINUED | OUTPATIENT
Start: 2022-03-31 | End: 2022-03-31 | Stop reason: HOSPADM

## 2022-03-31 RX ORDER — SODIUM CHLORIDE 9 MG/ML
25 INJECTION, SOLUTION INTRAVENOUS CONTINUOUS
Status: DISCONTINUED | OUTPATIENT
Start: 2022-03-31 | End: 2022-03-31 | Stop reason: HOSPADM

## 2022-03-31 RX ORDER — OXYCODONE AND ACETAMINOPHEN 5; 325 MG/1; MG/1
1 TABLET ORAL
Qty: 20 TABLET | Refills: 0 | Status: SHIPPED | OUTPATIENT
Start: 2022-03-31 | End: 2022-04-07

## 2022-03-31 RX ORDER — MIDAZOLAM HYDROCHLORIDE 1 MG/ML
INJECTION, SOLUTION INTRAMUSCULAR; INTRAVENOUS AS NEEDED
Status: DISCONTINUED | OUTPATIENT
Start: 2022-03-31 | End: 2022-03-31 | Stop reason: HOSPADM

## 2022-03-31 RX ORDER — FENTANYL CITRATE 50 UG/ML
50 INJECTION, SOLUTION INTRAMUSCULAR; INTRAVENOUS AS NEEDED
Status: DISCONTINUED | OUTPATIENT
Start: 2022-03-31 | End: 2022-03-31 | Stop reason: HOSPADM

## 2022-03-31 RX ORDER — MIDAZOLAM HYDROCHLORIDE 1 MG/ML
1 INJECTION, SOLUTION INTRAMUSCULAR; INTRAVENOUS AS NEEDED
Status: DISCONTINUED | OUTPATIENT
Start: 2022-03-31 | End: 2022-03-31 | Stop reason: HOSPADM

## 2022-03-31 RX ORDER — LIDOCAINE HYDROCHLORIDE AND EPINEPHRINE 10; 10 MG/ML; UG/ML
30 INJECTION, SOLUTION INFILTRATION; PERINEURAL ONCE
Status: DISCONTINUED | OUTPATIENT
Start: 2022-03-31 | End: 2022-03-31 | Stop reason: HOSPADM

## 2022-03-31 RX ORDER — SCOLOPAMINE TRANSDERMAL SYSTEM 1 MG/1
1 PATCH, EXTENDED RELEASE TRANSDERMAL
Status: DISCONTINUED | OUTPATIENT
Start: 2022-03-31 | End: 2022-03-31 | Stop reason: HOSPADM

## 2022-03-31 RX ORDER — DEXAMETHASONE SODIUM PHOSPHATE 4 MG/ML
INJECTION, SOLUTION INTRA-ARTICULAR; INTRALESIONAL; INTRAMUSCULAR; INTRAVENOUS; SOFT TISSUE AS NEEDED
Status: DISCONTINUED | OUTPATIENT
Start: 2022-03-31 | End: 2022-03-31 | Stop reason: HOSPADM

## 2022-03-31 RX ORDER — DIPHENHYDRAMINE HYDROCHLORIDE 50 MG/ML
12.5 INJECTION, SOLUTION INTRAMUSCULAR; INTRAVENOUS AS NEEDED
Status: DISCONTINUED | OUTPATIENT
Start: 2022-03-31 | End: 2022-03-31 | Stop reason: HOSPADM

## 2022-03-31 RX ORDER — ROPIVACAINE HYDROCHLORIDE 5 MG/ML
150 INJECTION, SOLUTION EPIDURAL; INFILTRATION; PERINEURAL AS NEEDED
Status: DISCONTINUED | OUTPATIENT
Start: 2022-03-31 | End: 2022-03-31 | Stop reason: HOSPADM

## 2022-03-31 RX ORDER — BUPIVACAINE HYDROCHLORIDE AND EPINEPHRINE 5; 5 MG/ML; UG/ML
30 INJECTION, SOLUTION EPIDURAL; INTRACAUDAL; PERINEURAL ONCE
Status: COMPLETED | OUTPATIENT
Start: 2022-03-31 | End: 2022-03-31

## 2022-03-31 RX ORDER — MORPHINE SULFATE 2 MG/ML
2 INJECTION, SOLUTION INTRAMUSCULAR; INTRAVENOUS
Status: DISCONTINUED | OUTPATIENT
Start: 2022-03-31 | End: 2022-03-31 | Stop reason: HOSPADM

## 2022-03-31 RX ORDER — EPHEDRINE SULFATE/0.9% NACL/PF 50 MG/5 ML
5 SYRINGE (ML) INTRAVENOUS AS NEEDED
Status: DISCONTINUED | OUTPATIENT
Start: 2022-03-31 | End: 2022-03-31 | Stop reason: HOSPADM

## 2022-03-31 RX ORDER — ONDANSETRON 2 MG/ML
4 INJECTION INTRAMUSCULAR; INTRAVENOUS AS NEEDED
Status: DISCONTINUED | OUTPATIENT
Start: 2022-03-31 | End: 2022-03-31 | Stop reason: HOSPADM

## 2022-03-31 RX ORDER — OXYCODONE HYDROCHLORIDE 5 MG/1
5 TABLET ORAL AS NEEDED
Status: DISCONTINUED | OUTPATIENT
Start: 2022-03-31 | End: 2022-03-31 | Stop reason: HOSPADM

## 2022-03-31 RX ORDER — SODIUM CHLORIDE, SODIUM LACTATE, POTASSIUM CHLORIDE, CALCIUM CHLORIDE 600; 310; 30; 20 MG/100ML; MG/100ML; MG/100ML; MG/100ML
1000 INJECTION, SOLUTION INTRAVENOUS CONTINUOUS
Status: DISCONTINUED | OUTPATIENT
Start: 2022-03-31 | End: 2022-03-31 | Stop reason: HOSPADM

## 2022-03-31 RX ORDER — ONDANSETRON 2 MG/ML
INJECTION INTRAMUSCULAR; INTRAVENOUS AS NEEDED
Status: DISCONTINUED | OUTPATIENT
Start: 2022-03-31 | End: 2022-03-31 | Stop reason: HOSPADM

## 2022-03-31 RX ORDER — FENTANYL CITRATE 50 UG/ML
INJECTION, SOLUTION INTRAMUSCULAR; INTRAVENOUS AS NEEDED
Status: DISCONTINUED | OUTPATIENT
Start: 2022-03-31 | End: 2022-03-31 | Stop reason: HOSPADM

## 2022-03-31 RX ORDER — ACETAMINOPHEN 325 MG/1
650 TABLET ORAL ONCE
Status: COMPLETED | OUTPATIENT
Start: 2022-03-31 | End: 2022-03-31

## 2022-03-31 RX ORDER — FENTANYL CITRATE 50 UG/ML
25 INJECTION, SOLUTION INTRAMUSCULAR; INTRAVENOUS
Status: DISCONTINUED | OUTPATIENT
Start: 2022-03-31 | End: 2022-03-31 | Stop reason: HOSPADM

## 2022-03-31 RX ORDER — LIDOCAINE HYDROCHLORIDE 20 MG/ML
INJECTION, SOLUTION EPIDURAL; INFILTRATION; INTRACAUDAL; PERINEURAL AS NEEDED
Status: DISCONTINUED | OUTPATIENT
Start: 2022-03-31 | End: 2022-03-31 | Stop reason: HOSPADM

## 2022-03-31 RX ORDER — SODIUM CHLORIDE, SODIUM LACTATE, POTASSIUM CHLORIDE, CALCIUM CHLORIDE 600; 310; 30; 20 MG/100ML; MG/100ML; MG/100ML; MG/100ML
INJECTION, SOLUTION INTRAVENOUS
Status: DISCONTINUED | OUTPATIENT
Start: 2022-03-31 | End: 2022-03-31 | Stop reason: HOSPADM

## 2022-03-31 RX ORDER — LIDOCAINE HYDROCHLORIDE 10 MG/ML
0.1 INJECTION, SOLUTION EPIDURAL; INFILTRATION; INTRACAUDAL; PERINEURAL AS NEEDED
Status: DISCONTINUED | OUTPATIENT
Start: 2022-03-31 | End: 2022-03-31 | Stop reason: HOSPADM

## 2022-03-31 RX ADMIN — DEXAMETHASONE SODIUM PHOSPHATE 4 MG: 4 INJECTION, SOLUTION INTRAMUSCULAR; INTRAVENOUS at 14:26

## 2022-03-31 RX ADMIN — PROPOFOL 150 MG: 10 INJECTION, EMULSION INTRAVENOUS at 14:10

## 2022-03-31 RX ADMIN — SODIUM CHLORIDE, POTASSIUM CHLORIDE, SODIUM LACTATE AND CALCIUM CHLORIDE: 600; 310; 30; 20 INJECTION, SOLUTION INTRAVENOUS at 14:02

## 2022-03-31 RX ADMIN — WATER 2 G: 1 INJECTION INTRAMUSCULAR; INTRAVENOUS; SUBCUTANEOUS at 14:25

## 2022-03-31 RX ADMIN — ONDANSETRON HYDROCHLORIDE 4 MG: 2 SOLUTION INTRAMUSCULAR; INTRAVENOUS at 15:49

## 2022-03-31 RX ADMIN — FENTANYL CITRATE 50 MCG: 50 INJECTION, SOLUTION INTRAMUSCULAR; INTRAVENOUS at 14:35

## 2022-03-31 RX ADMIN — ONDANSETRON HYDROCHLORIDE 4 MG: 2 INJECTION, SOLUTION INTRAMUSCULAR; INTRAVENOUS at 15:29

## 2022-03-31 RX ADMIN — MIDAZOLAM 2 MG: 1 INJECTION INTRAMUSCULAR; INTRAVENOUS at 14:02

## 2022-03-31 RX ADMIN — LIDOCAINE HYDROCHLORIDE 60 MG: 20 INJECTION, SOLUTION EPIDURAL; INFILTRATION; INTRACAUDAL; PERINEURAL at 14:10

## 2022-03-31 RX ADMIN — FENTANYL CITRATE 50 MCG: 50 INJECTION, SOLUTION INTRAMUSCULAR; INTRAVENOUS at 14:10

## 2022-03-31 RX ADMIN — OXYCODONE 5 MG: 5 TABLET ORAL at 16:22

## 2022-03-31 RX ADMIN — ACETAMINOPHEN 650 MG: 325 TABLET ORAL at 12:10

## 2022-03-31 NOTE — ANESTHESIA POSTPROCEDURE EVALUATION
Post-Anesthesia Evaluation and Assessment    Patient: Thomas Anguiano MRN: [id number]  SSN: [id number]    YOB: 1960  Age: 59 y.o. Sex: female      I have evaluated the patient and they are stable and ready for discharge from the PACU. Cardiovascular Function/Vital Signs  Visit Vitals  /61   Pulse 99   Temp 36.8 °C (98.3 °F)   Resp 14   Ht 5' 7\" (1.702 m)   Wt 89.9 kg (198 lb 3.1 oz)   SpO2 93%   BMI 31.04 kg/m²       Patient is status post General anesthesia for Procedure(s):  LEFT BREAST LUMPECTOMY WITH MAGSEED, LEFT BREAST SENTINEL NODE BIOPSY (PSEUDOCHOLINESTERASE DEFICIENCY)  . Trula Blew Nausea/Vomiting: None    Postoperative hydration reviewed and adequate. Pain:  Pain Scale 1: Numeric (0 - 10) (03/31/22 1159)  Pain Intensity 1: 0 (03/31/22 1159)   Managed    Neurological Status:   Neuro (WDL): Within Defined Limits (03/31/22 1200)   At baseline    Mental Status, Level of Consciousness: Alert and  oriented to person, place, and time    Pulmonary Status:   O2 Device: CO2 nasal cannula (03/31/22 1544)   Adequate oxygenation and airway patent    Complications related to anesthesia: None    Post-anesthesia assessment completed. No concerns    Signed By: Pasquale Trevino MD     March 31, 2022              Procedure(s):  LEFT BREAST LUMPECTOMY WITH MAGSEED, LEFT BREAST SENTINEL NODE BIOPSY (PSEUDOCHOLINESTERASE DEFICIENCY)  . .    general    <BSHSIANPOST>    INITIAL Post-op Vital signs:   Vitals Value Taken Time   /70 03/31/22 1550   Temp 36.8 °C (98.3 °F) 03/31/22 1544   Pulse 96 03/31/22 1550   Resp 16 03/31/22 1550   SpO2 96 % 03/31/22 1550   Vitals shown include unvalidated device data.

## 2022-03-31 NOTE — H&P
HISTORY OF PRESENT ILLNESS  Allan Gonzales is a 59 y.o. female.     HPI  NEW patient consult referred by  Dr.Tamo Anish No for new breast cancer diagnosis. Pt presents for discussion about treatment. Denies pain or changes to the breast area.      Family History:  No history of breast or ovarian     Breast imaging-  Mammogram, 1/4/22 [facility], [facility] 4  MRI Results (most recent):  Results from Hospital Encounter encounter on 02/22/22     MRI BREAST BI W WO CONT     Narrative  HISTORY: 27-year-old female with newly diagnosed left breast cancer, presenting  for preoperative breast MRI.     COMPARISON: Mammography from February and January 2022, as well as December 2021. No prior MRI.     TECHNIQUE:  Bilateral breast MRI was performed using a dedicated breast coil without  compression with the patient in the prone position. Precontrast T1-weighted  images with fat suppression were obtained followed by bolus injection of 9 mL  Gadavist. Postcontrast dynamic and high-resolution images were acquired. T2-weighted axial imaging with fat suppression was also performed. The images  were analyzed using CAD analysis, enhancement curves, digital subtraction, and 2  and 3 dimensional reconstructions.     FINDINGS:     Background parenchymal enhancement: Mild     Mammographic breast density: Scattered fibroglandular breast tissue     Right breast:  There is no suspicious mass or mass enhancement within the right breast. There  is no skin thickening or nipple retraction.     There is no suspicious axillary or internal mammary chain lymphadenopathy.     Left breast:  Within the middle third of the left breast upper inner quadrant, there is  evidence of a biopsy clip, and associated biopsy tract, and ill-defined  malignant enhancement measuring up to 1 x 1.2 x 1.2 cm. There is mixed internal  kinetics. This is consistent with newly diagnosed breast carcinoma.  There is no  other suspicious enhancement within the left breast. There is no evidence of  multifocal or multicentric disease. There is no skin thickening or nipple  retraction.     There is no suspicious axillary or internal mammary chain lymphadenopathy.     Impression  Right Breast:  1. BI-RADS Assessment Category 1: Negative. No evidence of breast carcinoma  within the right breast.     Left Breast:  1. BI-RADS Assessment Category 6: Known biopsy proven malignancy- Appropriate  action should be taken. 1.2 cm of malignant masslike enhancement and associated  biopsy clip, located in the middle third of the left breast upper inner  quadrant, consistent with newly diagnosed breast carcinoma. 2. No evidence of multicentric or multifocal disease. 3. No suspicious lymphadenopathy.     RECOMMENDATIONS:  The newly diagnosed left breast malignancy appears amenable to breast  conservation therapy. There is no evidence of multifocal, multicentric, or  contralateral disease.     A summary portfolio has been created in PACS.       02/02/22: LEFT breast bx. PATH: IDC, largest focus measures 10mm, low grade, ER+(99%)/MO+(45%)/HER2-, Ki-67 15%. Clinical stage 1.  · MammaPrint pending.              Past Medical History:   Diagnosis Date    Bone spur of ankle 2018    Bone spurs of feet  2/24/2017     Podiatry Dr Garcia Cons    Diabetes mellitus type 2, noninsulin dependent ([facility] [address] 12/10/2015     3/2001    Diverticulosis age 26    FH: melanoma       also FH: [facility]    Gestational diabetes 1998    Heel spur, left 9/19/2018 2/2018, Dr Lara Fruits Hemorrhoids      Hiatal hernia with GERD and esophagitis 3/26/2020     EGD 2016    Hiatal Hernia, Gastritis, mild; BX neg for H. Pylori 4/2010 4/27/2010    History of abnormal Pap smear, s/p Cryotherapy in her 30's 12/5/2012    History of SCC (squamous cell carcinoma) of skin 1/9/2019     Skin checks and cancer screenings per [place].  Dr. Ana Dean Hx of colonoscopy 7/11/2016     [facility] Dr. Sho Kahn Left foot pain 2009; s/p CSI 2010    Left sided sciatica 2012    Meniere's disease      Mixed hyperlipidemia 3/26/2020    Multinodular goiter 2022 Endo Dr. Miguel Epstein, LMP age ~ 43 yo, No HRT 2012    Right knee pain; damage at medial meniscus; 2010    S/p colonoscopy with polypectomy 2010    Stress fracture foot       left    Stress incontinence 2012    Tendonitis, Achilles, left 2019     CSI Dr Sabra Scruggs     Tendonitis, Achilles, right 2018: Dr Sabra Scruggs, bone spur chipped    Uterine fibroid      Vitamin D deficiency 2013                Past Surgical History:   Procedure Laterality Date    HX BREAST BIOPSY Right 2014     Benign, Marlou Jewel    HX BREAST LUMPECTOMY         benign fibroadenoma right breast    HX  SECTION        Emergency for fetal distress    HX COLONOSCOPY   ~     Dr Valeria Ely; Normal except Diverticulosis, but f/u 5 yrs d/t Fhx    HX COLONOSCOPY   2016     Polyps, Multiple Diverticulae, Internal Hemorrhoids; f/u 5 yrs, Dr Palomo Black HX COLONOSCOPY   2021     Diverticulosis, incomplete study due to poor prep, repeat 1 yr    HX DILATION AND CURETTAGE   10/8/14     AUB & Uterine Polyp    HX ENDOSCOPY   2016     EGD: Hiatal Hernia, Grade 1 Esophagitis w/ reflux, Gastritis,  Dr Palomo Black HX ENDOSCOPY   2021    HX LAP CHOLECYSTECTOMY   1998     gallstones    HX SKIN BIOPSY   ~     SCC excised from chest wall, Dr. Caio Mora HYSTEROSCOPY,W/ENDO [address]   2014     AUB;  Negative         Social History            Socioeconomic History    Marital status:        Spouse name: Not on file    Number of children: 3    Years of education: Not on file    Highest education level: Not on file   Occupational History    Occupation: Former Administrative work at [employer] Occupation: Works for the [address] [employer] in [employer] Occupation: Plans to possibly retire 2022   Tobacco Use    Smoking status: Former Smoker       Packs/day: 1.00       Years: 10.00       Pack years: 10.00       Types: Cigarettes       Quit date: 1991       Years since quittin.8    Smokeless tobacco: Never Used   Vaping Use    Vaping Use: Never used   Substance and Sexual Activity    Alcohol use: Yes       Comment: 2-3 glasses wine 5 days a week    Drug use: No    Sexual activity: Yes       Partners: Male   Other Topics Concern     Service Not Asked    Blood Transfusions Not Asked    Caffeine Concern No       Comment: occ coffee, mostly just on weekends    Occupational Exposure Not Asked    Hobby Hazards Not Asked    Sleep Concern Not Asked    Stress Concern Not Asked    Weight Concern Yes       Comment: happy her wt is coming down w/ eating healthier and being more active    Special Diet Yes       Comment: cut back on sugars and making healthier food choices    Back Care Not Asked    Exercise No       Comment: nothing regular right now    Bike Helmet Not Asked    Seat Belt Not Asked    Self-Exams Not Asked   Social History Narrative     Initial PPV23  age 59; will need PCV 13 at age 69 yo and PPSV 21 again at 76 yo           Diet: Noom Diet since      Exercise: since  walks 3-5 x a week x 20 minutes     Caffeine: 2-3 cups of coffee a day     Weight: happy her wt has been gradually coming down w/ eating healthier over the years and increased exercise the past few months; down from the 240 lb range in 2019 to 203 now           Lives in the [place] [id number] with . Has 2 sons and 1 daughter. Works for the [address] [employer] [place] [employer] [place]   Likes to garden and music, theater and reading.              Social Determinants of Health          Financial Resource Strain:     Difficulty of Paying Living Expenses: Not on file   Food Insecurity:     Worried About Running Out of Food in the Last Year: Not on file    Tiarra of Food in the Last Year: Not on file   Transportation Needs:     Lack of Transportation (Medical): Not on file    Lack of Transportation (Non-Medical):  Not on file   Physical Activity:     Days of Exercise per Week: Not on file    Minutes of Exercise per Session: Not on file   Stress:     Feeling of Stress : Not on file   Social Connections:     Frequency of Communication with Friends and Family: Not on file    Frequency of Social Gatherings with Friends and Family: Not on file    Attends Amish Services: Not on file    Active Member of Clubs or Organizations: Not on file    Attends Club or Organization Meetings: Not on file    Marital Status: Not on file   Intimate Partner Violence:     Fear of Current or Ex-Partner: Not on file    Emotionally Abused: Not on file    Physically Abused: Not on file    Sexually Abused: Not on file   Housing Stability:     Unable to Pay for Housing in the Last Year: Not on file    Number of Places Lived in the Last Year: Not on file    Unstable Housing in the Last Year: Not on file                Current Outpatient Medications on File Prior to Visit   Medication Sig Dispense Refill    Victoza 3-Manolo 0.6 mg/0.1 mL (18 mg/3 mL) pnij INJECT 1.8 MG (0.3 ML) SUBCUTANEOUSLY DAILY 3 mL 3    Synjardy XR 10-1,000 mg TBph TAKE 1 TABLET BY MOUTH EVERY DAY IN THE MORNING WITH BREAKFAST 90 Each 0    rosuvastatin (CRESTOR) 10 mg tablet TAKE 1 TABLET BY MOUTH EVERY DAY AT NIGHT 90 Tablet 0    OTHER Indications: FLUOROUCIL/SALICYLIC ACID (ALT) To remove warts        fluticasone propionate (FLONASE NA) by Nasal route.        glucose blood VI test strips (Accu-Chek Nayeli Plus test strp) strip PER INSURANCE PREFERENCE, TEST 1X/D DX DIABETES E11.9 100 Strip 3    cholecalciferol (VITAMIN D3) (2,000 UNITS /50 MCG) cap capsule Take 2,000 Units by mouth daily.        TURMERIC PO Take  by mouth daily.        melatonin 3 mg tablet Take 3 mg by mouth nightly.        krill-om-3-dha-epa-phospho-ast (MEGARED OMEGA-3 KRILL OIL) 1,000-230-60 mg cap Take 1 Cap by mouth daily.        pen needle, diabetic (NovoTwist) 32 gauge x 1/5\" ndle USE ONCE DAILY (Patient not taking: Reported on 2022) 100 Pen Needle 3      No current facility-administered medications on file prior to visit.               Allergies   Allergen Reactions    Succinylcholine Other (comments)       Prolonged period of recovery following anesthesia    Glipizide Other (comments)       Frequent hypoglycemia    Other Medication Diarrhea       Intolerant of >1000 mg of Metformin    Phentermine Other (comments)       Dizziness and \"crashes\"                  OB History         3    Para   3    Term                AB        Living            SAB        IAB        Ectopic        Molar        Multiple        Live Births               Obstetric Comments   Menarche 6, LMP , # of children 1, age of 4st delivery 27, Hysterectomy/oophorectomy No/No, Breast bx Yes, history of breast feeding No, BCP Yes, Hormone therapy No     Emergency  x 1; vaginal deliveries x 2; Previous Gyn: Prudy Me at a  [facility]; new Gyn Dr Sha Butts (retired), changing [facility]; Dr. Bianca LOMBARDO: Positive for tinnitus.    Gastrointestinal: Positive for constipation. Musculoskeletal: Positive for back pain. Psychiatric/Behavioral: The patient has insomnia.    All other systems reviewed and are negative.        Physical Exam  Exam conducted with a chaperone present. Cardiovascular:      Rate and Rhythm: Normal rate and regular rhythm. Heart sounds: Normal heart sounds. Pulmonary:      Breath sounds: Normal breath sounds.    Chest:   Breasts: Breasts are symmetrical.      Right: Normal. No swelling, bleeding, inverted nipple, mass, nipple discharge, skin change, tenderness, axillary adenopathy or supraclavicular adenopathy. Left: Normal. No swelling, bleeding, inverted nipple, mass, nipple discharge, skin change, tenderness, axillary adenopathy or supraclavicular adenopathy.         Lymphadenopathy:      Cervical:      Right cervical: No superficial, deep or posterior cervical adenopathy. Left cervical: No superficial, deep or posterior cervical adenopathy. Upper Body:      Right upper body: No supraclavicular or axillary adenopathy. Left upper body: No supraclavicular or axillary adenopathy.       ASSESSMENT and PLAN      ICD-10-CM ICD-9-CM     1. Infiltrating ductal carcinoma of left breast (HCC)  C50.912 174. 9        New pt presents for consultation for treatment of LEFT breast IDC, ER+(99%)/PA+(45%)/HER2-, Ki-67 15%, clinical stage 1. Physical exam today normal. Will plan for wire loc or Magseed loc prior to surgery to localize biopsy clips.      We had a long discussion of options for treatment. The patient was accompanied by  during this encounter, and over half the time was spent on counseling and coordination of care. We discussed in depth the pathology and MRI results, and the need for treatment for extent of disease and surgical planning. The goals of treatment are to treat the breast, and to reduce risk of local or distant recurrence.     Discussed treatment options with risks, complications, benefits, and limitations, including lumpectomy with XRT, and mastectomy with optional reconstruction, both having the same cure rate. Explained the importance of negative margins, and the need for re-excision in the case of positive margins. Will plan to mobilize breast tissue during lumpectomy to minimize cosmetic defect. Also discussed benefit and technique of SLNBx of 3-4 LNs. This will be an outpatient procedure, with sutures under the skin, waterproof glue over the incision, and expected recovery time of about 1 week.  Pt should avoid any heavy lifting for about 1 week after surgery, and may drive as long as she is not on prescription pain medication.     We also covered risk reduction strategies including XRT, chemotherapy, and hormonal therapy with estrogen blocker. Ordered MammaPrint to help determine whether pt will benefit from chemotherapy. If low risk with negative LNs, chemo may not be recommended. If high risk, will further evaluate need for chemo based on final tumor size and LN involvement. Discussed estrogen blocker for further risk reduction for ER+ disease. Side effects may include hot flashes and joint pain. Will refer to radiation oncology and medical oncology for further consultation. XRT will begin approximately 4 weeks after surgery or chemo. XRT treatments will be 5 days/week, and will last for 4-6 weeks, depending on LN involvement.      We also discussed the pts questions and concerns. Pt had further questions about receptors, which I explained in detail as well as possible outcomes from MammaPrint. We also spoke about survival rates based on MammaPrint. Pt also stated that she may be allergic to Succinylcholine- and advised to make anesthesiology aware.     Pt has elected to proceed with lumpectomy.     Pt should feel free to call Karely Power or Tavares Sanchez, nurse navigators, with any further questions.     Will call to schedule surgery. This plan was reviewed with the patient and patient agrees. All questions were answered.

## 2022-03-31 NOTE — OP NOTES
[address] [facility]  OPERATIVE REPORT    Name:  Bolivar Iverson  MR#:  [id number]  :  1960  ACCOUNT #:  [de-identified]  DATE OF SERVICE:  2022    PREOPERATIVE DIAGNOSIS:  Carcinoma of the left breast.    POSTOPERATIVE DIAGNOSIS:  Carcinoma of the left breast.    PROCEDURE PERFORMED:  Left lumpectomy with Magseed localization and left sentinel node biopsy. SURGEON:  Cassie Larios MD    ASSISTANT:  Marc Call    ANESTHESIA:  General.    COMPLICATIONS:  None. SPECIMENS REMOVED:  Left axillary sentinel lymph nodes and left breast mass. IMPLANTS:  None. ESTIMATED BLOOD LOSS:  Minimal.    INDICATION:  The patient is a 64-year-old female who has a biopsy-proven adenocarcinoma of the left breast.    PROCEDURE:  After lymphoscintigraphy in Radiology and Magseed localization, the patient was brought to the operating room. After satisfactory induction of general LMA anesthesia, the patient was prepped and draped in sterile fashion. An axillary incision was made and deepened through the subcutaneous tissue with Bovie cautery. The axilla was entered, and utilizing a Neoprobe, three sentinel nodes were found in the deep axilla. They were removed and sent for permanent section. All dissection planes were hemostatic. The wound was anesthetized with 0.5% Marcaine. It was closed with an inner layer of 3-0 Vicryl and a running subcuticular 4-0 Monocryl on the skin. Attention was then turned to the breast where a Magseed was used to localize the seed in the upper inner quadrant. A circumareolar incision was made and deepened through the subcutaneous tissue with Bovie cautery. The skin flap was raised superiorly and medially to the area of the seed and clip. A standard lumpectomy was performed down the chest wall and the specimen was removed and Magseed had exuded from the specimen as the anterior margin had been opened slightly.   Anterior margin was reapproximated with a single stitch. The lumpectomy specimen was oriented. Specimen radiograph was obtained which did reveal a presence of the original clip within the specimen. All dissection planes were hemostatic. This specimen was sent to Pathology. Tissue advancement flaps were then created over a distance of 6 x 3 cm medially and laterally for a total tissue advancement of approximately 36 cm2. The parenchymal flaps were reapproximated with interrupted 3-0 Vicryl sutures and separate incisions were made in the anterior aspects of the flap to advance tissue into the lumpectomy defect. The tissue was reapproximated with VeraForm tumor marker suture and then a second parenchymal flap created and retroareolar space was mobilized superiorly and medially and advanced to finish closing down the lumpectomy defect. Subcutaneous tissue was reapproximated with interrupted 3-0 Vicryl and the skin closed with a running subcuticular 4-0 Monocryl. The patient tolerated the procedure well with no immediate complications. She was taken to the recovery room in stable condition.       Percy Fritz MD      [id number]/  D:  03/31/2022 15:48  T:  03/31/2022 17:12  JOB #:  [id number]  CC:  MD Macario Green MD

## 2022-03-31 NOTE — DISCHARGE INSTRUCTIONS
Discharge Instructions from Dr. Aviva Mojica    · I will call you with the pathology results, typically within 1 week from today. · You may shower, but no hot tubs, swimming pools, or baths until your incision is healed. · No heavy lifting with the affected extremity (nothing greater than 5 pounds), and limit its use for the next 4-5 days. · You may use an ice pack for comfort for the next couple of days, but do not place ice directly on the skin. Rather, use a towel or clothing to serve as a barrier between skin and ice to prevent injury. · If I placed a drain, follow the drain instructions provided, especially as you keep a record of the drain output. · Follow medication instructions carefully. · Watch for signs of infection as listed below. · Redness  · Swelling  · Drainage from the incision or from your nipple that appears infected  · Fever over 101 degrees for consecutive readings, or over 99.5 if you are currently undergoing chemotherapy. · Call our office (number is below) for a follow-up appointment. · If you have any problems, our phone number is [phone]. Patient Education   Scopolamine (Absorbed through the skin)   Scopolamine ([id number]-EDI-w-meen)  Treat nausea and vomiting. Brand Name(s): Transderm Scop   There may be other brand names for this medicine. When This Medicine Should Not Be Used: You should not use this medicine if you have had an allergic reaction to scopolamine, or if you have narrow angle glaucoma. How to Use This Medicine:   Patch  · Your doctor will tell you how many patches to use, where to apply them, and how often to apply them. Do not use more patches or apply them more often than your doctor tells you to. · Read and follow the patient instructions that come with this medicine. Talk to your doctor or pharmacist if you have any questions. · To prevent motion sickness, apply the patch at least 4 hours before you need it.   · Wash and dry your hands thoroughly before applying the patch. · Leave the patch in its sealed wrapper until you are ready to put it on. Tear the wrapper open carefully. NEVER CUT the wrapper or the patch with scissors. Do not use any patch that has been cut by accident. · Take the liner off the sticky side before applying. · Apply the patch to dry, hairless skin behind the ear. · If the patch is loose or falls off, apply a new patch at a different place behind the ear. · After you take off the patch, wash the place where the patch was and your hands thoroughly. · Only one patch should be used at any time. If a dose is missed:   · If you forget to wear or change a patch, put one on as soon as you can. If it is almost time to put on your next patch, wait until then to apply a new patch and skip the one you missed. Do not apply extra patches to make up for a missed dose. How to Store and Dispose of This Medicine:   · Store the patches at room temperature in a closed container, away from heat, moisture, and direct light. · Fold the used patch in half with the sticky sides together. Throw any used patch away so that children or pets cannot get to it. You will also need to throw away old patches after the expiration date has passed. · Keep all medicine out of the reach of children. Never share your medicine with anyone. Drugs and Foods to Avoid:   Ask your doctor or pharmacist before using any other medicine, including over-the-counter medicines, vitamins, and herbal products. · Tell your doctor if you use anything else that makes you sleepy. Some examples are allergy medicine, narcotic pain medicine, and alcohol. · Do not drink alcohol while you are using this medicine.   Warnings While Using This Medicine:   · Make sure your doctor knows if you are pregnant or breastfeeding, or if you have glaucoma, prostate problems, trouble urinating, blocked bowels, liver disease, kidney disease, or a history of seizures or mental illness. · This medicine can cause blurring of vision and other vision problems if it comes in contact with the eyes. This medicine may also cause problems with urination. If any of these reactions occur, remove the patch and call your doctor right away. · This medicine may make you dizzy or drowsy. Avoid driving, using machines, or doing anything else that could be dangerous if you are not alert. If you plan to participate in underwater sports, this medicine may cause disorienting effects. If this is a concern for you, talk with your doctor. · This medicine may make you sweat less and cause your body to get too hot. Be careful in hot weather, when you are exercising, or if using a sauna or whirlpool. · Tell any doctor or dentist who treats you that you are using this medicine. This medicine may affect certain medical test results. · Skin burns have been reported at the patch site in several patients wearing an aluminized transdermal system during a magnetic resonance imaging scan (MRI). Because Transderm Sc?p® contains aluminum, it is recommended to remove the system before undergoing an MRI. Possible Side Effects While Using This Medicine:   Call your doctor right away if you notice any of these side effects:  · Allergic reaction: Itching or hives, swelling in your face or hands, swelling or tingling in your mouth or throat, chest tightness, trouble breathing  · Blurred vision. · Confusion or memory loss. · Fast, slow, or uneven heartbeat. · Lightheadedness, dizziness, drowsiness, or fainting. · Seeing, hearing, or feeling things that are not there. · Severe eye pain. · Trouble urinating. If you notice these less serious side effects, talk with your doctor:   · Dry mouth. · Dry, itchy, or red eyes. · Restlessness. · Skin rash or redness. If you notice other side effects that you think are caused by this medicine, tell your doctor. Call your doctor for medical advice about side effects.  You may report side effects to FDA at 4-611-FDA-8580  © 2017 [facility] Information is for End User's use only and may not be sold, redistributed or otherwise used for commercial purposes. The above information is an  only. It is not intended as medical advice for individual conditions or treatments. Talk to your doctor, nurse or pharmacist before following any medical regimen to see if it is safe and effective for you.

## 2022-03-31 NOTE — PERIOP NOTES
I have reviewed discharge instructions with the spouse. The spouse verbalized understanding. All questions addressed at this time. A paper copy of these instructions have been given to the patient to take home.

## 2022-03-31 NOTE — ANESTHESIA PREPROCEDURE EVALUATION
Relevant Problems   GASTROINTESTINAL   (+) GERD (gastroesophageal reflux disease)   (+) Hiatal hernia with GERD and esophagitis      ENDOCRINE   (+) Diabetes mellitus type 2, noninsulin dependent (HCC)      PERSONAL HX & FAMILY HX OF CANCER   (+) Infiltrating ductal carcinoma of left breast (HCC)       Anesthetic History     PONV and pseudocholinesterase deficiency          Review of Systems / Medical History  Patient summary reviewed, nursing notes reviewed and pertinent labs reviewed    Pulmonary  Within defined limits                 Neuro/Psych   Within defined limits           Cardiovascular  Within defined limits                     GI/Hepatic/Renal     GERD           Endo/Other    Diabetes  Hypothyroidism  Cancer     Other Findings              Physical Exam    Airway  Mallampati: II  TM Distance: > 6 cm  Neck ROM: normal range of motion   Mouth opening: Normal     Cardiovascular  Regular rate and rhythm,  S1 and S2 normal,  no murmur, click, rub, or gallop             Dental  No notable dental hx       Pulmonary  Breath sounds clear to auscultation               Abdominal  GI exam deferred       Other Findings            Anesthetic Plan    ASA: 2  Anesthesia type: general          Induction: Intravenous  Anesthetic plan and risks discussed with: Patient

## 2022-04-01 ENCOUNTER — NURSE NAVIGATOR (OUTPATIENT)
Dept: CASE MANAGEMENT | Age: 62
End: 2022-04-01

## 2022-04-01 NOTE — PROGRESS NOTES
[address]  Breast Navigator Encounter    Name:    Yasemin Lo  Age:    59 y.o. Diagnosis:    LEFT breast cancer    Interdisciplinary Team:  Med-Onc:      Surg-Onc:    Dr. Suleiman Alas:      Plastics:      :      Nurse Navigator:  Dipti Madison, RN, BSN, [facility]      Encounter type:  [x]Patient Initiated  []Navigator Follow-up []Pre-op  []Post-op  []Check-in Prior to First Treatment []Treatment Modality Change  []Survivorship Transition []Other:       Narrative:    POD #1 after LEFT breast lumpectomy and SNBX. Doing great today. Had a few questions. · Can she wear a bra at night? I told her this was fine. · Can she use diclofenac gel for some low back pain? I told her this was fine. · Is it okay to shower? I told her yes. · Has dental and derm appts for the week before her post-op check. I told her it was fine to go to both appts. · Has a friend who has offered her [address] to she/her  for a couple of days next week. I told her it was fine to go there to rest and recover. I told her no pool, hot tub or river water. She is fine with this. · I told her it was fine to take short walks and get outside. She was very appreciative of the call back.             Dipti Madison RN, BSN, [facility]  Oncology Breast Navigator     [address]  [address], [place], [address]  W: [phone]  F: [phone]  [email or web address]  Good Help to Those in [facility]

## 2022-04-04 ENCOUNTER — TELEPHONE (OUTPATIENT)
Dept: SURGERY | Age: 62
End: 2022-04-04

## 2022-04-06 DIAGNOSIS — C50.912 MALIGNANT NEOPLASM OF LEFT FEMALE BREAST, UNSPECIFIED ESTROGEN RECEPTOR STATUS, UNSPECIFIED SITE OF BREAST (HCC): Primary | ICD-10-CM

## 2022-04-11 ENCOUNTER — TELEPHONE (OUTPATIENT)
Dept: ENDOCRINOLOGY | Age: 62
End: 2022-04-11

## 2022-04-11 NOTE — TELEPHONE ENCOUNTER
Patient stated she was suppose to have a f/u with Dr. Kimberley Ewing in June but she found out she was not scheduled. She stated she has been scheduled for August and she stated she is suppose to have an U/S prior to her visit. She would like to know if August is okay or is she should be see sooner. FYTAWANDA  She also stated she has been diagnosed with breast cancer and she will be having surgery this week. She will then be having radiation treatment until the middle of June.

## 2022-04-11 NOTE — TELEPHONE ENCOUNTER
4/11/2022  9:47 AM        Pt would like to speak with  nurse to go over some health issues she is having. LR#[phone].     Thanks,  Anna Mary [FreeTextEntry1] : In summary the patient is a 32-year-old male with hypertension status post COVID-19 pneumonia, s/p pulmonary emboli currently on Eliquis and lisinopril who is seen via telemedicine\par Patient has responded well to therapy.  I had a lengthy discussion with the patient regarding continuing his anticoagulation for another month.  The patient will continue current therapy and follow-up in 1 month

## 2022-04-11 NOTE — TELEPHONE ENCOUNTER
4/11/2022  3:14 PM    Patient left voicemail at 1:30. Was returning your call.   [phone]    Thanks,  Sofia Mcmullen

## 2022-04-11 NOTE — TELEPHONE ENCOUNTER
4/11/2022  9:43 AM      Pt stated she should have an ultra sound before her next visit. Pt want to get an earlier appointment. Pt stated she was here in January and was told to come back in 6 months the earliest you have available is in AugustPt would like to know if she can get earlier appointment. YK#[phone].     Thanks,  Gretchen Bojorquez

## 2022-04-12 ENCOUNTER — NURSE NAVIGATOR (OUTPATIENT)
Dept: CASE MANAGEMENT | Age: 62
End: 2022-04-12

## 2022-04-12 NOTE — TELEPHONE ENCOUNTER
Sent her the following message through [employer]:    I received a message you called today and wanted to clarify your concerns. If you reread the [employer] message I sent you on 1/15/22, it states:     TSH is a thyroid test.  Your level is 0.28 which is low and below goal of 0.5-2.0.  This test goes opposite of your thyroid function and suggests you still have mild hyperthyroidism (fast metabolism) but improved from 0.20 in June. As long as your level remains above 0.10, we can follow this over time without treatment. I will order a thyroid ultrasound in June 2022 and will contact you when the order has been placed and will be in touch with the results. If there has been no change in your nodules, then I will just have you follow up with your PCP to check your TSH every 6 months and only come back to see me in the future if your value goes under 0.10.  -------------------------------------------------------------------------------------------------------------------  Therefore, I never gave you an appointment after your visit in January as I simply plan on ordering an ultrasound in June and will contact you over [employer] with the results and if needed, I will schedule another visit. I'm happy to keep the appointment on the books for August for now but will likely cancel this if it's not needed. I'm sorry to hear about the breast cancer diagnosis but pray everything goes smoothly with the surgery and radiation. Let me know if this clarifies your concerns. Take care and hope you are well.

## 2022-04-12 NOTE — PROGRESS NOTES
[address]  Breast Navigator Encounter    Name:    Timbo Sample  Age:    59 y.o. Diagnosis:    LEFT breast cancer    Returned patient's call. She is having a re-excision lumpectomy in two days. Discussed positive margins; explained this as a bulls eye on a dart board, and the patient understood. I explained that it was 2mm of the margin that was + for cancer. It is likely that there is no cancer remaining or a small amount. Discussed that Dr. Era Mcgowan will use the same incision and take a little more tissue. Provided reassurance that this procedure is usually very well tolerated and easier than the first lumpectomy. Encouraged her to keep an appt that she has with a dermatologist for next week. She has a beach vacation at the end of June. Would like to have XRT completed prior to that. I told her to discuss this with the rad onc on her first visit so they are aware of this. She will do that. I wished her good luck next week! I will continue to follow her.             Wilfred Gonzales RN, BSN, [facility]  Oncology Breast Navigator     [address]  [address] [place] [address], [address]  W: [phone]  F: [phone]  [email or web address]  Good Help to Those in [facility]

## 2022-04-14 ENCOUNTER — HOSPITAL ENCOUNTER (OUTPATIENT)
Age: 62
Setting detail: OUTPATIENT SURGERY
Discharge: HOME | End: 2022-04-14
Attending: SURGERY | Admitting: SURGERY
Payer: COMMERCIAL

## 2022-04-14 ENCOUNTER — ANESTHESIA EVENT (OUTPATIENT)
Dept: MEDSURG UNIT | Age: 62
End: 2022-04-14
Payer: COMMERCIAL

## 2022-04-14 ENCOUNTER — ANESTHESIA (OUTPATIENT)
Dept: MEDSURG UNIT | Age: 62
End: 2022-04-14
Payer: COMMERCIAL

## 2022-04-14 VITALS
TEMPERATURE: 97.7 F | DIASTOLIC BLOOD PRESSURE: 59 MMHG | RESPIRATION RATE: 12 BRPM | BODY MASS INDEX: 30.92 KG/M2 | OXYGEN SATURATION: 94 % | HEART RATE: 86 BPM | WEIGHT: 197 LBS | HEIGHT: 67 IN | SYSTOLIC BLOOD PRESSURE: 111 MMHG

## 2022-04-14 DIAGNOSIS — C50.912 MALIGNANT NEOPLASM OF LEFT FEMALE BREAST, UNSPECIFIED ESTROGEN RECEPTOR STATUS, UNSPECIFIED SITE OF BREAST (HCC): ICD-10-CM

## 2022-04-14 LAB
GLUCOSE BLD STRIP.AUTO-MCNC: 106 MG/DL (ref 65–117)
GLUCOSE BLD STRIP.AUTO-MCNC: 116 MG/DL (ref 65–117)
SERVICE CMNT-IMP: NORMAL
SERVICE CMNT-IMP: NORMAL

## 2022-04-14 PROCEDURE — 74011250636 HC RX REV CODE- 250/636: Performed by: NURSE ANESTHETIST, CERTIFIED REGISTERED

## 2022-04-14 PROCEDURE — 76030000000 HC AMB SURG OR TIME 0.5 TO 1: Performed by: SURGERY

## 2022-04-14 PROCEDURE — 76060000061 HC AMB SURG ANES 0.5 TO 1 HR: Performed by: SURGERY

## 2022-04-14 PROCEDURE — 77030040361 HC SLV COMPR DVT MDII -B: Performed by: SURGERY

## 2022-04-14 PROCEDURE — 77030041680 HC PNCL ELECSURG SMK EVAC CNMD -B: Performed by: SURGERY

## 2022-04-14 PROCEDURE — 82962 GLUCOSE BLOOD TEST: CPT

## 2022-04-14 PROCEDURE — 77030010509 HC AIRWY LMA MSK TELE -A: Performed by: NURSE ANESTHETIST, CERTIFIED REGISTERED

## 2022-04-14 PROCEDURE — 77030002933 HC SUT MCRYL J&J -A: Performed by: SURGERY

## 2022-04-14 PROCEDURE — 74011000250 HC RX REV CODE- 250: Performed by: NURSE ANESTHETIST, CERTIFIED REGISTERED

## 2022-04-14 PROCEDURE — 88307 TISSUE EXAM BY PATHOLOGIST: CPT

## 2022-04-14 PROCEDURE — 76210000035 HC AMBSU PH I REC 1 TO 1.5 HR: Performed by: SURGERY

## 2022-04-14 PROCEDURE — 2709999900 HC NON-CHARGEABLE SUPPLY: Performed by: SURGERY

## 2022-04-14 PROCEDURE — 77030031139 HC SUT VCRL2 J&J -A: Performed by: SURGERY

## 2022-04-14 PROCEDURE — 77030008552 HC TBNG SMK EVAC BFLF -A: Performed by: SURGERY

## 2022-04-14 PROCEDURE — 74011000250 HC RX REV CODE- 250: Performed by: SURGERY

## 2022-04-14 PROCEDURE — 74011250636 HC RX REV CODE- 250/636: Performed by: ANESTHESIOLOGY

## 2022-04-14 PROCEDURE — 77030011267 HC ELECTRD BLD COVD -A: Performed by: SURGERY

## 2022-04-14 PROCEDURE — 77030010507 HC ADH SKN DERMBND J&J -B: Performed by: SURGERY

## 2022-04-14 PROCEDURE — 77030002996 HC SUT SLK J&J -A: Performed by: SURGERY

## 2022-04-14 PROCEDURE — 74011250636 HC RX REV CODE- 250/636: Performed by: SURGERY

## 2022-04-14 PROCEDURE — 77030040922 HC BLNKT HYPOTHRM STRY -A

## 2022-04-14 PROCEDURE — 74011250637 HC RX REV CODE- 250/637: Performed by: ANESTHESIOLOGY

## 2022-04-14 RX ORDER — SODIUM CHLORIDE 0.9 % (FLUSH) 0.9 %
5-40 SYRINGE (ML) INJECTION AS NEEDED
Status: DISCONTINUED | OUTPATIENT
Start: 2022-04-14 | End: 2022-04-14 | Stop reason: HOSPADM

## 2022-04-14 RX ORDER — FENTANYL CITRATE 50 UG/ML
INJECTION, SOLUTION INTRAMUSCULAR; INTRAVENOUS AS NEEDED
Status: DISCONTINUED | OUTPATIENT
Start: 2022-04-14 | End: 2022-04-14 | Stop reason: HOSPADM

## 2022-04-14 RX ORDER — MORPHINE SULFATE 2 MG/ML
2 INJECTION, SOLUTION INTRAMUSCULAR; INTRAVENOUS
Status: CANCELLED | OUTPATIENT
Start: 2022-04-14

## 2022-04-14 RX ORDER — ONDANSETRON 2 MG/ML
INJECTION INTRAMUSCULAR; INTRAVENOUS AS NEEDED
Status: DISCONTINUED | OUTPATIENT
Start: 2022-04-14 | End: 2022-04-14 | Stop reason: HOSPADM

## 2022-04-14 RX ORDER — LIDOCAINE HYDROCHLORIDE AND EPINEPHRINE 10; 10 MG/ML; UG/ML
30 INJECTION, SOLUTION INFILTRATION; PERINEURAL ONCE
Status: COMPLETED | OUTPATIENT
Start: 2022-04-14 | End: 2022-04-14

## 2022-04-14 RX ORDER — LIDOCAINE HYDROCHLORIDE 10 MG/ML
0.1 INJECTION, SOLUTION EPIDURAL; INFILTRATION; INTRACAUDAL; PERINEURAL AS NEEDED
Status: DISCONTINUED | OUTPATIENT
Start: 2022-04-14 | End: 2022-04-14 | Stop reason: HOSPADM

## 2022-04-14 RX ORDER — ACETAMINOPHEN 325 MG/1
650 TABLET ORAL ONCE
Status: COMPLETED | OUTPATIENT
Start: 2022-04-14 | End: 2022-04-14

## 2022-04-14 RX ORDER — FENTANYL CITRATE 50 UG/ML
25 INJECTION, SOLUTION INTRAMUSCULAR; INTRAVENOUS
Status: CANCELLED | OUTPATIENT
Start: 2022-04-14

## 2022-04-14 RX ORDER — LIDOCAINE HYDROCHLORIDE 20 MG/ML
INJECTION, SOLUTION EPIDURAL; INFILTRATION; INTRACAUDAL; PERINEURAL AS NEEDED
Status: DISCONTINUED | OUTPATIENT
Start: 2022-04-14 | End: 2022-04-14 | Stop reason: HOSPADM

## 2022-04-14 RX ORDER — MIDAZOLAM HYDROCHLORIDE 1 MG/ML
INJECTION, SOLUTION INTRAMUSCULAR; INTRAVENOUS AS NEEDED
Status: DISCONTINUED | OUTPATIENT
Start: 2022-04-14 | End: 2022-04-14 | Stop reason: HOSPADM

## 2022-04-14 RX ORDER — SODIUM CHLORIDE 0.9 % (FLUSH) 0.9 %
5-40 SYRINGE (ML) INJECTION EVERY 8 HOURS
Status: CANCELLED | OUTPATIENT
Start: 2022-04-14

## 2022-04-14 RX ORDER — ONDANSETRON 2 MG/ML
4 INJECTION INTRAMUSCULAR; INTRAVENOUS AS NEEDED
Status: CANCELLED | OUTPATIENT
Start: 2022-04-14

## 2022-04-14 RX ORDER — SODIUM CHLORIDE, SODIUM LACTATE, POTASSIUM CHLORIDE, CALCIUM CHLORIDE 600; 310; 30; 20 MG/100ML; MG/100ML; MG/100ML; MG/100ML
INJECTION, SOLUTION INTRAVENOUS
Status: DISCONTINUED | OUTPATIENT
Start: 2022-04-14 | End: 2022-04-14 | Stop reason: HOSPADM

## 2022-04-14 RX ORDER — SCOLOPAMINE TRANSDERMAL SYSTEM 1 MG/1
1 PATCH, EXTENDED RELEASE TRANSDERMAL
Status: DISCONTINUED | OUTPATIENT
Start: 2022-04-14 | End: 2022-04-14 | Stop reason: HOSPADM

## 2022-04-14 RX ORDER — SODIUM CHLORIDE 0.9 % (FLUSH) 0.9 %
5-40 SYRINGE (ML) INJECTION EVERY 8 HOURS
Status: DISCONTINUED | OUTPATIENT
Start: 2022-04-14 | End: 2022-04-14 | Stop reason: HOSPADM

## 2022-04-14 RX ORDER — SODIUM CHLORIDE 0.9 % (FLUSH) 0.9 %
5-40 SYRINGE (ML) INJECTION AS NEEDED
Status: CANCELLED | OUTPATIENT
Start: 2022-04-14

## 2022-04-14 RX ORDER — SODIUM CHLORIDE, SODIUM LACTATE, POTASSIUM CHLORIDE, CALCIUM CHLORIDE 600; 310; 30; 20 MG/100ML; MG/100ML; MG/100ML; MG/100ML
50 INJECTION, SOLUTION INTRAVENOUS CONTINUOUS
Status: DISCONTINUED | OUTPATIENT
Start: 2022-04-14 | End: 2022-04-14 | Stop reason: HOSPADM

## 2022-04-14 RX ORDER — HYDROMORPHONE HYDROCHLORIDE 1 MG/ML
0.2 INJECTION, SOLUTION INTRAMUSCULAR; INTRAVENOUS; SUBCUTANEOUS
Status: CANCELLED | OUTPATIENT
Start: 2022-04-14

## 2022-04-14 RX ORDER — OXYCODONE HYDROCHLORIDE 5 MG/1
5 TABLET ORAL
Status: CANCELLED | OUTPATIENT
Start: 2022-04-14

## 2022-04-14 RX ORDER — BUPIVACAINE HYDROCHLORIDE AND EPINEPHRINE 5; 5 MG/ML; UG/ML
30 INJECTION, SOLUTION EPIDURAL; INTRACAUDAL; PERINEURAL ONCE
Status: COMPLETED | OUTPATIENT
Start: 2022-04-14 | End: 2022-04-14

## 2022-04-14 RX ORDER — PROPOFOL 10 MG/ML
INJECTION, EMULSION INTRAVENOUS AS NEEDED
Status: DISCONTINUED | OUTPATIENT
Start: 2022-04-14 | End: 2022-04-14 | Stop reason: HOSPADM

## 2022-04-14 RX ADMIN — ACETAMINOPHEN 650 MG: 325 TABLET ORAL at 13:27

## 2022-04-14 RX ADMIN — MIDAZOLAM 2 MG: 1 INJECTION INTRAMUSCULAR; INTRAVENOUS at 14:16

## 2022-04-14 RX ADMIN — SODIUM CHLORIDE, POTASSIUM CHLORIDE, SODIUM LACTATE AND CALCIUM CHLORIDE: 600; 310; 30; 20 INJECTION, SOLUTION INTRAVENOUS at 15:05

## 2022-04-14 RX ADMIN — SODIUM CHLORIDE, POTASSIUM CHLORIDE, SODIUM LACTATE AND CALCIUM CHLORIDE: 600; 310; 30; 20 INJECTION, SOLUTION INTRAVENOUS at 14:14

## 2022-04-14 RX ADMIN — SODIUM CHLORIDE, POTASSIUM CHLORIDE, SODIUM LACTATE AND CALCIUM CHLORIDE 50 ML/HR: 600; 310; 30; 20 INJECTION, SOLUTION INTRAVENOUS at 13:51

## 2022-04-14 RX ADMIN — ONDANSETRON HYDROCHLORIDE 4 MG: 2 INJECTION, SOLUTION INTRAMUSCULAR; INTRAVENOUS at 14:48

## 2022-04-14 RX ADMIN — WATER 2 G: 1 INJECTION INTRAMUSCULAR; INTRAVENOUS; SUBCUTANEOUS at 14:32

## 2022-04-14 RX ADMIN — FENTANYL CITRATE 50 MCG: 50 INJECTION, SOLUTION INTRAMUSCULAR; INTRAVENOUS at 14:20

## 2022-04-14 RX ADMIN — PROPOFOL 200 MG: 10 INJECTION, EMULSION INTRAVENOUS at 14:20

## 2022-04-14 RX ADMIN — LIDOCAINE HYDROCHLORIDE 60 MG: 20 INJECTION, SOLUTION EPIDURAL; INFILTRATION; INTRACAUDAL; PERINEURAL at 14:20

## 2022-04-14 NOTE — ANESTHESIA POSTPROCEDURE EVALUATION
Post-Anesthesia Evaluation and Assessment    Patient: Ethan Ledesma MRN: [id number]  SSN: [id number]    YOB: 1960  Age: 59 y.o. Sex: female      I have evaluated the patient and they are stable and ready for discharge from the PACU. Cardiovascular Function/Vital Signs  Visit Vitals  BP (!) 111/59   Pulse 86   Temp 36.5 °C (97.7 °F)   Resp 12   Ht 5' 7\" (1.702 m)   Wt 89.4 kg (197 lb)   SpO2 94%   BMI 30.85 kg/m²       Patient is status post General anesthesia for Procedure(s):  RE EXCISION LATERAL MARGIN LEFT BREAST LUMPECTOMY. Nausea/Vomiting: None    Postoperative hydration reviewed and adequate. Pain:  Pain Scale 1: Numeric (0 - 10) (04/14/22 1518)  Pain Intensity 1: 0 (04/14/22 1518)   Managed    Neurological Status:   Neuro (WDL): Within Defined Limits (04/14/22 1518)  Neuro  Neurologic State: Alert (encouraged to nap) (04/14/22 1518)  LUE Motor Response: Purposeful (04/14/22 1518)  LLE Motor Response: Purposeful (04/14/22 1518)  RUE Motor Response: Purposeful (04/14/22 1518)  RLE Motor Response: Purposeful (04/14/22 1518)   At baseline    Mental Status, Level of Consciousness: Alert and  oriented to person, place, and time    Pulmonary Status:   O2 Device: Nasal cannula (04/14/22 1518)   Adequate oxygenation and airway patent    Complications related to anesthesia: None    Post-anesthesia assessment completed. No concerns    Signed By: Reji Whitlock MD     April 14, 2022              Procedure(s):  RE EXCISION LATERAL MARGIN LEFT BREAST LUMPECTOMY. general    <BSHSIANPOST>    INITIAL Post-op Vital signs:   Vitals Value Taken Time   /59 04/14/22 1545   Temp 36.5 °C (97.7 °F) 04/14/22 1518   Pulse 87 04/14/22 1548   Resp 20 04/14/22 1548   SpO2 95 % 04/14/22 1548   Vitals shown include unvalidated device data.

## 2022-04-14 NOTE — H&P
HISTORY OF PRESENT ILLNESS  Breezy Sauceda is a 59 y.o. female.     HPI  NEW patient consult referred by  Dr.Tamo Anish No for new breast cancer diagnosis. Pt presents for discussion about treatment. Denies pain or changes to the breast area.      Family History:  No history of breast or ovarian     Breast imaging-  Mammogram, 1/4/22 [facility], [facility] 4  MRI Results (most recent):  Results from Hospital Encounter encounter on 02/22/22     MRI BREAST BI W WO CONT     Narrative  HISTORY: 68-year-old female with newly diagnosed left breast cancer, presenting  for preoperative breast MRI.     COMPARISON: Mammography from February and January 2022, as well as December 2021. No prior MRI.     TECHNIQUE:  Bilateral breast MRI was performed using a dedicated breast coil without  compression with the patient in the prone position. Precontrast T1-weighted  images with fat suppression were obtained followed by bolus injection of 9 mL  Gadavist. Postcontrast dynamic and high-resolution images were acquired. T2-weighted axial imaging with fat suppression was also performed. The images  were analyzed using CAD analysis, enhancement curves, digital subtraction, and 2  and 3 dimensional reconstructions.     FINDINGS:     Background parenchymal enhancement: Mild     Mammographic breast density: Scattered fibroglandular breast tissue     Right breast:  There is no suspicious mass or mass enhancement within the right breast. There  is no skin thickening or nipple retraction.     There is no suspicious axillary or internal mammary chain lymphadenopathy.     Left breast:  Within the middle third of the left breast upper inner quadrant, there is  evidence of a biopsy clip, and associated biopsy tract, and ill-defined  malignant enhancement measuring up to 1 x 1.2 x 1.2 cm. There is mixed internal  kinetics. This is consistent with newly diagnosed breast carcinoma.  There is no  other suspicious enhancement within the left breast. There is no evidence of  multifocal or multicentric disease. There is no skin thickening or nipple  retraction.     There is no suspicious axillary or internal mammary chain lymphadenopathy.     Impression  Right Breast:  1. BI-RADS Assessment Category 1: Negative. No evidence of breast carcinoma  within the right breast.     Left Breast:  1. BI-RADS Assessment Category 6: Known biopsy proven malignancy- Appropriate  action should be taken. 1.2 cm of malignant masslike enhancement and associated  biopsy clip, located in the middle third of the left breast upper inner  quadrant, consistent with newly diagnosed breast carcinoma. 2. No evidence of multicentric or multifocal disease. 3. No suspicious lymphadenopathy.     RECOMMENDATIONS:  The newly diagnosed left breast malignancy appears amenable to breast  conservation therapy. There is no evidence of multifocal, multicentric, or  contralateral disease.     A summary portfolio has been created in PACS.       02/02/22: LEFT breast bx. PATH: IDC, largest focus measures 10mm, low grade, ER+(99%)/GA+(45%)/HER2-, Ki-67 15%. Clinical stage 1.  · MammaPrint pending.              Past Medical History:   Diagnosis Date    Bone spur of ankle 2018    Bone spurs of feet  2/24/2017     Podiatry Dr Cierra Dickson    Diabetes mellitus type 2, noninsulin dependent ([facility] [address] 12/10/2015     3/2001    Diverticulosis age 26    FH: melanoma       also FH: [facility]    Gestational diabetes 1998    Heel spur, left 9/19/2018 2/2018, Dr Mendel Cline Hemorrhoids      Hiatal hernia with GERD and esophagitis 3/26/2020     EGD 2016    Hiatal Hernia, Gastritis, mild; BX neg for H. Pylori 4/2010 4/27/2010    History of abnormal Pap smear, s/p Cryotherapy in her 30's 12/5/2012    History of SCC (squamous cell carcinoma) of skin 1/9/2019     Skin checks and cancer screenings per [place].  Dr. Dmitri Bangura Hx of colonoscopy 7/11/2016     [facility] Dr. Edwar Nelson Left foot pain 2009; s/p CSI 2010    Left sided sciatica 2012    Meniere's disease      Mixed hyperlipidemia 3/26/2020    Multinodular goiter 2022 Endo Dr. Frances Stafford, LMP age ~ 43 yo, No HRT 2012    Right knee pain; damage at medial meniscus; 2010    S/p colonoscopy with polypectomy 2010    Stress fracture foot       left    Stress incontinence 2012    Tendonitis, Achilles, left 2019     CSI Dr Emmett Barnes     Tendonitis, Achilles, right 2018: Dr Emmett Barnes, bone spur chipped    Uterine fibroid      Vitamin D deficiency 2013                Past Surgical History:   Procedure Laterality Date    HX BREAST BIOPSY Right 2014     Benign, Isma Burris    HX BREAST LUMPECTOMY         benign fibroadenoma right breast    HX  SECTION        Emergency for fetal distress    HX COLONOSCOPY   ~     Dr Alli Beaver; Normal except Diverticulosis, but f/u 5 yrs d/t Fhx    HX COLONOSCOPY   2016     Polyps, Multiple Diverticulae, Internal Hemorrhoids; f/u 5 yrs, Dr Yenny Parker HX COLONOSCOPY   2021     Diverticulosis, incomplete study due to poor prep, repeat 1 yr    HX DILATION AND CURETTAGE   10/8/14     AUB & Uterine Polyp    HX ENDOSCOPY   2016     EGD: Hiatal Hernia, Grade 1 Esophagitis w/ reflux, Gastritis,  Dr Yenny Parker HX ENDOSCOPY   2021    HX LAP CHOLECYSTECTOMY   1998     gallstones    HX SKIN BIOPSY   ~     SCC excised from chest wall, Dr. Francisco Campos HYSTEROSCOPY,W/ENDO [address]   2014     AUB;  Negative         Social History            Socioeconomic History    Marital status:        Spouse name: Not on file    Number of children: 3    Years of education: Not on file    Highest education level: Not on file   Occupational History    Occupation: Former Administrative work at [employer] Occupation: Works for the [employer] in [employer] Occupation: Plans to possibly retire 2022   Tobacco Use    Smoking status: Former Smoker       Packs/day: 1.00       Years: 10.00       Pack years: 10.00       Types: Cigarettes       Quit date: 1991       Years since quittin.8    Smokeless tobacco: Never Used   Vaping Use    Vaping Use: Never used   Substance and Sexual Activity    Alcohol use: Yes       Comment: 2-3 glasses wine 5 days a week    Drug use: No    Sexual activity: Yes       Partners: Male   Other Topics Concern     Service Not Asked    Blood Transfusions Not Asked    Caffeine Concern No       Comment: occ coffee, mostly just on weekends    Occupational Exposure Not Asked    Hobby Hazards Not Asked    Sleep Concern Not Asked    Stress Concern Not Asked    Weight Concern Yes       Comment: happy her wt is coming down w/ eating healthier and being more active    Special Diet Yes       Comment: cut back on sugars and making healthier food choices    Back Care Not Asked    Exercise No       Comment: nothing regular right now    Bike Helmet Not Asked    Seat Belt Not Asked    Self-Exams Not Asked   Social History Narrative     Initial PPV23  age 59; will need PCV 13 at age 71 yo and PPSV 21 again at 76 yo           Diet: Noom Diet since      Exercise: since  walks 3-5 x a week x 20 minutes     Caffeine: 2-3 cups of coffee a day     Weight: happy her wt has been gradually coming down w/ eating healthier over the years and increased exercise the past few months; down from the 240 lb range in 2019 to 203 now           Lives in the [place] [id number] with . Has 2 sons and 1 daughter. Works for the [employer] [employer] [place] [employer] [place]   Likes to garden and music, theater and reading.              Social Determinants of Health          Financial Resource Strain:     Difficulty of Paying Living Expenses: Not on file   Food Insecurity:     Worried About Running Out of Food in the Last Year: Not on file    Tiarra of Food in the Last Year: Not on file   Transportation Needs:     Lack of Transportation (Medical): Not on file    Lack of Transportation (Non-Medical):  Not on file   Physical Activity:     Days of Exercise per Week: Not on file    Minutes of Exercise per Session: Not on file   Stress:     Feeling of Stress : Not on file   Social Connections:     Frequency of Communication with Friends and Family: Not on file    Frequency of Social Gatherings with Friends and Family: Not on file    Attends Jehovah's witness Services: Not on file    Active Member of Clubs or Organizations: Not on file    Attends Club or Organization Meetings: Not on file    Marital Status: Not on file   Intimate Partner Violence:     Fear of Current or Ex-Partner: Not on file    Emotionally Abused: Not on file    Physically Abused: Not on file    Sexually Abused: Not on file   Housing Stability:     Unable to Pay for Housing in the Last Year: Not on file    Number of Places Lived in the Last Year: Not on file    Unstable Housing in the Last Year: Not on file                Current Outpatient Medications on File Prior to Visit   Medication Sig Dispense Refill    Victoza 3-Manolo 0.6 mg/0.1 mL (18 mg/3 mL) pnij INJECT 1.8 MG (0.3 ML) SUBCUTANEOUSLY DAILY 3 mL 3    Synjardy XR 10-1,000 mg TBph TAKE 1 TABLET BY MOUTH EVERY DAY IN THE MORNING WITH BREAKFAST 90 Each 0    rosuvastatin (CRESTOR) 10 mg tablet TAKE 1 TABLET BY MOUTH EVERY DAY AT NIGHT 90 Tablet 0    OTHER Indications: FLUOROUCIL/SALICYLIC ACID (ALT) To remove warts        fluticasone propionate (FLONASE NA) by Nasal route.        glucose blood VI test strips (Accu-Chek Nayeli Plus test strp) strip PER INSURANCE PREFERENCE, TEST 1X/D DX DIABETES E11.9 100 Strip 3    cholecalciferol (VITAMIN D3) (2,000 UNITS /50 MCG) cap capsule Take 2,000 Units by mouth daily.        TURMERIC PO Take  by mouth daily.        melatonin 3 mg tablet Take 3 mg by mouth nightly.        krill-om-3-dha-epa-phospho-ast (MEGARED OMEGA-3 KRILL OIL) 1,000-230-60 mg cap Take 1 Cap by mouth daily.        pen needle, diabetic (NovoTwist) 32 gauge x 1/\" ndle USE ONCE DAILY (Patient not taking: Reported on 2022) 100 Pen Needle 3      No current facility-administered medications on file prior to visit.               Allergies   Allergen Reactions    Succinylcholine Other (comments)       Prolonged period of recovery following anesthesia    Glipizide Other (comments)       Frequent hypoglycemia    Other Medication Diarrhea       Intolerant of >1000 mg of Metformin    Phentermine Other (comments)       Dizziness and \"crashes\"                  OB History         3    Para   3    Term                AB        Living            SAB        IAB        Ectopic        Molar        Multiple        Live Births               Obstetric Comments   Menarche 6, LMP , # of children 1, age of 4st delivery 27, Hysterectomy/oophorectomy No/No, Breast bx Yes, history of breast feeding No, BCP Yes, Hormone therapy No     Emergency  x 1; vaginal deliveries x 2; Previous Gyn: Daria Carrel at a  [facility]; new Gyn Dr Marcela Griggs (retired), changing to [facility] Gyn at [facility] ; Dr. Lillian LOMBARDO: Positive for tinnitus.    Gastrointestinal: Positive for constipation. Musculoskeletal: Positive for back pain. Psychiatric/Behavioral: The patient has insomnia.    All other systems reviewed and are negative.        Physical Exam  Exam conducted with a chaperone present. Cardiovascular:      Rate and Rhythm: Normal rate and regular rhythm. Heart sounds: Normal heart sounds. Pulmonary:      Breath sounds: Normal breath sounds.    Chest:   Breasts: Breasts are symmetrical.      Right: Normal. No swelling, bleeding, inverted nipple, mass, nipple discharge, skin change, tenderness, axillary adenopathy or supraclavicular adenopathy. Left: Normal. No swelling, bleeding, inverted nipple, mass, nipple discharge, skin change, tenderness, axillary adenopathy or supraclavicular adenopathy.         Lymphadenopathy:      Cervical:      Right cervical: No superficial, deep or posterior cervical adenopathy. Left cervical: No superficial, deep or posterior cervical adenopathy. Upper Body:      Right upper body: No supraclavicular or axillary adenopathy. Left upper body: No supraclavicular or axillary adenopathy.       ASSESSMENT and PLAN      ICD-10-CM ICD-9-CM     1. Infiltrating ductal carcinoma of left breast (HCC)  C50.912 174. 9        New pt presents for consultation for treatment of LEFT breast IDC, ER+(99%)/MT+(45%)/HER2-, Ki-67 15%, clinical stage 1. Physical exam today normal. Will plan for wire loc or Magseed loc prior to surgery to localize biopsy clips.      We had a long discussion of options for treatment. The patient was accompanied by  during this encounter, and over half the time was spent on counseling and coordination of care. We discussed in depth the pathology and MRI results, and the need for treatment for extent of disease and surgical planning. The goals of treatment are to treat the breast, and to reduce risk of local or distant recurrence.     Discussed treatment options with risks, complications, benefits, and limitations, including lumpectomy with XRT, and mastectomy with optional reconstruction, both having the same cure rate. Explained the importance of negative margins, and the need for re-excision in the case of positive margins. Will plan to mobilize breast tissue during lumpectomy to minimize cosmetic defect. Also discussed benefit and technique of SLNBx of 3-4 LNs. This will be an outpatient procedure, with sutures under the skin, waterproof glue over the incision, and expected recovery time of about 1 week.  Pt should avoid any heavy lifting for about 1 week after surgery, and may drive as long as she is not on prescription pain medication.     We also covered risk reduction strategies including XRT, chemotherapy, and hormonal therapy with estrogen blocker. Ordered MammaPrint to help determine whether pt will benefit from chemotherapy. If low risk with negative LNs, chemo may not be recommended. If high risk, will further evaluate need for chemo based on final tumor size and LN involvement. Discussed estrogen blocker for further risk reduction for ER+ disease. Side effects may include hot flashes and joint pain. Will refer to radiation oncology and medical oncology for further consultation. XRT will begin approximately 4 weeks after surgery or chemo. XRT treatments will be 5 days/week, and will last for 4-6 weeks, depending on LN involvement.      We also discussed the pts questions and concerns. Pt had further questions about receptors, which I explained in detail as well as possible outcomes from MammaPrint. We also spoke about survival rates based on MammaPrint.  Pt also stated that she may be allergic to Succinylcholine- and advised to make anesthesiology aware.

## 2022-04-14 NOTE — DISCHARGE INSTRUCTIONS
Discharge Instructions from Dr. Claudell Gilmore    · I will call you with the pathology results, typically within 1 week from today. · You may shower, but no hot tubs, swimming pools, or baths until your incision is healed. · No heavy lifting with the affected extremity (nothing greater than 5 pounds), and limit its use for the next 4-5 days. · You may use an ice pack for comfort for the next couple of days, but do not place ice directly on the skin. Rather, use a towel or clothing to serve as a barrier between skin and ice to prevent injury. · If I placed a drain, follow the drain instructions provided, especially as you keep a record of the drain output. · Follow medication instructions carefully. · Watch for signs of infection as listed below. · Redness  · Swelling  · Drainage from the incision or from your nipple that appears infected  · Fever over 101 degrees for consecutive readings, or over 99.5 if you are currently undergoing chemotherapy. · Call our office (number is below) for a follow-up appointment. · If you have any problems, our phone number is [phone]. SCOPOLAMINE PATCH    You have a scopolamine patch behind your LEFT ear. This is to help prevent nausea. This patch can be worn for up to 3 days. The most common side effects are dry mouth/dry eyes. If you are not experiencing nausea and are experiencing side effects you may remove the patch sooner. Please remove the patch no later than 3 days. Be sure to dispose of patch where children or pets cannot access it, wash your hands thoroughly, and do not touch your eyes.       DISCHARGE SUMMARY from Nurse  POST ANESTHESIA INSTRUCTIONS    PATIENT INSTRUCTIONS:    After general anesthesia or intravenous sedation, for 24 hours or while taking prescription Narcotics:  · Limit your activities  · Do not drive and operate hazardous machinery  · Do not make important personal or business decisions  · Do  not drink alcoholic beverages  · If you have not urinated within 8 hours after discharge, please contact your surgeon on call. Report the following to your surgeon:  · Excessive pain, swelling, redness or odor of or around the surgical area  · Temperature over 100.5  · Nausea and vomiting lasting longer than 4 hours or if unable to take medications  · Any signs of decreased circulation or nerve impairment to extremity: change in color, persistent  numbness, tingling, coldness or increase pain  · Any questions    What to do at Home:      If you have any concerns, please call Dr Edgardo Ramos    *  Please give a list of your current medications to your Primary Care Provider. *  Please update this list whenever your medications are discontinued, doses are      changed, or new medications (including over-the-counter products) are added. *  Please carry medication information at all times in case of emergency situations. These are general instructions for a healthy lifestyle:    No smoking/ No tobacco products/ Avoid exposure to second hand smoke  Surgeon General's Warning:  Quitting smoking now greatly reduces serious risk to your health. Obesity, smoking, and sedentary lifestyle greatly increases your risk for illness    A healthy diet, regular physical exercise & weight monitoring are important for maintaining a healthy lifestyle    You may be retaining fluid if you have a history of heart failure or if you experience any of the following symptoms:  Weight gain of 3 pounds or more overnight or 5 pounds in a week, increased swelling in our hands or feet or shortness of breath while lying flat in bed. Please call your doctor as soon as you notice any of these symptoms; do not wait until your next office visit. The discharge information has been reviewed with the patient and caregiver. The patient and caregiver verbalized understanding.   Discharge medications reviewed with the patient and caregiver and appropriate educational materials and side effects teaching were provided.   ___________________________________________________________________________________________________________________________________

## 2022-04-14 NOTE — ANESTHESIA PREPROCEDURE EVALUATION
Relevant Problems   No relevant active problems       Anesthetic History     PONV and pseudocholinesterase deficiency          Review of Systems / Medical History  Patient summary reviewed, nursing notes reviewed and pertinent labs reviewed    Pulmonary                   Neuro/Psych              Cardiovascular                       GI/Hepatic/Renal     GERD           Endo/Other    Diabetes  Hypothyroidism       Other Findings              Physical Exam    Airway  Mallampati: I  TM Distance: > 6 cm  Neck ROM: normal range of motion   Mouth opening: Normal     Cardiovascular    Rhythm: regular           Dental  No notable dental hx       Pulmonary  Breath sounds clear to auscultation               Abdominal         Other Findings            Anesthetic Plan    ASA: 2  Anesthesia type: general          Induction: Inhalational  Anesthetic plan and risks discussed with: Patient

## 2022-04-14 NOTE — BRIEF OP NOTE
Brief Postoperative Note    Patient: Ziggy Shirley  YOB: 1960  MRN: [id number]    Date of Procedure: 4/14/2022     Pre-Op Diagnosis: LEFT BREAST CANCER    Post-Op Diagnosis: Same as preoperative diagnosis.       Procedure(s):  RE EXCISION LATERAL MARGIN LEFT BREAST LUMPECTOMY    Surgeon(s):  Fede Shah MD    Surgical Assistant: Surg Asst-1: Caryle Pale A    Anesthesia: General     Estimated Blood Loss (mL): Minimal    Complications: None    Specimens:   ID Type Source Tests Collected by Time Destination   1 : LEFT BREAST LUMPECTOMY RE-EXCISION LATERAL MARGIN Fresh Breast  Fede Shah MD 4/14/2022 1445 Pathology        Implants: * No implants in log *    Drains: * No LDAs found *    Findings: reexcision of lateral margin    Electronically Signed by Laura Lau MD on 7/99/1571 at 3:00 PM

## 2022-04-15 ENCOUNTER — DOCUMENTATION ONLY (OUTPATIENT)
Dept: SURGERY | Age: 62
End: 2022-04-15

## 2022-04-15 NOTE — OP NOTES
[facility]  OPERATIVE REPORT    Name:  Anali Serrano  MR#:  [id number]  :  1960  ACCOUNT #:  [de-identified]  DATE OF SERVICE:  2022    PREOPERATIVE DIAGNOSIS:  Carcinoma of the left breast, status post lumpectomy with positive lateral margin. POSTOPERATIVE DIAGNOSIS:  Carcinoma of the left breast, status post lumpectomy with positive lateral margin. PROCEDURE PERFORMED:  Re-excision of lateral margin of left lumpectomy. SURGEON:  Rishabh Green MD    ASSISTANT:  Arias Holden. ANESTHESIA:  General.    COMPLICATIONS:  None. SPECIMENS REMOVED:  Lateral margin, left lumpectomy. IMPLANTS:  None. ESTIMATED BLOOD LOSS:  Minimal.    INDICATIONS:  The patient is a 58-year-old female with above diagnosis. PROCEDURE:  After satisfactory induction of general LMA anesthesia, the patient was prepped and draped in sterile fashion. The previous incision was opened and seroma cavity was drained. The reconstruction with lumpectomy was taken down carefully. The lateral margin was easily identified. It was widely excised. It was oriented and sent to Pathology. All dissection planes were hemostatic. The wound was anesthetized with 0.5% Marcaine. The deep parenchymal flaps were then reapproximated with interrupted 3-0 Vicryl suture. The mid flaps were reapproximated with interrupted 3-0 Vicryl suture. Subcutaneous tissue was reapproximated with interrupted 3-0 Vicryl and the skin was closed with running subcuticular 4-0 Monocryl. The patient tolerated the procedure well. No complications. She was taken to the recovery room in stable condition.       Percy Fritz MD JP/SHERLY_JOSE CARLOS_I/  D:  2022 15:20  T:  04/15/2022 1:29  JOB #:  [id number]  CC:  MD Macario Green MD

## 2022-04-15 NOTE — PROGRESS NOTES
The patient called POD #1 re excision LEFT breast with concerns of redness on the LEFT breast. The incision is dry and intact. No complaints of severe pain, itch or fever. Dr. Darryl Marrero notified and he feels this should resolve over time. She is instructed to call us Monday if the symptoms persists or get worse. The patient was appreciative.

## 2022-04-18 DIAGNOSIS — E11.9 DIABETES MELLITUS TYPE 2, NONINSULIN DEPENDENT (HCC): ICD-10-CM

## 2022-04-18 RX ORDER — EMPAGLIFLOZIN, METFORMIN HYDROCHLORIDE 10; 1000 MG/1; MG/1
TABLET, EXTENDED RELEASE ORAL
Qty: 90 EACH | Refills: 1 | Status: SHIPPED | OUTPATIENT
Start: 2022-04-18 | End: 2022-05-30 | Stop reason: SINTOL

## 2022-04-19 ENCOUNTER — TELEPHONE (OUTPATIENT)
Dept: SURGERY | Age: 62
End: 2022-04-19

## 2022-04-22 DIAGNOSIS — E78.2 MIXED HYPERLIPIDEMIA: ICD-10-CM

## 2022-04-23 RX ORDER — ROSUVASTATIN CALCIUM 10 MG/1
TABLET, COATED ORAL
Qty: 90 TABLET | Refills: 1 | Status: SHIPPED | OUTPATIENT
Start: 2022-04-23 | End: 2022-09-26

## 2022-04-27 ENCOUNTER — OFFICE VISIT (OUTPATIENT)
Dept: SURGERY | Age: 62
End: 2022-04-27
Payer: COMMERCIAL

## 2022-04-27 VITALS
HEIGHT: 67 IN | BODY MASS INDEX: 30.92 KG/M2 | SYSTOLIC BLOOD PRESSURE: 134 MMHG | HEART RATE: 80 BPM | DIASTOLIC BLOOD PRESSURE: 54 MMHG | WEIGHT: 197 LBS

## 2022-04-27 DIAGNOSIS — C50.912 INFILTRATING DUCTAL CARCINOMA OF LEFT BREAST (HCC): ICD-10-CM

## 2022-04-27 PROCEDURE — 99024 POSTOP FOLLOW-UP VISIT: CPT | Performed by: SURGERY

## 2022-04-27 NOTE — PROGRESS NOTES
HISTORY OF PRESENT ILLNESS  Katarina Price is a 59 y.o. female. HPI  Established patient presents for post-op f/u s/p re-excision of lateral margin of LEFT breast lumpectomy on 04/14/22, and is doing well overall. 02/02/22: LEFT breast bx. PATH: IDC, largest focus measures 10mm, low grade, ER+(99%)/AZ+(45%)/HER2-, Ki-67 15%. Clinical stage 1.  - MammaPrint: Low-risk, luminal A-type, +0.208.    03/31/22: LEFT breast lumpectomy and SLNBx. PATH: IDC with lobular features, 17mm, grade 1, ER+(99%)/AZ+(45%)/HER2-, Ki-67 15%, positive margins, 4 benign LN (0/4). LCIS present. Pathologic stage: pT1c, pN0.     04/14/22: LEFT breast lumpectomy re-excision of lateral margin. PATH: Benign breast tissue with extensive fat necrosis and focal usual duct hyperplasia. No evidence of carcinoma. Past Medical History:   Diagnosis Date    Bone spur of ankle 2018    Bone spurs of feet  2/24/2017    Podiatry Dr Lionel [facility])     Squamous cell skin; breast (2022)    Diabetes mellitus type 2, noninsulin dependent ([facility] [address] 12/10/2015    3/2001    Diverticulosis age 26    FH: melanoma     [facility]: [facility]    Gestational diabetes 1998    Heel spur, left 9/19/2018 2/2018, Dr Rayetta Paget Hemorrhoids     Hiatal hernia with GERD and esophagitis 3/26/2020    EGD 2016    Hiatal Hernia, Gastritis, mild; BX neg for H. Pylori 4/2010 4/27/2010    History of abnormal Pap smear, s/p Cryotherapy in her 30's 12/5/2012    History of SCC (squamous cell carcinoma) of skin 1/9/2019    Skin checks and cancer screenings per [facility].  Dr. Wendy Avelar Hx of colonoscopy 7/11/2016    [facility] Dr. Christiano Vasquez Left foot pain 2009; s/p CSI 4/27/2010    Left sided sciatica 12/5/2012    Meniere's disease     Mixed hyperlipidemia 3/26/2020    Multinodular goiter 2/2/2022 2021 Endo Dr. Amna Mcfarland    Nausea & vomiting    Pretty Ramirez, LMP age ~ 45 yo, No HRT 12/5/2012    Pseudocholinesterase deficiency 1986 Prolonged period of recovery    Right knee pain; damage at medial meniscus; 2010 4/27/2010    S/p colonoscopy with polypectomy 4/2010 4/27/2010    Stress fracture foot     left    Stress incontinence 12/5/2012    Tendonitis, Achilles, left 1/9/2019    CSI Dr Lacey Whitten     Tendonitis, Achilles, right 4/19/2018 2-2018: Dr Lacey Whitten, bone spur chipped    Uterine fibroid     Vitamin D deficiency 12/11/2013 12/2013        Past Surgical History:   Procedure Laterality Date    HX BREAST BIOPSY Right 06/2014    Benign, [place]    HX BREAST LUMPECTOMY   1986    benign fibroadenoma right breast    HX BREAST LUMPECTOMY Left 3/31/2022    LEFT BREAST LUMPECTOMY WITH MAGSEED, LEFT BREAST SENTINEL NODE BIOPSY (PSEUDOCHOLINESTERASE DEFICIENCY) performed by Mariza Escamilla MD at [address] [place] [address] OR    HX BREAST LUMPECTOMY Left 4/14/2022    RE EXCISION LATERAL MARGIN LEFT BREAST LUMPECTOMY performed by Mariza Escamilla MD at [place] [address] [address]    Emergency for fetal distress    HX COLONOSCOPY  ~2010    Dr Lewis Norton; Normal except Diverticulosis, but f/u 5 yrs d/t Fhx    HX COLONOSCOPY  07/11/2016    Polyps, Multiple Diverticulae, Internal Hemorrhoids; f/u 5 yrs, Dr Salma Christianson HX COLONOSCOPY  11/30/2021    Diverticulosis, incomplete study due to poor prep, repeat 1 yr    HX DILATION AND CURETTAGE  10/8/14    AUB & Uterine Polyp (HYSTEROSCOPY)    HX ENDOSCOPY  07/11/2016    EGD: Hiatal Hernia, Grade 1 Esophagitis w/ reflux, Gastritis,  Dr Salma Christianson HX ENDOSCOPY  11/30/2021    HX HEENT  1984; Dr. Benedict Fleischer    Tonsillectomy; anesthesia reaction    HX LAP CHOLECYSTECTOMY  1998    gallstones    HX SKIN BIOPSY  ~2016    SCC excised from chest wall, Dr. Margie Guerra HYSTEROSCOPY,W/ENDO BX  5/2014    AUB;  Negative       Social History     Socioeconomic History    Marital status:      Spouse name: Not on file    Number of children: 3    Years of education: Not on file    Highest education level: Not on file   Occupational History    Occupation: Former Administrative work at [employer] Occupation: Works for the [employer] in [employer]    Occupation: Plans to possibly retire 2022   Tobacco Use    Smoking status: Former Smoker     Packs/day: 0.00     Years: 10.00     Pack years: 0.00     Types: Cigarettes     Quit date: 1991     Years since quittin.0    Smokeless tobacco: Never Used   Vaping Use    Vaping Use: Never used   Substance and Sexual Activity    Alcohol use:  Yes     Alcohol/week: 14.0 standard drinks     Types: 4 Standard drinks or equivalent, 10 Glasses of wine per week     Comment: 2-3 glasses wine 5 days a week    Drug use: No    Sexual activity: Yes     Partners: Male     Birth control/protection: None   Other Topics Concern     Service Not Asked    Blood Transfusions Not Asked    Caffeine Concern No     Comment: occ coffee, mostly just on weekends    Occupational Exposure Not Asked    Hobby Hazards Not Asked    Sleep Concern Not Asked    Stress Concern Not Asked    Weight Concern Yes     Comment: happy her wt is coming down w/ eating healthier and being more active    Special Diet Yes     Comment: cut back on sugars and making healthier food choices    Back Care Not Asked    Exercise No     Comment: nothing regular right now    Bike Helmet Not Asked    Seat Belt Not Asked    Self-Exams Not Asked   Social History Narrative    Initial PPV23 - age 59; will need PCV 13 at age 71 yo and PPSV 21 again at 76 yo        Diet: Noom Diet since     Exercise: since  walks 3-5 x a week x 20 minutes; has increased this to daily x 20-30 minutes    Caffeine: 2-3 cups of coffee a day    Weight: happy her wt has been gradually coming down w/ eating healthier over the years and increased exercise the past few months; down from the 240 lb range in 2019 to 198 now        [place] [address] [place] [address] with . Has 2 sons and 1 daughter. Works for the [employer] in [facility]. Likes to garden and music, theater and reading. Social Determinants of Health     Financial Resource Strain:     Difficulty of Paying Living Expenses: Not on file   Food Insecurity:     Worried About Running Out of Food in the Last Year: Not on file    Tiarra of Food in the Last Year: Not on file   Transportation Needs:     Lack of Transportation (Medical): Not on file    Lack of Transportation (Non-Medical): Not on file   Physical Activity:     Days of Exercise per Week: Not on file    Minutes of Exercise per Session: Not on file   Stress:     Feeling of Stress : Not on file   Social Connections:     Frequency of Communication with Friends and Family: Not on file    Frequency of Social Gatherings with Friends and Family: Not on file    Attends Confucianist Services: Not on file    Active Member of [address] [place] [address] or Organizations: Not on file    Attends Club or Organization Meetings: Not on file    Marital Status: Not on file   Intimate Partner Violence:     Fear of Current or Ex-Partner: Not on file    Emotionally Abused: Not on file    Physically Abused: Not on file    Sexually Abused: Not on file   Housing Stability:     Unable to Pay for Housing in the Last Year: Not on file    Number of [place] in the Last Year: Not on file    Unstable Housing in the Last Year: Not on file       Current Outpatient Medications on File Prior to Visit   Medication Sig Dispense Refill    rosuvastatin (CRESTOR) 10 mg tablet TAKE 1 TABLET BY MOUTH EVERY DAY AT NIGHT 90 Tablet 1    Synjardy XR 10-1,000 mg TBph TAKE 1 TABLET BY MOUTH EVERY DAY IN THE MORNING WITH BREAKFAST 90 Each 1    TURMERIC PO Take  by mouth.  omega-3/dha/epa/fish/turmeric (OMEGA MONOPURE CURCUMIN EC PO) Take  by mouth daily.       MAGNESIUM GLYCINATE PO Take 250 mg by mouth daily.  acetaminophen/diphenhydramine (TYLENOL PM PO) Take  by mouth nightly as needed.  meclizine (ANTIVERT) 25 mg tablet Take  by mouth three (3) times daily as needed for Dizziness.  diclofenac (VOLTAREN) 1 % gel Apply  to affected area four (4) times daily as needed for Pain. 2% (Patient not taking: Reported on 3/31/2022)      Lactobacillus acidophilus (PROBIOTIC ACIDOPHILUS PO) Take 1 Capsule by mouth daily. (Patient not taking: Reported on 3/31/2022)      Victoza 3-Manolo 0.6 mg/0.1 mL (18 mg/3 mL) pnij INJECT 1.8 MG (0.3 ML) SUBCUTANEOUSLY DAILY (Patient taking differently: 0.6 mg. INJECT 0.6 MG (0.3 ML) SUBCUTANEOUSLY DAILY) 3 mL 3    fluticasone propionate (FLONASE NA) by Nasal route.  pen needle, diabetic (NovoTwist) 32 gauge x 1/5\" ndle USE ONCE DAILY 100 Pen Needle 3    glucose blood VI test strips (Accu-Chek Nayeli Plus test strp) strip PER INSURANCE PREFERENCE, TEST 1X/D DX DIABETES E11.9 100 Strip 3    cholecalciferol (VITAMIN D3) (2,000 UNITS /50 MCG) cap capsule Take 2,000 Units by mouth daily.  melatonin 3 mg tablet Take 3 mg by mouth nightly.  krill-om-3-dha-epa-phospho-ast (MEGARED OMEGA-3 KRILL OIL) 1,000-230-60 mg cap Take 1 Cap by mouth daily. No current facility-administered medications on file prior to visit.        Allergies   Allergen Reactions    Succinylcholine Other (comments)     Prolonged period of recovery following anesthesia    Glipizide Other (comments)     Frequent hypoglycemia    Other Medication Diarrhea     Intolerant of >1000 mg of Metformin    Phentermine Other (comments)     Dizziness and \"crashes\"       OB History        3    Para   3    Term                AB        Living           SAB        IAB        Ectopic        Molar        Multiple        Live Births              Obstetric Comments   Menarche 6, LMP , # of children 1, age of 2st delivery 27, Hysterectomy/oophorectomy No/No, Breast bx Yes, history of breast feeding No, BCP Yes, Hormone therapy No    Emergency  x 1; vaginal deliveries x 2; Previous Gyn: Randy Lopez at a  [employer]; new Gyn Dr Rema Parrish (retired), changing to new Gyn at [facility] 2019; Dr. Erik GAO      Physical Exam  Exam conducted with a chaperone present. Cardiovascular:      Rate and Rhythm: Normal rate and regular rhythm. Heart sounds: Normal heart sounds. Pulmonary:      Breath sounds: Normal breath sounds. Chest:   Breasts: Breasts are symmetrical.      Right: Normal. No swelling, bleeding, inverted nipple, mass, nipple discharge, skin change, tenderness, axillary adenopathy or supraclavicular adenopathy. Left: Normal. No swelling, bleeding, inverted nipple, mass, nipple discharge, skin change, tenderness, axillary adenopathy or supraclavicular adenopathy. Lymphadenopathy:      Cervical:      Right cervical: No superficial, deep or posterior cervical adenopathy. Left cervical: No superficial, deep or posterior cervical adenopathy. Upper Body:      Right upper body: No supraclavicular or axillary adenopathy. Left upper body: No supraclavicular or axillary adenopathy. ASSESSMENT and PLAN    ICD-10-CM ICD-9-CM    1. Infiltrating ductal carcinoma of left breast [facility])  C50.912 174.9      Established patient presents for f/u s/p re-excision of lateral margin of LEFT breast lumpectomy on 22, and is doing well overall. Upon physical exam, noted small seroma in LEFT axilla. Well healed incision s/p lumpectomy, no evidence of local recurrence. Advised pt that neuropraxia will improve over the next few months and that she can use vitamin E oil for treatment of her incision scar. Pt had questions about radiation oncology appointment and scheduling xrt therapy and vacation plans in . Pt to recive LEFT breast mammogram in 6 months after xrt, and BL mammograms 6 months after then.  F/U in 6 months with Richard العلي NP. This plan was reviewed with the patient and patient agrees. All questions were answered.     Written by Candace Persaud, as dictated by Dr. Jace Long MD.

## 2022-04-27 NOTE — PATIENT INSTRUCTIONS

## 2022-04-27 NOTE — PROGRESS NOTES
HISTORY OF PRESENT ILLNESS  Timbo Marquez is a 59 y.o. female. HPI ESTABLISHED patient here for post op visit 4/14/22 LEFT breast re- excisional lumpectom    02/02/22: LEFT breast bx. PATH: IDC, largest focus measures 10mm, low grade, ER+(99%)/AZ+(45%)/HER2-, Ki-67 15%. Clinical stage 1.  · MammaPrint: Low-risk, luminal A-type, +0.208.     03/31/22: LEFT breast lumpectomy and SLNBx. PATH: IDC with lobular features, 17mm, grade 1, ER+(99%)/AZ+(45%)/HER2-, Ki-67 15%, positive margins, 4 benign LN (0/4). LCIS present. Pathologic stage: pT1c, pN0.      04/14/22: LEFT breast lumpectomy re-excision of lateral margin. PATH: Benign breast tissue with extensive fat necrosis and focal usual duct hyperplasia.  No evidence of carcinoma.        ROS    Physical Exam    ASSESSMENT and PLAN  {ASSESSMENT/PLAN:19809}

## 2022-05-03 ENCOUNTER — OFFICE VISIT (OUTPATIENT)
Dept: ONCOLOGY | Age: 62
End: 2022-05-03
Payer: COMMERCIAL

## 2022-05-03 VITALS
DIASTOLIC BLOOD PRESSURE: 64 MMHG | BODY MASS INDEX: 31.23 KG/M2 | SYSTOLIC BLOOD PRESSURE: 104 MMHG | TEMPERATURE: 97.2 F | WEIGHT: 199 LBS | HEART RATE: 87 BPM | OXYGEN SATURATION: 96 % | HEIGHT: 67 IN

## 2022-05-03 DIAGNOSIS — C50.912 INFILTRATING DUCTAL CARCINOMA OF LEFT BREAST (HCC): Primary | ICD-10-CM

## 2022-05-03 PROCEDURE — 99205 OFFICE O/P NEW HI 60 MIN: CPT | Performed by: INTERNAL MEDICINE

## 2022-05-03 RX ORDER — ANASTROZOLE 1 MG/1
1 TABLET ORAL DAILY
Qty: 90 TABLET | Refills: 3 | Status: SHIPPED | OUTPATIENT
Start: 2022-05-03

## 2022-05-03 NOTE — PROGRESS NOTES
[facility] [place] at [address], [facility]: [phone]  F: [phone]    Reason for Visit:   Danny Lozano is a 59 y.o. female who is seen in consultation at the request of Dr. Carlo Marshall for evaluation of breast cancer. Treatment History:   Breast biopsy 2/22  Lumpectomy 4/22      STAGE: T1cN0 ER+ [id number]    History of Present Illness:     Pt seen today for office consult for new breast cancer ER+ Her2 negative post lumpectomy 4/22. Initially pt had abnormal mammo 12/21 with [facility]. LEFT breast abnormality. 02/02/22: LEFT breast bx. PATH: IDC, largest focus measures 10mm, low grade, ER+(99%)/OK+(45%)/HER2-, Ki-67 15%. Clinical stage 1.    · MammaPrint: Low-risk, luminal A-type, +0.208. Had breast MRI with solitary small mass.     03/31/22: LEFT breast lumpectomy and SLNBx. PATH: IDC with lobular features, 17mm, grade 1, ER+(99%)/OK+(45%)/HER2-, Ki-67 15%, positive margins, 4 benign LN (0/4). LCIS present. Pathologic stage: pT1c, pN0.      04/14/22: LEFT breast lumpectomy re-excision of lateral margin. PATH: Benign breast tissue with extensive fat necrosis and focal usual duct hyperplasia. No evidence of carcinoma. Pt did well with surgery. Has DM/ thyroid disease/ high cholesterol. Feels well today. Going on vacation 6/24/22 to [place].    No fevers/ chills/ chest pain/ SOB/ nausea/ vomiting/diarrhea/ pain/fatigue    FamHx no breast cancer      Past Medical History:   Diagnosis Date    Bone spur of ankle 2018    Bone spurs of feet  2/24/2017    Podiatry Dr [facility])     Squamous cell skin; breast (2022)    Diabetes mellitus type 2, noninsulin dependent ([facility] [address] 12/10/2015    3/2001    Diverticulosis age 26    FH: melanoma     also FH: [facility]    Gestational diabetes 1998    Heel spur, left 9/19/2018 2/2018, Dr Mirian Peterson Hemorrhoids     Hiatal hernia with GERD and esophagitis 3/26/2020    EGD 2016  Hiatal Hernia, Gastritis, mild; BX neg for H. Pylori 4/2010 4/27/2010    History of abnormal Pap smear, s/p Cryotherapy in her 30's 12/5/2012    History of SCC (squamous cell carcinoma) of skin 1/9/2019    Skin checks and cancer screenings per [place].  Dr. Leobardo Brownlee Hx of colonoscopy 7/11/2016    [facility] Dr. Ray Ma Left foot pain 2009; s/p CSI 4/27/2010    Left sided sciatica 12/5/2012    Meniere's disease     Mixed hyperlipidemia 3/26/2020    Multinodular goiter 2/2/2022 2021 Endo Dr. Tarik Rizzo    Nausea & vomiting    Garlin Senate, LMP age ~ 45 yo, No HRT 12/5/2012    Pseudocholinesterase deficiency 1986    Prolonged period of recovery    Right knee pain; damage at medial meniscus; 2010 4/27/2010    S/p colonoscopy with polypectomy 4/2010 4/27/2010    Stress fracture foot     left    Stress incontinence 12/5/2012    Tendonitis, Achilles, left 1/9/2019    [facility] Dr Brandt Necessary     Tendonitis, Achilles, right 4/19/2018 2-2018: Dr Brandt Necessary, bone spur chipped    Uterine fibroid     Vitamin D deficiency 12/11/2013 12/2013       Past Surgical History:   Procedure Laterality Date    HX BREAST BIOPSY Right 06/2014    Benign, Lilyan Fine    HX BREAST LUMPECTOMY   1986    benign fibroadenoma right breast    HX BREAST LUMPECTOMY Left 3/31/2022    LEFT BREAST LUMPECTOMY WITH MAGSEED, LEFT BREAST SENTINEL NODE BIOPSY (PSEUDOCHOLINESTERASE DEFICIENCY) performed by Gutierrez Valle MD at [address] HX BREAST LUMPECTOMY Left 4/14/2022    RE EXCISION LATERAL MARGIN LEFT BREAST LUMPECTOMY performed by Gutierrez Valle MD at [facility]    Emergency for fetal distress    HX COLONOSCOPY  ~2010    Dr Maia Baker; Normal except Diverticulosis, but f/u 5 yrs d/t Fhx    HX COLONOSCOPY  07/11/2016    Polyps, Multiple Diverticulae, Internal Hemorrhoids; f/u 5 yrs, Dr Ray Ma HX COLONOSCOPY  11/30/2021    Diverticulosis, incomplete study due to poor prep, repeat 1 yr    HX DILATION AND CURETTAGE  10/8/14    AUB & Uterine Polyp (HYSTEROSCOPY)    HX ENDOSCOPY  2016    EGD: Hiatal Hernia, Grade 1 Esophagitis w/ reflux, Gastritis,  Dr Justa Rdz HX ENDOSCOPY  2021    HX HEENT  ; Dr. Lauren De Los Santos    Tonsillectomy; anesthesia reaction    HX LAP CHOLECYSTECTOMY      gallstones    HX SKIN BIOPSY  ~    SCC excised from chest wall, Dr. Harjit Rubalcava HYSTEROSCOPY,W/ENDO BX  2014    AUB; Negative      Social History     Tobacco Use    Smoking status: Former Smoker     Packs/day: 0.00     Years: 10.00     Pack years: 0.00     Types: Cigarettes     Quit date: 1991     Years since quittin.0    Smokeless tobacco: Never Used   Substance Use Topics    Alcohol use: Yes     Alcohol/week: 14.0 standard drinks     Types: 4 Standard drinks or equivalent, 10 Glasses of wine per week     Comment: 2-3 glasses wine 5 days a week      Family History   Problem Relation Age of Onset    COPD Mother         smoker    Colon Polyps Mother 72    Osteoporosis Mother     Lung Disease Mother         COPD    Delayed Awakening Mother         No dx; possible    Anesth Problems Mother         DELAYED AWAKENING    Stroke Father         Mini strokes    Dementia Father         Alzheimer's    Heart Disease Father         PVD    Alcohol abuse Father     Thyroid Disease Sister         hypothyroidism    SKIN CANCER Sister         melanoma    Diabetes Maternal Grandmother     Lung Cancer Maternal Grandfather     Cancer Maternal Grandfather         Lung    Hypertension Brother         living in his 66's    Cancer Sister         Skin -melanoma, sq, bas.  Cancer Sister         stage 0 melanoma    Diabetes Maternal Uncle      Current Outpatient Medications   Medication Sig    anastrozole (ARIMIDEX) 1 mg tablet Take 1 mg by mouth daily.  Indications: hormone receptor positive breast cancer    rosuvastatin (CRESTOR) 10 mg tablet TAKE 1 TABLET BY MOUTH EVERY DAY AT NIGHT    Synjardy XR 10-1,000 mg TBph TAKE 1 TABLET BY MOUTH EVERY DAY IN THE MORNING WITH BREAKFAST    TURMERIC PO Take  by mouth.  omega-3/dha/epa/fish/turmeric (OMEGA MONOPURE CURCUMIN EC PO) Take  by mouth daily.  MAGNESIUM GLYCINATE PO Take 250 mg by mouth daily.  acetaminophen/diphenhydramine (TYLENOL PM PO) Take  by mouth nightly as needed.  meclizine (ANTIVERT) 25 mg tablet Take  by mouth three (3) times daily as needed for Dizziness.  Lactobacillus acidophilus (PROBIOTIC ACIDOPHILUS PO) Take 1 Capsule by mouth daily.  Victoza 3-Manolo 0.6 mg/0.1 mL (18 mg/3 mL) pnij INJECT 1.8 MG (0.3 ML) SUBCUTANEOUSLY DAILY (Patient taking differently: 0.6 mg. INJECT 0.6 MG (0.3 ML) SUBCUTANEOUSLY DAILY)    fluticasone propionate (FLONASE NA) by Nasal route.  pen needle, diabetic (NovoTwist) 32 gauge x 1/5\" ndle USE ONCE DAILY    glucose blood VI test strips (Accu-Chek Nayeli Plus test strp) strip PER INSURANCE PREFERENCE, TEST 1X/D DX DIABETES E11.9    cholecalciferol (VITAMIN D3) (2,000 UNITS /50 MCG) cap capsule Take 2,000 Units by mouth daily.  krill-om-3-dha-epa-phospho-ast (MEGARED OMEGA-3 KRILL OIL) 1,000-230-60 mg cap Take 1 Cap by mouth daily.  diclofenac (VOLTAREN) 1 % gel Apply  to affected area four (4) times daily as needed for Pain. 2% (Patient not taking: Reported on 3/31/2022)    melatonin 3 mg tablet Take 3 mg by mouth nightly. No current facility-administered medications for this visit.       Allergies   Allergen Reactions    Succinylcholine Other (comments)     Prolonged period of recovery following anesthesia    Glipizide Other (comments)     Frequent hypoglycemia    Other Medication Diarrhea     Intolerant of >1000 mg of Metformin    Phentermine Other (comments)     Dizziness and \"crashes\"        A complete review of systems was obtained, negative except as described above and as reported on ROS sheet scanned into system. Physical Exam:     Visit Vitals  /64 (BP 1 Location: Right arm, BP Patient Position: Sitting)   Pulse 87   Temp 97.2 °F (36.2 °C) (Temporal)   Ht 5' 7\" (1.702 m)   Wt 199 lb (90.3 kg)   SpO2 96%   BMI 31.17 kg/m²     ECOG PS: 0  General: No distress  Eyes: PERRLA, anicteric sclerae  HENT: Atraumatic, wearing mask  Neck: Supple  Respiratory: CTAB, normal respiratory effort  CV: Normal rate, regular rhythm, no murmurs, no peripheral edema  GI: Soft, nontender, nondistended  MS: Normal gait and station. Digits without clubbing or cyanosis. Skin: No rashes, ecchymoses, or petechiae. Normal temperature, turgor, and texture. Psych: Alert, oriented, appropriate affect, normal judgment/insight  Neuro non focal  Breast no masses palpable b/l     Results:     Lab Results   Component Value Date/Time    WBC 7.9 03/24/2022 11:23 AM    HGB 14.4 03/24/2022 11:23 AM    HCT 44.2 03/24/2022 11:23 AM    PLATELET 097 59/77/4232 11:23 AM    MCV 92.5 03/24/2022 11:23 AM    ABS. NEUTROPHILS 4.3 03/24/2022 11:23 AM     Lab Results   Component Value Date/Time    Sodium 139 03/24/2022 11:23 AM    Potassium 4.1 03/24/2022 11:23 AM    Chloride 109 (H) 03/24/2022 11:23 AM    CO2 24 03/24/2022 11:23 AM    Glucose 124 (H) 03/24/2022 11:23 AM    BUN 19 03/24/2022 11:23 AM    Creatinine 0.75 03/24/2022 11:23 AM    GFR est AA >60 03/24/2022 11:23 AM    GFR est non-AA >60 03/24/2022 11:23 AM    Calcium 9.1 03/24/2022 11:23 AM    Glucose (POC) 106 04/14/2022 03:21 PM     Lab Results   Component Value Date/Time    Bilirubin, total 0.5 12/22/2020 10:04 AM    ALT (SGPT) 30 12/06/2021 08:50 AM    Alk.  phosphatase 73 12/22/2020 10:04 AM    Protein, total 7.1 12/22/2020 10:04 AM    Albumin 4.1 12/22/2020 10:04 AM    Globulin 3.0 12/22/2020 10:04 AM     MRI Results (most recent):  Results from [place] encounter on 02/22/22    MRI BREAST BI W WO CONT    Narrative  HISTORY: 57-year-old female with newly diagnosed left breast cancer, presenting  for preoperative breast MRI. COMPARISON: Mammography from February and January 2022, as well as December 2021. No prior MRI. TECHNIQUE:  Bilateral breast MRI was performed using a dedicated breast coil without  compression with the patient in the prone position. Precontrast T1-weighted  images with fat suppression were obtained followed by bolus injection of 9 mL  Gadavist. Postcontrast dynamic and high-resolution images were acquired. T2-weighted axial imaging with fat suppression was also performed. The images  were analyzed using CAD analysis, enhancement curves, digital subtraction, and 2  and 3 dimensional reconstructions. FINDINGS:    Background parenchymal enhancement: Mild    Mammographic breast density: Scattered fibroglandular breast tissue    Right breast:  There is no suspicious mass or mass enhancement within the right breast. There  is no skin thickening or nipple retraction. There is no suspicious axillary or internal mammary chain lymphadenopathy. Left breast:  Within the middle third of the left breast upper inner quadrant, there is  evidence of a biopsy clip, and associated biopsy tract, and ill-defined  malignant enhancement measuring up to 1 x 1.2 x 1.2 cm. There is mixed internal  kinetics. This is consistent with newly diagnosed breast carcinoma. There is no  other suspicious enhancement within the left breast. There is no evidence of  multifocal or multicentric disease. There is no skin thickening or nipple  retraction. There is no suspicious axillary or internal mammary chain lymphadenopathy. Impression  Right Breast:  1. BI-RADS Assessment Category 1: Negative. No evidence of breast carcinoma  within the right breast.    Left Breast:  1. BI-RADS Assessment Category 6: Known biopsy proven malignancy- Appropriate  action should be taken.  1.2 cm of malignant masslike enhancement and associated  biopsy clip, located in the middle third of the left breast upper inner  quadrant, consistent with newly diagnosed breast carcinoma. 2. No evidence of multicentric or multifocal disease. 3. No suspicious lymphadenopathy. RECOMMENDATIONS:  The newly diagnosed left breast malignancy appears amenable to breast  conservation therapy. There is no evidence of multifocal, multicentric, or  contralateral disease. A summary portfolio has been created in PACS. Records reviewed and summarized above. Pathology report(s) reviewed above. Radiology report(s) reviewed above. Assessment:/PLAN     1)  Stage 1 T1cN0 LEFT breast cancer ER+ Her2 negative mammaprint low risk post lumpectomy/ re excision 4/22. Records and history reviewed with pt today. Reviewed path and data specifically with pt. Discussed dx, stage and treatment of breast cancer. Reviewed mammaprint low risk. Discussed adjuvant chemo and hormonal therapy today. No chemo needed  To see radiation. Discussed adjuvant hormonal therapy with AI. The risks and benefits of aromatase inhibitors (anastrozole, letrozole, and exemestane) were discussed in detail and the patient was informed of the following: Risks include the development of painful muscles and joints (arthralgia/myalgia) and bone loss. Muscle and joint pain can be severe but rarely result in any tissue damage; symptoms usually resolve in several weeks when the medication is stopped. Bone loss is common and a bone density test is recommended as a baseline and then yearly to every several years depending on initial results. The risk of fractures is increased by a few percent in patients taking these drugs, but careful monitoring of bone density and using bone protecting agents when indicated can minimize these risks. Unlike tamoxifen there is no increased risk of blood clots or endometrial cancer.  AIs can cause or worsen vaginal dryness but women using these drugs should not use vaginal estrogen preparations for these symptoms. AIs can also cause or increase hot flashes. Any other symptoms should be reported. Pt will start AI after radiation. Pt is healthy overall. 2) DM/ thyroid/ high cholesterol/ inner ear disorder. Per PCP. 3) Psychosocial. Mood good. Coping well. Works as  for the grafter. Walking daily. Does Noom. Fu here in 3 months  Call if questions    I appreciate the opportunity to participate in Ms. Mitali Ortega's care.     Signed By: Savi Dunbar,

## 2022-05-03 NOTE — PROGRESS NOTES
Ziggy Shirley is a 59 y.o. female  Chief Complaint   Patient presents with    New Patient    Breast Cancer     1. Have you been to the ER, urgent care clinic since your last visit? Hospitalized since your last visit? No.    2. Have you seen or consulted any other health care providers outside of the [address] [place] [address] since your last visit? Include any pap smears or colon screening.  No.

## 2022-05-03 NOTE — PATIENT INSTRUCTIONS
Anastrozole (Arimidex®)   This information is about a hormonal therapy used to treat breast cancer called anastrozole, which is also called Arimidex®. Throughout this page we refer to it by its more commonly used name, Arimidex. This information should ideally be read with our general information about breast cancer or secondary breast cancer. Arimidex   Arimidex is a type of hormonal therapy used to treat breast cancer in women who have been through the menopause (change of life). You will see your doctor regularly while you have this treatment so they can monitor its effects. Hormonal therapies interfere with the production or action of particular hormones in the body. Hormones are substances produced naturally in the body. They act as chemical messengers and help control the activity of cells and organs. Aromatase inhibitors   Many breast cancers rely on the hormone oestrogen to grow. These cancers are known as hormone-sensitive breast cancers. In women who have had their menopause, the main source of oestrogen is through the conversion of androgens (sex hormones produced by the adrenal glands) into oestrogens. This is carried out by an enzyme called aromatase. The conversion process is known as aromatisation, and it happens mainly in the fatty tissues of the body. Arimidex is a drug that blocks the process of aromatisation, and so reduces the amount of oestrogen in the body. As less oestrogen reaches the cancer cells, they grow more slowly or stop growing altogether. Drugs that work in this way are known as aromatase inhibitors. Other aromatase inhibitors include letrozole (Femara®) and exemestane (Aromasin®). How Arimidex is taken   Arimidex is a tablet that is taken once a day. It should be swallowed whole with a glass of water, at about the same time each day. It doesn't matter whether this is in the morning or evening.    When it is given   Your doctor will take into account a number of different factors when planning your treatment. Arimidex is used to treat post-menopausal women with hormone-sensitive breast cancer. Early breast cancer   Arimidex may be given to women with early breast cancer (cancer that has not spread) after they have had surgery to remove the cancer. Giving treatment after surgery to reduce the chance of the cancer coming back is known as adjuvant therapy. For some women, Arimidex may be more effective than tamoxifen, and it has different side effects. Studies show that switching to Arimidex after taking tamoxifen for 2-3 years may be better than five years of tamoxifen for some women. Advanced breast cancer   Arimidex can be used to treat women who have breast cancer that has spread to other parts of the body (advanced or secondary breast cancer). It can also be used to treat women whose breast cancer has come back after initial treatment. You might find our information about staging and grading of breast cancer useful. Length of treatment   Your doctors will discuss the length of treatment they feel is appropriate for you. Arimidex may be given over a number of years, or for as long as it is controlling your cancer, depending on your individual situation. Possible side effects   Each person's reaction to any medicine is different. Most people have very few side effects with Arimidex, while others may experience more. The side effects described here won't affect everyone and may be different if you are taking more than one drug. We have outlined the most common side effects, but haven't included those that are rare and therefore unlikely to affect you. If you notice any effects which aren't listed here, discuss them with your doctor or nurse. You may have some of the following side effects, to varying degrees:   Hot flushes and sweats   These are usually mild and may wear off after a period of time.  Sometimes people find it helps to cut down on tea, coffee, nicotine and alcohol. Research suggests that hormones called progestogens or some types of antidepressants may be helpful in controlling this side effect. Your doctor or nurse can discuss this with you. Some people find complementary therapies such as acupuncture helpful. Your GP may be able to give you details about having these on the NHS. If you find your own therapist, make sure that they are properly qualified and registered. You can read more about treatments for menopausal symptoms like hot flushes in our information about managing menopausal symptoms. Vaginal dryness   This may occur while using Arimidex. Gels that can help to overcome the dryness are available. You can buy these from a  or your doctor can prescribe them. Feeling sick (nausea) and being sick (vomiting)   These side effects are rare and usually mild. They can usually be effectively treated, so let your doctor know if you are affected. Feeling sick can often be relieved by taking your tablet with food or at night. Diarrhoea   If you have diarrhoea it's important to drink plenty of fluids. Hair thinning   Some women notice that their hair becomes thinner while taking Arimidex. This is usually mild and the hair grows back at the end of treatment. Headaches   Some people have headaches while taking Arimidex, but this is not common. It's important to drink plenty of fluids. Let your doctor know if you are getting headaches, as they can prescribe medication to help. Skin rashes   Rarely, Arimidex can cause skin rashes. Vaginal bleeding   Some women have some vaginal bleeding, usually in the first few weeks of treatment. This is rare and usually occurs after changing from other hormonal therapies to treatment with Arimidex. If the bleeding continues, tell your doctor or breast care nurse. Joint pains/muscular stiffness   Some women have pain and stiffness in their joints while taking Arimidex.  Let your doctor know if these effects are a problem. You may find it helpful to take mild painkillers. Tiredness (fatigue) and lethargy   Some people can have increased tiredness, especially at the start of treatment. It's important to get plenty of rest. If you are very sleepy you should not drive or operate machinery. Risk of osteoporosis   Women who have, or are at risk of, osteoporosis (weakened bones), should have their bone strength assessed before and during treatment with Arimidex. Some women may need to take bone-strengthening drugs to help prevent osteoporosis developing. Always let your doctor or nurse know about any side effects you have. There are usually ways in which they can be controlled or improved. Things to remember about Arimidex tablets    Arimidex may interact with other medicines. Tell your doctor about any medicines you are taking, including non-prescribed drugs such as complementary therapies, vitamins and herbal drugs.  Keep the tablets in a safe place, out of the reach of children.  Keep the tablets in the original packaging and store them at room temperature, away from heat and direct sunlight.  Don't worry if you forget to take your tablet. Do not take a double dose. The levels of the drug in your blood will not change very much, but try not to miss more than one or two tablets in a row.  If your doctor decides to stop the treatment, return any remaining tablets to the pharmacist. Don't flush them down the toilet or throw them away.  Remember to get a new prescription a few weeks before you run out of tablets, and make sure that you have plenty for holidays. References   This information is based on our Anastrozole (Arimidex®) fact sheet and has been compiled using information from a number of reliable sources, including:    compa Munoz. Lizz Erwin: The Complete Drug Reference. 36th edition. 2009. [employer]. [employer]. 59th edition. 2010.  [facility] [employer] and [address].  Early and locally advanced breast cancer: diagnosis and treatment. February 2009. National institute for Health and Clinical Excellence (NICE).  electronic Medicines Compendium [facility]). www.medicines. org.uk (accessed September 2010).

## 2022-05-05 ENCOUNTER — OFFICE VISIT (OUTPATIENT)
Dept: FAMILY MEDICINE CLINIC | Age: 62
End: 2022-05-05
Payer: COMMERCIAL

## 2022-05-05 VITALS
RESPIRATION RATE: 16 BRPM | OXYGEN SATURATION: 98 % | SYSTOLIC BLOOD PRESSURE: 124 MMHG | HEART RATE: 78 BPM | DIASTOLIC BLOOD PRESSURE: 68 MMHG | TEMPERATURE: 98.5 F | HEIGHT: 67 IN | WEIGHT: 202 LBS | BODY MASS INDEX: 31.71 KG/M2

## 2022-05-05 DIAGNOSIS — R30.0 DYSURIA: ICD-10-CM

## 2022-05-05 DIAGNOSIS — N39.0 ACUTE UTI: ICD-10-CM

## 2022-05-05 DIAGNOSIS — R39.15 URINARY URGENCY: Primary | ICD-10-CM

## 2022-05-05 DIAGNOSIS — B37.31 VAGINAL YEAST INFECTION: ICD-10-CM

## 2022-05-05 LAB
BILIRUB UR QL STRIP: NEGATIVE
GLUCOSE UR-MCNC: NORMAL MG/DL
KETONES P FAST UR STRIP-MCNC: NEGATIVE MG/DL
PH UR STRIP: 5.5 [PH] (ref 4.6–8)
PROT UR QL STRIP: NORMAL
SP GR UR STRIP: 1.02 (ref 1–1.03)
UA UROBILINOGEN AMB POC: NORMAL (ref 0.2–1)
URINALYSIS CLARITY POC: NORMAL
URINALYSIS COLOR POC: YELLOW
URINE BLOOD POC: NORMAL
URINE LEUKOCYTES POC: NORMAL
URINE NITRITES POC: POSITIVE

## 2022-05-05 PROCEDURE — 81003 URINALYSIS AUTO W/O SCOPE: CPT | Performed by: FAMILY MEDICINE

## 2022-05-05 PROCEDURE — 99213 OFFICE O/P EST LOW 20 MIN: CPT | Performed by: FAMILY MEDICINE

## 2022-05-05 RX ORDER — TERCONAZOLE 4 MG/G
1 CREAM VAGINAL
Qty: 45 G | Refills: 0 | Status: SHIPPED | OUTPATIENT
Start: 2022-05-05 | End: 2022-05-12

## 2022-05-05 RX ORDER — NITROFURANTOIN 25; 75 MG/1; MG/1
100 CAPSULE ORAL 2 TIMES DAILY
Qty: 10 CAPSULE | Refills: 0 | Status: SHIPPED | OUTPATIENT
Start: 2022-05-05 | End: 2022-05-10

## 2022-05-05 NOTE — PROGRESS NOTES
Chief Complaint   Patient presents with    Urgency     x 3 days    Urinary Pain    Yeast Infection       HISTORY OF PRESENT ILLNESS   HPI  Started 3 days ago w/ vaginal redness, irritation and burning. Started yesterday w/ urinary urgency, frequency, burning, painful urination, and bladder pressure. Today she noticed some white thick discharge. No odor. She has a h/o mild stress incontinence and that has not worsened. Denies abdominal pain, back pain, flank pain, n/v, fevers, chills, hematuria, nocturia. She used Vagisil Cream a few days ago. No other home remedies tried. Not prone toward recurrent UTI's, last one was several years ago. Denies past  hx, procedures or urology evaluation. Caffeine: 2-3 cups of half caff coffee a day, tea at lunch a few x a week, Diet Coke maybe once a month  Non-smoker  Etoh: 1-2 glasses of wine w/ dinner ~ 5 nights a week  OB History        3    Para   3    Term                AB        Living           SAB        IAB        Ectopic        Molar        Multiple        Live Births              Obstetric Comments   Menarche 6, LMP , # of children 1, age of 4st delivery 27, Hysterectomy/oophorectomy No/No, Breast bx Yes, history of breast feeding No, BCP Yes, Hormone therapy No    Emergency  x 1; vaginal deliveries x 2; Previous Gyn: Mckenna Peter at a  [employer]; new Gyn Dr France Jacobs (retired), changing to new Gyn at [email or web address] ([facility]) [facility] 2019; Dr. Varsha Armendariz of Systems   Constitutional: Negative. Negative for chills and fever. Gastrointestinal: Negative for abdominal pain, nausea and vomiting. Genitourinary: Positive for dysuria, frequency and urgency. Negative for flank pain and hematuria.            PROBLEM LIST/MEDICAL HISTORY     Problem List  Date Reviewed: 2022          Codes Class Noted    Multinodular goiter ICD-10-CM: E04.2  ICD-9-CM: 241.1  2022    Overview Signed 2022 9:12 AM by Nino Ha MD     [address] Isaiah Drew             Infiltrating ductal carcinoma of left breast [facility]) ICD-10-CM: C50.912  ICD-9-CM: 174.9  2/2/2022    Overview Addendum 4/27/2022  8:46 AM by Roxy Myers     02/02/22: LEFT breast bx. PATH: IDC, largest focus measures 10mm, low grade, ER+(99%)/NV+(45%)/HER2-, Ki-67 15%. Clinical stage 1.  - MammaPrint: Low-risk, luminal A-type, +0.208.    03/31/22: LEFT breast lumpectomy and SLNBx. PATH: IDC with lobular features, 17mm, grade 1, ER+(99%)/NV+(45%)/HER2-, Ki-67 15%, positive margins, 4 benign LN (0/4). LCIS present. Pathologic stage: pT1c, pN0.     04/14/22: LEFT breast lumpectomy re-excision of lateral margin. PATH: Benign breast tissue with extensive fat necrosis and focal usual duct hyperplasia. No evidence of carcinoma. -              Mixed hyperlipidemia ICD-10-CM: E78.2  ICD-9-CM: 272.2  3/26/2020        Hiatal hernia with GERD and esophagitis ICD-10-CM: K44.9, K21.00  ICD-9-CM: 553.3, 530.11  3/26/2020    Overview Signed 3/26/2020  8:48 AM by Nino Ha MD     EGD 2016             Tendonitis, Achilles, left ICD-10-CM: M76.62  ICD-9-CM: 726.71  1/9/2019    Overview Signed 1/9/2019  8:33 AM by Nino Ha MD     CSI Dr Carlos Vickers              History of SCC (squamous cell carcinoma) of skin ICD-10-CM: J39.997  ICD-9-CM: V10.83  1/9/2019    Overview Signed 1/9/2019  9:24 AM by Nino Ha MD     Skin checks and cancer screenings per Isaiah.  Dr. Romaine Mallory             Heel spur, left ICD-10-CM: M77.32  ICD-9-CM: 726.73  9/19/2018    Overview Signed 9/19/2018  8:49 AM by Nino Ha MD     2/2018, Dr Carlos Vickers             Tendonitis, Achilles, right ICD-10-CM: M76.61  ICD-9-CM: 726.71  4/19/2018    Overview Signed 4/19/2018  9:24 AM by Nino Ha MD     2-2018: Dr Carlos Vickers, bone spur chipped             Bone spurs of feet  ICD-10-CM: M77.50  ICD-9-CM: 726.91 2/24/2017    Overview Signed 2/24/2017 10:56 AM by Dee Martinez MD     Podiatry Dr Hoang Cueva             Diabetes mellitus type 2, noninsulin dependent ([facility] ICD-10-CM: E11.9  ICD-9-CM: 250.00  12/10/2015    Overview Addendum 12/10/2020 11:56 AM by Dee Martinez MD     3/2001, Eye doctor Dr. Mervat SANON             Vitamin D deficiency ICD-10-CM: E55.9  ICD-9-CM: 268.9  12/11/2013    Overview Signed 12/11/2013  1:27 PM by Dee Martinez MD     12/2013               Postmenopause, LMP age ~ 43 yo, No HRT ICD-10-CM: Z78.0  ICD-9-CM: V49.81  12/5/2012        History of abnormal Pap smear, s/p Cryotherapy in her 27's ICD-10-CM: Z87.898  ICD-9-CM: V13.29  12/5/2012        Stress incontinence ICD-10-CM: N39.3  ICD-9-CM: Dian Moll  12/5/2012    Overview Signed 12/5/2012  9:55 AM by Dee Martinez MD     Since child bearing years             Left sided sciatica ICD-10-CM: M54.32  ICD-9-CM: 724.3  12/5/2012    Overview Signed 12/5/2012 10:25 AM by Dee Martinez MD     12/2012             S/p colonoscopy with polypectomy and diverticulosis 4/2010 ICD-10-CM: Z98.890  ICD-9-CM: V45.89  4/27/2010    Overview Signed 4/27/2010  9:16 AM by MD Dr Irene Deras             Hiatal Hernia, Gastritis, mild; BX neg for H. Pylori 4/2010 ICD-10-CM: K29.70  ICD-9-CM: 535.50  4/27/2010    Overview Signed 4/27/2010  9:17 AM by MD Dr Irene Deras             Right knee pain; damage at medial meniscus; 2010 ICD-10-CM: M25.561  ICD-9-CM: 719.46  4/27/2010    Overview Signed 4/27/2010  9:19 AM by MD Dr Derian Deras; P.T.              Left foot pain 2009; s/p CSI ICD-10-CM: P05.616  ICD-9-CM: 729.5  4/27/2010    Overview Signed 4/27/2010  9:20 AM by MD Dr Hoang Deras             Uterine fibroid ICD-10-CM: D25.9  ICD-9-CM: 218.9  Unknown        Meniere's disease ICD-10-CM: H81.09  ICD-9-CM: 386.00 Unknown    Overview Signed 5/24/2016 12:30 PM by Nino Ha MD     ENT ~ 2003, Dr [address]             FH: melanoma ICD-10-CM: Z80.8  ICD-9-CM: V16.8  Unknown    Overview Signed 1/9/2019  9:25 AM by Nino Ha MD     Skin cancer screenings, Derm Dr Romaine Mallory             GERD (gastroesophageal reflux disease) ICD-10-CM: K21.9  ICD-9-CM: 530.81  Unknown                  PAST SURGICAL HISTORY     Past Surgical History:   Procedure Laterality Date    HX BREAST BIOPSY Right 06/2014    Benign, Temitope Riding    HX BREAST LUMPECTOMY   1986    benign fibroadenoma right breast    HX BREAST LUMPECTOMY Left 3/31/2022    LEFT BREAST LUMPECTOMY WITH MAGSEED, LEFT BREAST SENTINEL NODE BIOPSY (PSEUDOCHOLINESTERASE DEFICIENCY) performed by [facility] at [facility]    HX BREAST LUMPECTOMY Left 4/14/2022    RE EXCISION LATERAL MARGIN LEFT BREAST LUMPECTOMY performed by Wendy Vargas MD at [address]    Emergency for fetal distress    HX COLONOSCOPY  ~2010    Dr Zoran Cho; Normal except Diverticulosis, but f/u 5 yrs d/t Fhx    HX COLONOSCOPY  07/11/2016    Polyps, Multiple Diverticulae, Internal Hemorrhoids; f/u 5 yrs, Dr Eulalia Bentley HX COLONOSCOPY  11/30/2021    Diverticulosis, incomplete study due to poor prep, repeat 1 yr    HX DILATION AND CURETTAGE  10/8/14    AUB & Uterine Polyp (HYSTEROSCOPY)    HX ENDOSCOPY  07/11/2016    EGD: Hiatal Hernia, Grade 1 Esophagitis w/ reflux, Gastritis,  Dr Eulalia Bentley HX ENDOSCOPY  11/30/2021    HX HEENT  1984; Dr. Hawkins Mantle    Tonsillectomy; anesthesia reaction    HX LAP CHOLECYSTECTOMY  1998    gallstones    HX SKIN BIOPSY  ~2016    SCC excised from chest wall, Dr. Ramez Strange HYSTEROSCOPY,W/ENDO [place] 9  5/2014    AUB;  Negative         MEDICATIONS     Current Outpatient Medications   Medication Sig    rosuvastatin (CRESTOR) 10 mg tablet TAKE 1 TABLET BY MOUTH EVERY DAY AT NIGHT    Synjardy XR 10-1,000 mg TBph TAKE 1 TABLET BY MOUTH EVERY DAY IN THE MORNING WITH BREAKFAST    TURMERIC PO Take  by mouth.  MAGNESIUM GLYCINATE PO Take 250 mg by mouth daily.  acetaminophen/diphenhydramine (TYLENOL PM PO) Take  by mouth nightly as needed.  meclizine (ANTIVERT) 25 mg tablet Take  by mouth three (3) times daily as needed for Dizziness.  diclofenac (VOLTAREN) 1 % gel Apply  to affected area four (4) times daily as needed for Pain. 2%     Lactobacillus acidophilus (PROBIOTIC ACIDOPHILUS PO) Take 1 Capsule by mouth daily.  Victoza 3-Manolo 0.6 mg/0.1 mL (18 mg/3 mL) pnij INJECT 1.8 MG (0.3 ML) SUBCUTANEOUSLY DAILY (Patient taking differently: 0.6 mg. INJECT 0.6 MG (0.3 ML) SUBCUTANEOUSLY DAILY)    fluticasone propionate (FLONASE NA) by Nasal route.  pen needle, diabetic (NovoTwist) 32 gauge x 1/5\" ndle USE ONCE DAILY    glucose blood VI test strips (Accu-Chek Nayeli Plus test strp) strip PER INSURANCE PREFERENCE, TEST 1X/D DX DIABETES E11.9    cholecalciferol (VITAMIN D3) (2,000 UNITS /50 MCG) cap capsule Take 2,000 Units by mouth daily.  krill-om-3-dha-epa-phospho-ast (MEGARED OMEGA-3 KRILL OIL) 1,000-230-60 mg cap Take 1 Cap by mouth daily.  anastrozole (ARIMIDEX) 1 mg tablet Take 1 mg by mouth daily. Indications: hormone receptor positive breast cancer (Patient not taking: Reported on 5/5/2022)     No current facility-administered medications for this visit.           ALLERGIES     Allergies   Allergen Reactions    Succinylcholine Other (comments)     Prolonged period of recovery following anesthesia    Glipizide Other (comments)     Frequent hypoglycemia    Other Medication Diarrhea     Intolerant of >1000 mg of Metformin    Phentermine Other (comments)     Dizziness and \"crashes\"          SOCIAL HISTORY     Social History     Tobacco Use    Smoking status: Former Smoker     Packs/day: 0.00     Years: 10.00     Pack years: 0.00     Types: Cigarettes     Quit date: 1991     Years since quittin.0    Smokeless tobacco: Never Used   Substance Use Topics    Alcohol use: Yes     Alcohol/week: 14.0 standard drinks     Types: 10 Glasses of wine, 4 Standard drinks or equivalent per week     Comment: 1-2 glasses of wine with dinner 5 days a week     Social History     Social History Narrative    Initial PPV23  age 59; will need PCV 13 at age 71 yo and PPSV 21 again at 74 yo        Diet: Noom Diet since     Exercise: since  walks 3-5 x a week x 20 minutes; has increased this to daily x 20-30 minutes    Caffeine: 2-3 cups of half caff coffee a day, tea at lunch a few x a week, Diet Coke maybe once a month    Weight: happy her wt has been gradually coming down w/ eating healthier over the years and increased exercise the past few months; down from the 240 lb range in 2019 to 198-200 range now        Lives in the [place] [address] with . Has 2 sons and 1 daughter. Works for [employer] in [facility]. Likes to garden and music, theater and reading.            Social History     Substance and Sexual Activity   Sexual Activity Yes    Partners: Male    Birth control/protection: None       IMMUNIZATIONS     Immunization History   Administered Date(s) Administered    COVID-19, Moderna Booster, PF, 0.25mL Dose 10/30/2021    COVID-19, Aurther Dyers, Primary or Immunocompromised Series, MRNA, PF, 100mcg/0.5mL 10/30/2021    COVID-19, Pfizer Purple top, DILUTE for use, 12+ yrs, 30mcg/0.3mL dose 2021, 2021    Influenza Vaccine 10/16/2014, 10/22/2015, 2017, 2018, 10/24/2019, 2021    Influenza Vaccine (>6 mo Afluria QUAD Vial 85359 (0.25 mL) / 49808 (0.5 mL)) 10/01/2018    Influenza Vaccine (Quad) Mdck Pf (>2 Yrs Flucelvax QUAD 21024) 10/16/2018    Influenza Vaccine [employer]) PF (>6 Mo Flulaval, Fluarix, and >3 Yrs Afluria, Fluzone 97805) 10/24/2019, 2020    Influenza Vaccine Split 09/01/2009, 11/01/2012    Pneumococcal Polysaccharide (PPSV-23) 04/19/2018    TD Vaccine 06/05/2012    Zoster Recombinant 06/12/2019, 10/17/2019         FAMILY HISTORY     Family History   Problem Relation Age of Onset    COPD Mother         smoker    Colon Polyps Mother 72    Osteoporosis Mother     Lung Disease Mother         COPD    Delayed Awakening Mother         No dx; possible    Anesth Problems Mother         DELAYED AWAKENING    Stroke Father         Mini strokes    Dementia Father         Alzheimer's    Heart Disease Father         PVD    Alcohol abuse Father     Thyroid Disease Sister         hypothyroidism    SKIN CANCER Sister         melanoma    Diabetes Maternal Grandmother     Lung Cancer Maternal Grandfather     Cancer Maternal Grandfather         Lung    Hypertension Brother         living in his 66's    Cancer Sister         Skin -melanoma, sq, bas.  Cancer Sister         stage 0 melanoma    Diabetes Maternal Uncle          VITALS     Visit Vitals  /68 (BP 1 Location: Left upper arm, BP Patient Position: Sitting, BP Cuff Size: Large adult)   Pulse 78   Temp 98.5 °F (36.9 °C) (Oral)   Resp 16   Ht 5' 7\" (1.702 m)   Wt 202 lb (91.6 kg)   SpO2 98%   BMI 31.64 kg/m²          PHYSICAL EXAMINATION   Physical Exam  Vitals reviewed. Constitutional:       General: She is not in acute distress. Appearance: Normal appearance. She is not ill-appearing. Abdominal:      General: There is no distension. Palpations: Abdomen is soft. Tenderness: There is no abdominal tenderness. There is no right CVA tenderness or left CVA tenderness.    Genitourinary:     Comments: Exam deferred               LABORATORY DATA/ANCILLARY/IMAGING     Results for orders placed or performed in visit on 05/05/22   AMB POC URINALYSIS DIP STICK AUTO W/O MICRO   Result Value Ref Range    Color (UA POC) Yellow     Clarity (UA POC) Slightly Cloudy     Glucose (UA POC) 2+ Negative Bilirubin (UA POC) Negative Negative    Ketones (UA POC) Negative Negative    Specific gravity (UA POC) 1.020 1.001 - 1.035    Blood (UA POC) 2+ Negative    pH (UA POC) 5.5 4.6 - 8.0    Protein (UA POC) 1+ Negative    Urobilinogen (UA POC) 0.2 mg/dL 0.2 - 1    Nitrites (UA POC) Positive Negative    Leukocyte esterase (UA POC) Trace Negative       Lab Results   Component Value Date/Time    Hemoglobin A1c 6.0 (H) 03/24/2022 11:15 AM    Hemoglobin A1c 6.4 (H) 06/14/2021 08:53 AM    Hemoglobin A1c 6.3 (H) 12/22/2020 10:04 AM    Glucose 124 (H) 03/24/2022 11:23 AM    Glucose (POC) 106 04/14/2022 03:21 PM    Microalbumin/Creat ratio (mg/g creat) 9 03/08/2022 09:12 AM    Microalbumin,urine random 1.28 03/08/2022 09:12 AM    LDL, calculated 56.6 12/06/2021 08:50 AM    Creatinine 0.75 03/24/2022 11:23 AM      Lab Results   Component Value Date/Time    GFR est non-AA >60 03/24/2022 11:23 AM    GFR est AA >60 03/24/2022 11:23 AM    Creatinine 0.75 03/24/2022 11:23 AM    BUN 19 03/24/2022 11:23 AM    Sodium 139 03/24/2022 11:23 AM    Potassium 4.1 03/24/2022 11:23 AM    Chloride 109 (H) 03/24/2022 11:23 AM    CO2 24 03/24/2022 11:23 AM         ASSESSMENT & PLAN     1. Urinary urgency  -     AMB POC URINALYSIS DIP STICK AUTO W/O MICRO    2. Dysuria     3. Acute UTI  -     CULTURE, URINE; Future  -     nitrofurantoin, macrocrystal-monohydrate, (MACROBID) 100 mg capsule; Take 1 Capsule by mouth two (2) times a day for 5 days. 4. Vaginal yeast infection  -     terconazole (TERAZOL 7) 0.4 % vaginal cream; Insert 1 Applicator into vagina nightly for 7 days. Urine sent for C&S  Start Macrobid 100 mg bid and Terazol Cream qhs x 7  Increase intake of water and cranberry juice. Sugar free diet. Minimize caffeine intake  Further follow up & other recommendations pending review of Urine C&S that was sent out today.   Otherwise follow up if symptoms persist beyond expectant course or sooner if anything worsens in the interim

## 2022-05-05 NOTE — PROGRESS NOTES
Chief Complaint   Patient presents with    Urinary Frequency    Urinary Pain     1. \"Have you been to the ER, urgent care clinic since your last visit? Hospitalized since your last visit? \" No    2. \"Have you seen or consulted any other health care providers outside of the [address] [place] [address] since your last visit? \" Yes Where: oncologist Dr Suleiman Gaona, gyn Dr Reddy Collazo Dr   3. For patients aged 39-70: Has the patient had a colonoscopy / FIT/ Cologuard? 11/2021 Diverticulosis, incomplete study due to poor prep, Dr Shira Donald repeat 1 yr      If the patient is female:    3. For patients aged 41-77: Has the patient had a mammogram within the past 2 years? 2/2022      5. For patients aged 21-65: Has the patient had a pap smear?  Dr Anatoliy Chisholm 2/1/22

## 2022-05-06 ENCOUNTER — HOSPITAL ENCOUNTER (OUTPATIENT)
Dept: RADIATION THERAPY | Age: 62
Discharge: HOME | End: 2022-05-06

## 2022-05-11 LAB — BACTERIA UR CULT: ABNORMAL

## 2022-05-12 DIAGNOSIS — N39.0 ACUTE UTI: Primary | ICD-10-CM

## 2022-05-17 ENCOUNTER — NURSE NAVIGATOR (OUTPATIENT)
Dept: CASE MANAGEMENT | Age: 62
End: 2022-05-17

## 2022-05-17 NOTE — PROGRESS NOTES
[address]  Breast Navigator Encounter    Name:    Laban Mcardle  Age:    59 y.o. Diagnosis:    LEFT breast cancer    Interdisciplinary Team:  Med-Onc:    Dr. Zee Maria  Surg-Onc:    Dr. Lalitha Villegasman:    Dr. Jeanine Reyes  Plastics:      :      Nurse Navigator:  Gabi White RN, BSN, [facility]      Encounter type:  [x]Patient Initiated  []Navigator Follow-up []Pre-op  []Post-op  []Check-in Prior to First Treatment []Treatment Modality Change  []Survivorship Transition []Other:       Narrative:    Called frustrated about length of time it is taking to get her XRT scheduled. Has a big vacation on 6/24 and really would like to be able to go. Had simulation last Thursday. Spoke to Ailyn at [employer]. She said that hopefully they will have news next week about a starting date for the patient. The plan is still in the physicist's hands. Called patient and left this message on her voicemail. She called back to say that she had heard from Cornelia Vale at the [facility] location. They are going to be able to start her XRT tomorrow! This will go for four weeks until 6/15. She was very happy! I will continue to follow her.           Gabi White RN, BSN, [facility]  Oncology Breast Navigator     [address]  [address] [place] [address] [facility]  W: [phone]  F: [phone]  [email or web address]  Good Help to Those in [facility]

## 2022-05-18 ENCOUNTER — APPOINTMENT (OUTPATIENT)
Dept: FAMILY MEDICINE CLINIC | Age: 62
End: 2022-05-18

## 2022-05-18 ENCOUNTER — HOSPITAL ENCOUNTER (OUTPATIENT)
Dept: RADIATION THERAPY | Age: 62
Discharge: HOME | End: 2022-05-18

## 2022-05-19 ENCOUNTER — HOSPITAL ENCOUNTER (OUTPATIENT)
Dept: RADIATION THERAPY | Age: 62
Discharge: HOME | End: 2022-05-19

## 2022-05-20 ENCOUNTER — HOSPITAL ENCOUNTER (OUTPATIENT)
Dept: RADIATION THERAPY | Age: 62
Discharge: HOME | End: 2022-05-20

## 2022-05-23 ENCOUNTER — HOSPITAL ENCOUNTER (OUTPATIENT)
Dept: RADIATION THERAPY | Age: 62
Discharge: HOME | End: 2022-05-23

## 2022-05-24 ENCOUNTER — HOSPITAL ENCOUNTER (OUTPATIENT)
Dept: RADIATION THERAPY | Age: 62
Discharge: HOME | End: 2022-05-24

## 2022-05-24 LAB
APPEARANCE UR: CLEAR
BACTERIA #/AREA URNS HPF: ABNORMAL /[HPF]
BACTERIA UR CULT: ABNORMAL
BILIRUB UR QL STRIP: NEGATIVE
CASTS URNS QL MICRO: ABNORMAL /LPF
COLOR UR: YELLOW
EPI CELLS #/AREA URNS HPF: ABNORMAL /HPF (ref 0–10)
GLUCOSE UR QL STRIP: ABNORMAL
HGB UR QL STRIP: NEGATIVE
KETONES UR QL STRIP: NEGATIVE
LEUKOCYTE ESTERASE UR QL STRIP: ABNORMAL
MICRO URNS: ABNORMAL
NITRITE UR QL STRIP: NEGATIVE
PH UR STRIP: 5.5 [PH] (ref 5–7.5)
PROT UR QL STRIP: NEGATIVE
RBC #/AREA URNS HPF: ABNORMAL /HPF (ref 0–2)
SP GR UR STRIP: 1.01 (ref 1–1.03)
URINALYSIS REFLEX, 377202: ABNORMAL
UROBILINOGEN UR STRIP-MCNC: 0.2 MG/DL (ref 0.2–1)
WBC #/AREA URNS HPF: >30 /HPF (ref 0–5)

## 2022-05-25 ENCOUNTER — HOSPITAL ENCOUNTER (OUTPATIENT)
Dept: RADIATION THERAPY | Age: 62
Discharge: HOME | End: 2022-05-25

## 2022-05-26 ENCOUNTER — HOSPITAL ENCOUNTER (OUTPATIENT)
Dept: RADIATION THERAPY | Age: 62
Discharge: HOME | End: 2022-05-26

## 2022-05-27 ENCOUNTER — HOSPITAL ENCOUNTER (OUTPATIENT)
Dept: RADIATION THERAPY | Age: 62
Discharge: HOME | End: 2022-05-27

## 2022-05-30 DIAGNOSIS — N39.0 RECURRENT UTI: Primary | ICD-10-CM

## 2022-05-30 DIAGNOSIS — E11.9 DIABETES MELLITUS TYPE 2, NONINSULIN DEPENDENT (HCC): ICD-10-CM

## 2022-05-30 RX ORDER — METFORMIN HYDROCHLORIDE 500 MG/1
1000 TABLET, EXTENDED RELEASE ORAL
Qty: 180 TABLET | Refills: 0 | Status: SHIPPED | OUTPATIENT
Start: 2022-05-30 | End: 2022-08-22

## 2022-05-30 RX ORDER — CEFUROXIME AXETIL 500 MG/1
500 TABLET ORAL 2 TIMES DAILY
Qty: 10 TABLET | Refills: 0 | Status: SHIPPED | OUTPATIENT
Start: 2022-05-30 | End: 2022-06-04

## 2022-05-31 ENCOUNTER — HOSPITAL ENCOUNTER (OUTPATIENT)
Dept: RADIATION THERAPY | Age: 62
Discharge: HOME | End: 2022-05-31

## 2022-06-01 ENCOUNTER — HOSPITAL ENCOUNTER (OUTPATIENT)
Dept: RADIATION THERAPY | Age: 62
Discharge: HOME | End: 2022-06-01

## 2022-06-02 ENCOUNTER — HOSPITAL ENCOUNTER (OUTPATIENT)
Dept: RADIATION THERAPY | Age: 62
Discharge: HOME | End: 2022-06-02

## 2022-06-03 ENCOUNTER — HOSPITAL ENCOUNTER (OUTPATIENT)
Dept: RADIATION THERAPY | Age: 62
Discharge: HOME | End: 2022-06-03

## 2022-06-03 DIAGNOSIS — R94.6 BORDERLINE ABNORMAL THYROID FUNCTION TEST: Primary | ICD-10-CM

## 2022-06-03 DIAGNOSIS — E04.2 MULTINODULAR GOITER: ICD-10-CM

## 2022-06-06 ENCOUNTER — HOSPITAL ENCOUNTER (OUTPATIENT)
Dept: RADIATION THERAPY | Age: 62
Discharge: HOME | End: 2022-06-06

## 2022-06-07 ENCOUNTER — HOSPITAL ENCOUNTER (OUTPATIENT)
Dept: RADIATION THERAPY | Age: 62
Discharge: HOME | End: 2022-06-07

## 2022-06-08 ENCOUNTER — HOSPITAL ENCOUNTER (OUTPATIENT)
Dept: RADIATION THERAPY | Age: 62
Discharge: HOME | End: 2022-06-08

## 2022-06-09 ENCOUNTER — HOSPITAL ENCOUNTER (OUTPATIENT)
Dept: RADIATION THERAPY | Age: 62
Discharge: HOME | End: 2022-06-09

## 2022-06-10 ENCOUNTER — HOSPITAL ENCOUNTER (OUTPATIENT)
Dept: ULTRASOUND IMAGING | Age: 62
Discharge: HOME | End: 2022-06-10
Attending: INTERNAL MEDICINE
Payer: COMMERCIAL

## 2022-06-10 ENCOUNTER — HOSPITAL ENCOUNTER (OUTPATIENT)
Dept: RADIATION THERAPY | Age: 62
Discharge: HOME | End: 2022-06-10

## 2022-06-10 ENCOUNTER — LAB ONLY (OUTPATIENT)
Dept: FAMILY MEDICINE CLINIC | Age: 62
End: 2022-06-10

## 2022-06-10 DIAGNOSIS — E04.2 MULTINODULAR GOITER: ICD-10-CM

## 2022-06-10 DIAGNOSIS — N39.0 RECURRENT UTI: Primary | ICD-10-CM

## 2022-06-10 LAB
APPEARANCE UR: CLEAR
BACTERIA URNS QL MICRO: NEGATIVE /HPF
BILIRUB UR QL: NEGATIVE
COLOR UR: ABNORMAL
EPITH CASTS URNS QL MICRO: ABNORMAL /LPF
GLUCOSE UR STRIP.AUTO-MCNC: NEGATIVE MG/DL
HGB UR QL STRIP: ABNORMAL
HYALINE CASTS URNS QL MICRO: ABNORMAL /LPF (ref 0–5)
KETONES UR QL STRIP.AUTO: NEGATIVE MG/DL
LEUKOCYTE ESTERASE UR QL STRIP.AUTO: NEGATIVE
NITRITE UR QL STRIP.AUTO: NEGATIVE
PH UR STRIP: 7 [PH] (ref 5–8)
PROT UR STRIP-MCNC: NEGATIVE MG/DL
RBC #/AREA URNS HPF: ABNORMAL /HPF (ref 0–5)
SP GR UR REFRACTOMETRY: 1.01 (ref 1–1.03)
UROBILINOGEN UR QL STRIP.AUTO: 0.2 EU/DL (ref 0.2–1)
WBC URNS QL MICRO: ABNORMAL /HPF (ref 0–4)

## 2022-06-10 PROCEDURE — 76536 US EXAM OF HEAD AND NECK: CPT

## 2022-06-11 DIAGNOSIS — E04.2 MULTINODULAR GOITER: Primary | ICD-10-CM

## 2022-06-12 LAB
BACTERIA SPEC CULT: ABNORMAL
BACTERIA SPEC CULT: ABNORMAL
CC UR VC: ABNORMAL
SERVICE CMNT-IMP: ABNORMAL

## 2022-06-13 ENCOUNTER — HOSPITAL ENCOUNTER (OUTPATIENT)
Dept: RADIATION THERAPY | Age: 62
Discharge: HOME | End: 2022-06-13

## 2022-06-13 ENCOUNTER — TELEPHONE (OUTPATIENT)
Dept: ENDOCRINOLOGY | Age: 62
End: 2022-06-13

## 2022-06-13 DIAGNOSIS — N39.0 RECURRENT UTI: Primary | ICD-10-CM

## 2022-06-13 RX ORDER — NITROFURANTOIN 25; 75 MG/1; MG/1
100 CAPSULE ORAL 2 TIMES DAILY
Qty: 14 CAPSULE | Refills: 0 | Status: SHIPPED | OUTPATIENT
Start: 2022-06-13 | End: 2022-06-20

## 2022-06-13 NOTE — TELEPHONE ENCOUNTER
Louise Rowley with Radiology called to confirm that Dr. Megan Sanchez had reviewed US thyroid /parathyroid that was done on 6/10/22. I informed her that he had already seen the results.

## 2022-06-14 ENCOUNTER — HOSPITAL ENCOUNTER (OUTPATIENT)
Dept: RADIATION THERAPY | Age: 62
Discharge: HOME | End: 2022-06-14

## 2022-06-15 ENCOUNTER — HOSPITAL ENCOUNTER (OUTPATIENT)
Dept: RADIATION THERAPY | Age: 62
Discharge: HOME | End: 2022-06-15

## 2022-06-20 DIAGNOSIS — E11.9 DIABETES MELLITUS TYPE 2, NONINSULIN DEPENDENT (HCC): ICD-10-CM

## 2022-06-20 RX ORDER — METFORMIN HYDROCHLORIDE 500 MG/1
1000 TABLET, EXTENDED RELEASE ORAL
Qty: 180 TABLET | Refills: 0 | OUTPATIENT
Start: 2022-06-20

## 2022-06-20 RX ORDER — PEN NEEDLE, DIABETIC 30 GX 1/3"
NEEDLE, DISPOSABLE MISCELLANEOUS
Qty: 100 PEN NEEDLE | Refills: 3 | Status: SHIPPED | OUTPATIENT
Start: 2022-06-20

## 2022-07-07 ENCOUNTER — HOSPITAL ENCOUNTER (OUTPATIENT)
Dept: NUCLEAR MEDICINE | Age: 62
Discharge: HOME | End: 2022-07-07
Attending: INTERNAL MEDICINE
Payer: COMMERCIAL

## 2022-07-07 DIAGNOSIS — E04.2 MULTINODULAR GOITER: ICD-10-CM

## 2022-07-07 PROCEDURE — 78014 THYROID IMAGING W/BLOOD FLOW: CPT

## 2022-07-07 RX ORDER — SODIUM IODIDE I 123 200 UCI/1
200 CAPSULE, GELATIN COATED ORAL ONCE
Status: COMPLETED | OUTPATIENT
Start: 2022-07-07 | End: 2022-07-07

## 2022-07-07 RX ADMIN — SODIUM IODIDE I 123 297 MICRO CURIE: 200 CAPSULE, GELATIN COATED ORAL at 08:35

## 2022-07-08 ENCOUNTER — HOSPITAL ENCOUNTER (OUTPATIENT)
Dept: NUCLEAR MEDICINE | Age: 62
Discharge: HOME | End: 2022-07-08
Attending: INTERNAL MEDICINE

## 2022-07-11 ENCOUNTER — TELEPHONE (OUTPATIENT)
Dept: ENDOCRINOLOGY | Age: 62
End: 2022-07-11

## 2022-07-11 NOTE — TELEPHONE ENCOUNTER
7/11/2022  9:24 AM    John Smith from [facility] radiology left message at 8.59 stating that she was calling to check to see if dr. Palak Verduzco got the thyroid test results for alex red.   Please call her back at [phone]

## 2022-07-11 NOTE — TELEPHONE ENCOUNTER
Informed Jasmin Ramsey from [facility] radiology that Dr Katarzyna Smith has reviewed the scan and contacted the pt with results. Jasmin Ramsey verbalized understanding.

## 2022-08-03 ENCOUNTER — VIRTUAL VISIT (OUTPATIENT)
Dept: ONCOLOGY | Age: 62
End: 2022-08-03
Payer: COMMERCIAL

## 2022-08-03 DIAGNOSIS — Z98.890 HISTORY OF LUMPECTOMY OF LEFT BREAST: ICD-10-CM

## 2022-08-03 DIAGNOSIS — Z79.811 AROMATASE INHIBITOR USE: ICD-10-CM

## 2022-08-03 DIAGNOSIS — E11.9 DIABETES MELLITUS TYPE 2, NONINSULIN DEPENDENT (HCC): ICD-10-CM

## 2022-08-03 DIAGNOSIS — C50.912 INFILTRATING DUCTAL CARCINOMA OF LEFT BREAST (HCC): Primary | ICD-10-CM

## 2022-08-03 PROCEDURE — 3044F HG A1C LEVEL LT 7.0%: CPT | Performed by: INTERNAL MEDICINE

## 2022-08-03 PROCEDURE — 99214 OFFICE O/P EST MOD 30 MIN: CPT | Performed by: INTERNAL MEDICINE

## 2022-08-03 NOTE — PROGRESS NOTES
[facility], [id number] [place] [address] Road, [address] [place] [address] Martha  W: [phone]  F: [phone]    Reason for Visit:   Sonja Colvin is a 59 y.o. female who is seen by synchronous (real-time) audio-video technology for follow up of breast cancer. Treatment History:   Breast biopsy 2/22  Lumpectomy 4/22  Radiation done 6/15/22  Started AI 7/4/22. STAGE: T1cN0 ER+ [id number]    History of Present Illness:     Pt seen today virtually for fu per pt preference. Finished XRT 6/22. Started AI and tolerating fine so far. No big issues. Noticed some little things like knee problems not new. Walks a lot. Has occ hot flashes. No fevers/ chills/ chest pain/ SOB/ nausea/ vomiting/diarrhea/ pain/fatigue      Last visit:  office consult for new breast cancer ER+ Her2 negative post lumpectomy 4/22. Initially pt had abnormal mammo 12/21 with [facility]. LEFT breast abnormality. 02/02/22: LEFT breast bx. PATH: IDC, largest focus measures 10mm, low grade, ER+(99%)/PA+(45%)/HER2-, Ki-67 15%. Clinical stage 1. MammaPrint: Low-risk, luminal A-type, +0.208. Had breast MRI with solitary small mass. 03/31/22: LEFT breast lumpectomy and SLNBx. PATH: IDC with lobular features, 17mm, grade 1, ER+(99%)/PA+(45%)/HER2-, Ki-67 15%, positive margins, 4 benign LN (0/4). LCIS present. Pathologic stage: pT1c, pN0.      04/14/22: LEFT breast lumpectomy re-excision of lateral margin. PATH: Benign breast tissue with extensive fat necrosis and focal usual duct hyperplasia. No evidence of carcinoma. Pt did well with surgery. Has DM/ thyroid disease/ high cholesterol. Feels well today. Going on vacation 6/24/22 to beach.    No fevers/ chills/ chest pain/ SOB/ nausea/ vomiting/diarrhea/ pain/fatigue    FamHx no breast cancer      Past Medical History:   Diagnosis Date    Bone spur of ankle 2018    Bone spurs of feet  2/24/2017    Podiatry Dr Tameka Chen    [facility] type 2[facility] 12/10/2015    3/2001    Diverticulosis age 26    FH: melanoma     also FH: BCC    Gestational diabetes 1998    Heel spur, left 9/19/2018 2/2018, Dr Bill Nuñez    Hemorrhoids     Hiatal hernia with GERD and esophagitis 3/26/2020    EGD 2016    Hiatal Hernia, Gastritis, mild; BX neg for H. Pylori 4/2010 4/27/2010    History of abnormal Pap smear, s/p Cryotherapy in her 30's 12/5/2012    History of SCC (squamous cell carcinoma) of skin 1/9/2019    Skin checks and cancer screenings per Isaiah.  Dr. Anali Vale    History of skin cancer     Squamous cell skin; breast (2022)    Hx of colonoscopy 7/11/2016    [facility] Dr. Diane Ahumada    Left foot pain 2009; s/p CSI 4/27/2010    Left sided sciatica 12/5/2012    Meniere's disease     Mixed hyperlipidemia 3/26/2020    Multinodular goiter 2/2/2022 2021 Endo Dr. Caldwell Older    Nausea & vomiting     Postmenopause, LMP age ~ 45 yo, No HRT 12/5/2012    Pseudocholinesterase deficiency 1986    Prolonged period of recovery    Right knee pain; damage at medial meniscus; 2010 4/27/2010    S/p colonoscopy with polypectomy 4/2010 4/27/2010    Stress fracture foot     left    Stress incontinence 12/5/2012    Tendonitis, Achilles, left 1/9/2019    CSI Dr Bill Nuñez     Tendonitis, Achilles, right 4/19/2018 2-2018: Dr Bill Nuñez, bone spur chipped    Uterine fibroid     Vitamin D deficiency 12/11/2013 12/2013       Past Surgical History:   Procedure Laterality Date    HX BREAST BIOPSY Right 06/2014    Benign, Jacey Shearing    HX BREAST LUMPECTOMY   1986    benign fibroadenoma right breast    HX BREAST LUMPECTOMY Left 3/31/2022    LEFT BREAST LUMPECTOMY WITH MAGSEED, LEFT BREAST SENTINEL NODE BIOPSY (PSEUDOCHOLINESTERASE DEFICIENCY) performed by Blaine Osman MD at [place] [address]    HX BREAST LUMPECTOMY Left 4/14/2022    RE EXCISION LATERAL MARGIN LEFT BREAST LUMPECTOMY performed by Blaine Osman MD at [place] [address] [address]    Emergency for fetal distress    HX COLONOSCOPY  ~    Dr Sunshine Wilkes; Normal except Diverticulosis, but f/u 5 yrs d/t Fhx    HX COLONOSCOPY  2016    Polyps, Multiple Diverticulae, Internal Hemorrhoids; f/u 5 yrs, Dr Sunshine Wilkes    HX COLONOSCOPY  2021    Diverticulosis, incomplete study due to poor prep, repeat 1 yr    HX DILATION AND CURETTAGE  10/8/14    AUB & Uterine Polyp (HYSTEROSCOPY)    HX ENDOSCOPY  2016    EGD: Hiatal Hernia, Grade 1 Esophagitis w/ reflux, Gastritis,  Dr Sunshine Wilkes    HX ENDOSCOPY  2021    HX HEENT  ; Dr. Lennox Learn    Tonsillectomy; anesthesia reaction    HX LAP CHOLECYSTECTOMY      gallstones    HX SKIN BIOPSY  ~    SCC excised from chest wall, Dr. Preston Turner HYSTEROSCOPY,W/ENDO BX  2014    AUB; Negative      Social History     Tobacco Use    Smoking status: Former     Packs/day: 0.00     Years: 10.00     Pack years: 0.00     Types: Cigarettes     Quit date: 1991     Years since quittin.2    Smokeless tobacco: Never   Substance Use Topics    Alcohol use:  Yes     Alcohol/week: 14.0 standard drinks     Types: 10 Glasses of wine, 4 Standard drinks or equivalent per week     Comment: 1-2 glasses of wine with dinner 5 days a week      Family History   Problem Relation Age of Onset    COPD Mother         smoker    Colon Polyps Mother 72    Osteoporosis Mother     Lung Disease Mother         COPD    Delayed Awakening Mother         No dx; possible    Anesth Problems Mother         DELAYED AWAKENING    Stroke Father         Mini strokes    Dementia Father         Alzheimer's    Heart Disease Father         PVD    Alcohol abuse Father     Thyroid Disease Sister         hypothyroidism    SKIN CANCER Sister         melanoma    Diabetes Maternal Grandmother     Lung Cancer Maternal Grandfather     Cancer Maternal Grandfather         Lung    Hypertension Brother         living in his 66's Cancer Sister         Skin -melanoma, sq, bas. Cancer Sister         stage 0 melanoma    Diabetes Maternal Uncle      Current Outpatient Medications   Medication Sig    pen needle, diabetic (NovoTwist) 32 gauge x 1/5\" ndle USE ONCE DAILY AS DIRECTED    metFORMIN ER (GLUCOPHAGE XR) 500 mg tablet Take 2 Tablets by mouth daily (with dinner). anastrozole (ARIMIDEX) 1 mg tablet Take 1 mg by mouth daily. Indications: hormone receptor positive breast cancer (Patient not taking: Reported on 5/5/2022)    rosuvastatin (CRESTOR) 10 mg tablet TAKE 1 TABLET BY MOUTH EVERY DAY AT NIGHT    TURMERIC PO Take  by mouth. MAGNESIUM GLYCINATE PO Take 250 mg by mouth daily. acetaminophen/diphenhydramine (TYLENOL PM PO) Take  by mouth nightly as needed. meclizine (ANTIVERT) 25 mg tablet Take  by mouth three (3) times daily as needed for Dizziness. diclofenac (VOLTAREN) 1 % gel Apply  to affected area four (4) times daily as needed for Pain. 2%     Lactobacillus acidophilus (PROBIOTIC ACIDOPHILUS PO) Take 1 Capsule by mouth daily. Victoza 3-Manolo 0.6 mg/0.1 mL (18 mg/3 mL) pnij INJECT 1.8 MG (0.3 ML) SUBCUTANEOUSLY DAILY (Patient taking differently: 0.6 mg. INJECT 0.6 MG (0.3 ML) SUBCUTANEOUSLY DAILY)    fluticasone propionate (FLONASE NA) by Nasal route. glucose blood VI test strips (Accu-Chek Nayeli Plus test strp) strip PER INSURANCE PREFERENCE, TEST 1X/D DX DIABETES E11.9    cholecalciferol (VITAMIN D3) (2,000 UNITS /50 MCG) cap capsule Take 2,000 Units by mouth daily. krill-om-3-dha-epa-phospho-ast (MEGARED OMEGA-3 KRILL OIL) 1,000-230-60 mg cap Take 1 Cap by mouth daily. No current facility-administered medications for this visit.       Allergies   Allergen Reactions    Succinylcholine Other (comments)     Prolonged period of recovery following anesthesia    Glipizide Other (comments)     Frequent hypoglycemia    Other Medication Diarrhea     Intolerant of >1000 mg of Metformin    Phentermine Other (comments)     Dizziness and \"crashes\"        A complete review of systems was obtained, negative except as described above and as reported on ROS sheet scanned into system. Physical Exam:     There were no vitals taken for this visit. General: alert, cooperative, no distress   Mental  status: normal mood, behavior, speech, dress, motor activity, and thought processes, able to follow commands   HENT: NCAT   Neck: no visualized mass   Resp: no respiratory distress   Neuro: no gross deficits   Skin: no discoloration or lesions of concern on visible areas   Psychiatric: normal affect, consistent with stated mood, no evidence of hallucinations       Due to this being a TeleHealth evaluation (During DVTCT-21 public health emergency), many elements of the physical examination are unable to be assessed. Evaluation of the following organ systems was limited: Vitals/Constitutional/EENT/Resp/CV/GI//MS/Neuro/Skin/Heme-Lymph-Imm. Results:     Lab Results   Component Value Date/Time    WBC 7.9 03/24/2022 11:23 AM    HGB 14.4 03/24/2022 11:23 AM    HCT 44.2 03/24/2022 11:23 AM    PLATELET 623 11/01/6664 11:23 AM    MCV 92.5 03/24/2022 11:23 AM    ABS. NEUTROPHILS 4.3 03/24/2022 11:23 AM     Lab Results   Component Value Date/Time    Sodium 139 03/24/2022 11:23 AM    Potassium 4.1 03/24/2022 11:23 AM    Chloride 109 (H) 03/24/2022 11:23 AM    CO2 24 03/24/2022 11:23 AM    Glucose 124 (H) 03/24/2022 11:23 AM    BUN 19 03/24/2022 11:23 AM    Creatinine 0.75 03/24/2022 11:23 AM    GFR est AA >60 03/24/2022 11:23 AM    GFR est non-AA >60 03/24/2022 11:23 AM    Calcium 9.1 03/24/2022 11:23 AM    Glucose (POC) 106 04/14/2022 03:21 PM     Lab Results   Component Value Date/Time    Bilirubin, total 0.5 12/22/2020 10:04 AM    ALT (SGPT) 30 12/06/2021 08:50 AM    Alk.  phosphatase 73 12/22/2020 10:04 AM    Protein, total 7.1 12/22/2020 10:04 AM    Albumin 4.1 12/22/2020 10:04 AM    Globulin 3.0 12/22/2020 10:04 AM     MRI Results (most recent):  Results from Hospital Encounter encounter on 02/22/22    MRI BREAST BI W WO CONT    Narrative  HISTORY: 57-year-old female with newly diagnosed left breast cancer, presenting  for preoperative breast MRI. COMPARISON: Mammography from February and January 2022, as well as December 2021. No prior MRI. TECHNIQUE:  Bilateral breast MRI was performed using a dedicated breast coil without  compression with the patient in the prone position. Precontrast T1-weighted  images with fat suppression were obtained followed by bolus injection of 9 mL  Gadavist. Postcontrast dynamic and high-resolution images were acquired. T2-weighted axial imaging with fat suppression was also performed. The images  were analyzed using CAD analysis, enhancement curves, digital subtraction, and 2  and 3 dimensional reconstructions. FINDINGS:    Background parenchymal enhancement: Mild    Mammographic breast density: Scattered fibroglandular breast tissue    Right breast:  There is no suspicious mass or mass enhancement within the right breast. There  is no skin thickening or nipple retraction. There is no suspicious axillary or internal mammary chain lymphadenopathy. Left breast:  Within the middle third of the left breast upper inner quadrant, there is  evidence of a biopsy clip, and associated biopsy tract, and ill-defined  malignant enhancement measuring up to 1 x 1.2 x 1.2 cm. There is mixed internal  kinetics. This is consistent with newly diagnosed breast carcinoma. There is no  other suspicious enhancement within the left breast. There is no evidence of  multifocal or multicentric disease. There is no skin thickening or nipple  retraction. There is no suspicious axillary or internal mammary chain lymphadenopathy. Impression  Right Breast:  1. BI-RADS Assessment Category 1: Negative. No evidence of breast carcinoma  within the right breast.    Left Breast:  1.  BI-RADS Assessment Category 6: Known biopsy proven malignancy- Appropriate  action should be taken. 1.2 cm of malignant masslike enhancement and associated  biopsy clip, located in the middle third of the left breast upper inner  quadrant, consistent with newly diagnosed breast carcinoma. 2. No evidence of multicentric or multifocal disease. 3. No suspicious lymphadenopathy. RECOMMENDATIONS:  The newly diagnosed left breast malignancy appears amenable to breast  conservation therapy. There is no evidence of multifocal, multicentric, or  contralateral disease. A summary portfolio has been created in PACS. Records reviewed and summarized above. Pathology report(s) reviewed above. Radiology report(s) reviewed above. Assessment:/PLAN     1)  Stage 1 T1cN0 LEFT breast cancer ER+ Her2 negative mammaprint low risk post lumpectomy/ re excision 4/22. mammaprint low risk. No chemo needed  done radiation 6/22. Seen today virtually per pt preference. Doing well overall. No issues on self exam.   On adjuvant hormonal therapy with AI and tolerating this fine. Continue AI. Mammo last 2/22 and will set up for 2/23. Ordered. Labs and routine HM. Pt is healthy overall. 2) DM/ thyroid/ high cholesterol/ inner ear disorder. Per PCP/ endocrine. 3) Psychosocial. Mood good. Coping well. Works as  for [employer]. Walking daily. Does Noom. Has support. Fu here in 6 months, seeing breast surgery in 3 month  Call if questions    The patient was evaluated through a synchronous (real-time) audio-video encounter. The patient (or guardian if applicable) is aware that this is a billable service, which includes applicable co-pays. This Virtual Visit was conducted with patient's (and/or legal guardian's) consent.  The visit was conducted pursuant to the emergency declaration under the [address], [address] waiver authority and the [employer] and [employer] Appropriations Act. Patient identification was verified, and a caregiver was present when appropriate. The patient was located in a state where the provider was licensed to provide care. I appreciate the opportunity to participate in Ms. Anabelle Ortega's care.     Signed By: Tigist Dan,

## 2022-08-22 DIAGNOSIS — E11.9 DIABETES MELLITUS TYPE 2, NONINSULIN DEPENDENT (HCC): ICD-10-CM

## 2022-08-22 RX ORDER — METFORMIN HYDROCHLORIDE 500 MG/1
1000 TABLET, EXTENDED RELEASE ORAL
Qty: 180 TABLET | Refills: 0 | Status: SHIPPED | OUTPATIENT
Start: 2022-08-22 | End: 2022-10-09

## 2022-09-12 ENCOUNTER — HOSPITAL ENCOUNTER (OUTPATIENT)
Dept: RADIATION THERAPY | Age: 62
Discharge: HOME | End: 2022-09-12

## 2022-09-20 ENCOUNTER — OFFICE VISIT (OUTPATIENT)
Dept: FAMILY MEDICINE CLINIC | Age: 62
End: 2022-09-20
Payer: COMMERCIAL

## 2022-09-20 VITALS
HEIGHT: 67 IN | RESPIRATION RATE: 16 BRPM | BODY MASS INDEX: 32.36 KG/M2 | WEIGHT: 206.2 LBS | HEART RATE: 76 BPM | DIASTOLIC BLOOD PRESSURE: 72 MMHG | OXYGEN SATURATION: 98 % | SYSTOLIC BLOOD PRESSURE: 128 MMHG | TEMPERATURE: 98.4 F

## 2022-09-20 DIAGNOSIS — E78.2 MIXED HYPERLIPIDEMIA: ICD-10-CM

## 2022-09-20 DIAGNOSIS — E04.2 MULTINODULAR GOITER: ICD-10-CM

## 2022-09-20 DIAGNOSIS — N39.46 MIXED STRESS AND URGE INCONTINENCE: ICD-10-CM

## 2022-09-20 DIAGNOSIS — E11.9 DIABETES MELLITUS TYPE 2, NONINSULIN DEPENDENT (HCC): Primary | ICD-10-CM

## 2022-09-20 PROCEDURE — 99214 OFFICE O/P EST MOD 30 MIN: CPT | Performed by: FAMILY MEDICINE

## 2022-09-20 PROCEDURE — 3044F HG A1C LEVEL LT 7.0%: CPT | Performed by: FAMILY MEDICINE

## 2022-09-20 RX ORDER — DICLOFENAC SODIUM 20 MG/G
SOLUTION TOPICAL
COMMUNITY
Start: 2020-06-01

## 2022-09-20 NOTE — PROGRESS NOTES
Chief Complaint   Patient presents with    Diabetes     Fasting follow up    Cholesterol Problem    Medication Evaluation       HISTORY OF PRESENT ILLNESS   Fasting follow up Type 2 NIDDM, Hyperlipidemia, labs and medication check. Back in May we changed her Synjardy to plain Metformin d/t recurrent UTI's and yeast infections. She has continued on the lower dose of the Victoza 0.6 mg daily. The recurrent UTI's and yeast infections have gone away. She has h/o chronic urinary stress incontinence over the years and that issue is ongoing along w/ urinary urgency. She has been checking her BS's a few x a month and her sugars have been running a bit more on the higher end the past few months and her wt is back up some. BS's Fasting in the AM: 147, 123, 158, 138, 108, 154, 165  2 hrs after lunch: 111, 129  Diet: Noom Diet since 7-2021; has been eating more fruit over the summer  Exercise: since 6-2021 walks for exercise; currently up to walking daily x 30-45 minutes  Caffeine: 2-3 cups of half caff coffee a day, tea at lunch a few x a week, Diet Coke maybe once a month  Weight: happy her wt has been gradually coming down w/ eating healthier and increasing her exercise over the years; got down from the 240 lb range in 2019 to 198-200 range now and maintaining in that range (currently on the upper end)   REVIEW OF SYMPTOMS   Review of Systems   Constitutional: Negative. HENT: Negative. Eyes: Negative. Respiratory: Negative. Cardiovascular: Negative. Gastrointestinal:  Negative for nausea and vomiting. Genitourinary:  Negative for dysuria, frequency and hematuria. Musculoskeletal:  Negative for myalgias. Neurological: Negative. Endo/Heme/Allergies: Negative.           PROBLEM LIST/MEDICAL HISTORY     Problem List  Date Reviewed: 9/20/2022            Codes Class Noted    Multinodular goiter ICD-10-CM: E04.2  ICD-9-CM: 241.1  2/2/2022    Overview Addendum 9/20/2022  8:33 AM by Kathy Edwards MD [facility]     2021 Low TSH and MNG; Evaluation Endo Dr. Jerry Martins; Thyroid US, Right Nodule; Thyroid Uptake Scan, \"Hot nodule\"; no biopsy needed; Hot nodule is the cause of mild hyperthyroidism; PCP to monitor TSH and f/u Endo prn only or if TSH goes <0.10 or symptoms of hyperthyroidism             Infiltrating ductal carcinoma of left breast ([address] ICD-10-CM: C50.912  ICD-9-CM: 174.9  2/2/2022    Overview Addendum 4/27/2022  8:46 AM by Roxy Walker     02/02/22: LEFT breast bx. PATH: IDC, largest focus measures 10mm, low grade, ER+(99%)/NE+(45%)/HER2-, Ki-67 15%. Clinical stage 1. MammaPrint: Low-risk, luminal A-type, +0.208.    03/31/22: LEFT breast lumpectomy and SLNBx. PATH: IDC with lobular features, 17mm, grade 1, ER+(99%)/NE+(45%)/HER2-, Ki-67 15%, positive margins, 4 benign LN (0/4). LCIS present. Pathologic stage: pT1c, pN0.     04/14/22: LEFT breast lumpectomy re-excision of lateral margin. PATH: Benign breast tissue with extensive fat necrosis and focal usual duct hyperplasia. No evidence of carcinoma. Mixed hyperlipidemia ICD-10-CM: E78.2  ICD-9-CM: 272.2  3/26/2020        Hiatal hernia with GERD and esophagitis ICD-10-CM: K44.9, K21.00  ICD-9-CM: 553.3, 530.11  3/26/2020    Overview Signed 3/26/2020  8:48 AM by Adriane Deng MD     EGD 2016             Tendonitis, Achilles, left ICD-10-CM: M76.62  ICD-9-CM: 726.71  1/9/2019    Overview Signed 1/9/2019  8:33 AM by Adriane Deng MD     CSI Dr Dion Schuler              History of SCC (squamous cell carcinoma) of skin ICD-10-CM: Z29.454  ICD-9-CM: V10.83  1/9/2019    Overview Signed 1/9/2019  9:24 AM by Adriane Deng MD     Skin checks and cancer screenings per Isaiah.  Dr. Reza Lab             Heel spur, left ICD-10-CM: M77.32  ICD-9-CM: 726.73  9/19/2018    Overview Signed 9/19/2018  8:49 AM by Adriane Deng MD     2/2018, Dr Dion Schuler             Tendonitis, Achilles, right ICD-10-CM: M76.61  ICD-9-CM: 726.71  4/19/2018    Overview Signed 4/19/2018  9:24 AM by Jeb Sosa MD     2-2018: Dr Eda Loredo, bone spur chipped             Bone spurs of feet  ICD-10-CM: M77.50  ICD-9-CM: 726.91  2/24/2017    Overview Signed 2/24/2017 10:56 AM by Jeb Sosa MD     Podiatry Dr Stephon Stout             Diabetes mellitus type 2, noninsulin dependent ([facility] ICD-10-CM: E11.9  ICD-9-CM: 250.00  12/10/2015    Overview Addendum 12/10/2020 11:56 AM by Jeb Sosa MD     3/2001, Eye doctor Dr. Juan SANON             Vitamin D deficiency ICD-10-CM: E55.9  ICD-9-CM: 268.9  12/11/2013    Overview Signed 12/11/2013  1:27 PM by Jeb Sosa MD     12/2013               Postmenopause, LMP age ~ 45 yo, No HRT ICD-10-CM: Z78.0  ICD-9-CM: V49.81  12/5/2012        History of abnormal Pap smear, s/p Cryotherapy in her 29's ICD-10-CM: Z87.898  ICD-9-CM: V13.29  12/5/2012        Stress incontinence ICD-10-CM: N39.3  ICD-9-CM: Bubba Ortega  12/5/2012    Overview Signed 12/5/2012  9:55 AM by Jeb Sosa MD     Since child bearing years             Left sided sciatica ICD-10-CM: M54.32  ICD-9-CM: 724.3  12/5/2012    Overview Signed 12/5/2012 10:25 AM by Jeb Sosa MD     12/2012             S/p colonoscopy with polypectomy and diverticulosis 4/2010 ICD-10-CM: Z98.890  ICD-9-CM: V45.89  4/27/2010    Overview Signed 4/27/2010  9:16 AM by MD Dr Sky العراقي             Hiatal Hernia, Gastritis, mild; BX neg for H. Pylori 4/2010 ICD-10-CM: K29.70  ICD-9-CM: 535.50  4/27/2010    Overview Signed 4/27/2010  9:17 AM by MD Dr Sky العراقي             Right knee pain; damage at medial meniscus; 2010 ICD-10-CM: M25.561  ICD-9-CM: 719.46  4/27/2010    Overview Signed 4/27/2010  9:19 AM by MD Dr Niya العراقي; P.T.              Left foot pain 2009; s/p CSI ICD-10-CM: Q27.995  ICD-9-CM: 729.5  4/27/2010 Overview Signed 4/27/2010  9:20 AM by MD Dr Candy Kemp             Uterine fibroid ICD-10-CM: D25.9  ICD-9-CM: 218.9  Unknown        Meniere's disease ICD-10-CM: H81.09  ICD-9-CM: 386.00  Unknown    Overview Signed 5/24/2016 12:30 PM by Rachel Valerio MD     ENT ~ 2003, Dr Nagi Gramajo             FH: melanoma ICD-10-CM: Z80.8  ICD-9-CM: V16.8  Unknown    Overview Signed 1/9/2019  9:25 AM by Rachel Valerio MD     Skin cancer screenings, Derm Dr Rodolfo Goodrich             GERD (gastroesophageal reflux disease) ICD-10-CM: K21.9  ICD-9-CM: 530.81  Unknown               PAST SURGICAL HISTORY     Past Surgical History:   Procedure Laterality Date    HX BREAST BIOPSY Right 06/2014    Benign, Ramin Sor    HX BREAST LUMPECTOMY   1986    benign fibroadenoma right breast    HX BREAST LUMPECTOMY Left 3/31/2022    LEFT BREAST LUMPECTOMY WITH MAGSEED, LEFT BREAST SENTINEL NODE BIOPSY (PSEUDOCHOLINESTERASE DEFICIENCY) performed by Polina Pascual MD at [facility] AMBUL[facility]ORY [facility]    HX BREAST LUMPECTOMY Left 4/14/2022    RE EXCISION LATERAL MARGIN LEFT BREAST LUMPECTOMY performed by Polina Pascual MD at [address] [place] [address]    Emergency for fetal distress    HX COLONOSCOPY  ~2010    Dr Jose Contreras; Normal except Diverticulosis, but f/u 5 yrs d/t Fhx    HX COLONOSCOPY  07/11/2016    Polyps, Multiple Diverticulae, Internal Hemorrhoids; f/u 5 yrs, Dr Jose Contreras    HX COLONOSCOPY  11/30/2021    Diverticulosis, incomplete study due to poor prep, repeat 1 yr    HX DILATION AND CURETTAGE  10/8/14    AUB & Uterine Polyp (HYSTEROSCOPY)    HX ENDOSCOPY  07/11/2016    EGD: Hiatal Hernia, Grade 1 Esophagitis w/ reflux, Gastritis,  Dr Jose Contreras    HX ENDOSCOPY  11/30/2021    HX HEENT  1984; Dr. Devaughn Jose    Tonsillectomy; anesthesia reaction    HX LAP CHOLECYSTECTOMY  1998    gallstones    HX SKIN BIOPSY  ~2016    SCC excised from chest wall, Dr. Jeny Cornejo ADENOIDECTOMY  1981    [place] HYSTEROSCOPY,W/ENDO BX  5/2014    AUB; Negative         MEDICATIONS     Current Outpatient Medications   Medication Sig    diclofenac sodium 20 mg/gram /actuation(2 %) sopm     metFORMIN ER (GLUCOPHAGE XR) 500 mg tablet TAKE 2 TABLETS BY MOUTH DAILY (WITH DINNER). pen needle, diabetic (NovoTwist) 32 gauge x 1/5\" ndle USE ONCE DAILY AS DIRECTED    anastrozole (ARIMIDEX) 1 mg tablet Take 1 mg by mouth daily. Indications: hormone receptor positive breast cancer    rosuvastatin (CRESTOR) 10 mg tablet TAKE 1 TABLET BY MOUTH EVERY DAY AT NIGHT    TURMERIC PO Take  by mouth daily. MAGNESIUM GLYCINATE PO Take 250 mg by mouth nightly. Takes a few nights a week    acetaminophen/diphenhydramine (TYLENOL PM PO) Take  by mouth nightly as needed. meclizine (ANTIVERT) 25 mg tablet Take  by mouth three (3) times daily as needed for Dizziness. Lactobacillus acidophilus (PROBIOTIC ACIDOPHILUS PO) Take 1 Capsule by mouth daily. Victoza 3-Manolo 0.6 mg/0.1 mL (18 mg/3 mL) pnij INJECT 1.8 MG (0.3 ML) SUBCUTANEOUSLY DAILY (Patient taking differently: 0.6 mg. INJECT 0.6 MG (0.3 ML) SUBCUTANEOUSLY DAILY)    fluticasone propionate (FLONASE NA) by Nasal route. glucose blood VI test strips (Accu-Chek Nayeli Plus test strp) strip PER INSURANCE PREFERENCE, TEST 1X/D DX DIABETES E11.9    cholecalciferol (VITAMIN D3) (2,000 UNITS /50 MCG) cap capsule Take 2,000 Units by mouth daily. krill-om-3-dha-epa-phospho-ast 1,000-230-60 mg cap Take 1 Cap by mouth daily. No current facility-administered medications for this visit.           ALLERGIES     Allergies   Allergen Reactions    Jardiance [Empagliflozin] Other (comments)     Recurrent UTI's and yeast infections    Succinylcholine Other (comments)     Prolonged period of recovery following anesthesia    Glipizide Other (comments)     Frequent hypoglycemia    Other Medication Diarrhea     Intolerant of >1000 mg of Metformin    Phentermine Other (comments) Dizziness and \"crashes\"          SOCIAL HISTORY     Social History     Tobacco Use    Smoking status: Former     Packs/day: 0.00     Years: 10.00     Pack years: 0.00     Types: Cigarettes     Quit date: 1991     Years since quittin.4    Smokeless tobacco: Never   Substance Use Topics    Alcohol use: Yes     Alcohol/week: 14.0 standard drinks     Types: 10 Glasses of wine, 4 Standard drinks or equivalent per week     Comment: 1-2 glasses of wine with dinner 5 days a week     Social History     Social History Narrative        Lives in the [place] [address] with . Has 2 sons and 1 daughter. Works for [employer] in [employer] [facility] [employer]. Likes to garden and music, theater and reading.       Initial PPV23 - age 59; will need PCV 13 at age 69 yo and PPSV 21 again at 74 yo        Diet: Noom Diet since ; has been eating more fruit over the summer    Exercise: since  walks for exercise; currently up to walking daily x 30-45 minutes    Caffeine: 2-3 cups of half caff coffee a day, tea at lunch a few x a week, Diet Coke maybe once a month    Weight: happy her wt has been gradually coming down w/ eating healthier and increasing her exercise over the years; got down from the 240 lb range in 2019 to 198-200 range now and maintaining in that range (currently on the upper end)                 Social History     Substance and Sexual Activity   Sexual Activity Yes    Partners: Male    Birth control/protection: None       IMMUNIZATIONS     Immunization History   Administered Date(s) Administered    COVID-19, MODERNA BLUE border, Primary or Immunocompromised, (age 18y+), IM, 100 mcg/0.5mL 10/30/2021, 2022    COVID-19, MODERNA Booster BLUE border, (age 18y+), IM, 50mcg/0.25mL 10/30/2021    COVID-19, PFIZER PURPLE top, DILUTE for use, (age 15 y+), IM, 30mcg/0.3mL 2021, 2021    Influenza Vaccine 10/16/2014, 10/22/2015, 2017, 2018, 10/24/2019, 09/24/2021    Influenza Vaccine Split 09/01/2009, 11/01/2012    Influenza, AFLURIA (age 11-32 mo), IM, MDV, 0.25 mL, Fluzone (age 10 mo+), AFLURIA (age 1 y+), IM, MDV, 0.5mL 10/01/2018    Influenza, FLUARIX, FLULAVAL, FLUZONE (age 10 mo+) AND AFLURIA, (age 1 y+), PF, 0.5mL 10/24/2019, 09/04/2020    Influenza, FLUCELVAX, (age 10 mo+), MDCK, PF 10/16/2018    Pneumococcal Polysaccharide (PPSV-23) 04/19/2018    TD Vaccine 06/05/2012    Zoster Recombinant 06/12/2019, 10/17/2019         FAMILY HISTORY     Family History   Problem Relation Age of Onset    COPD Mother         smoker    Colon Polyps Mother 72    Osteoporosis Mother     Lung Disease Mother         COPD    Delayed Awakening Mother         No dx; possible    Anesth Problems Mother         DELAYED AWAKENING    Stroke Father         Mini strokes    Dementia Father         Alzheimer's    Heart Disease Father         PVD    Alcohol abuse Father     Thyroid Disease Sister         hypothyroidism    SKIN CANCER Sister         melanoma    Diabetes Maternal Grandmother     Lung Cancer Maternal Grandfather     Cancer Maternal Grandfather         Lung    Hypertension Brother         living in his 66's    Cancer Sister         Skin -melanoma, sq, bas. Cancer Sister         stage 0 melanoma    Diabetes Maternal Uncle          VITALS   Visit Vitals  /72 (BP 1 Location: Left upper arm, BP Patient Position: Sitting, BP Cuff Size: Large adult)   Pulse 76   Temp 98.4 °F (36.9 °C) (Oral)   Resp 16   Ht 5' 7\" (1.702 m)   Wt 206 lb 3.2 oz (93.5 kg)   SpO2 98%   BMI 32.30 kg/m²          PHYSICAL EXAMINATION   Physical Exam  Vitals reviewed. Constitutional:       General: She is not in acute distress. Appearance: Normal appearance. Neck:      Thyroid: No thyroid tenderness. Vascular: No carotid bruit. Cardiovascular:      Rate and Rhythm: Normal rate and regular rhythm. Heart sounds: Normal heart sounds.    Pulmonary:      Effort: Pulmonary effort is normal.      Breath sounds: Normal breath sounds. Abdominal:      General: There is no distension. Palpations: Abdomen is soft. There is no mass. Tenderness: no abdominal tenderness   Musculoskeletal:         General: No swelling or tenderness. Cervical back: No tenderness. Right lower leg: No edema. Left lower leg: No edema. Skin:     General: Skin is warm and dry. Neurological:      Mental Status: She is alert. ASSESSMENT & PLAN   Diagnoses and all orders for this visit:    1. Diabetes mellitus type 2, noninsulin dependent (HCC)  -     METABOLIC PANEL, COMPREHENSIVE; Future  -     HEMOGLOBIN A1C WITH EAG; Future    2. Mixed hyperlipidemia, maintained on statin therapy  Continue Crestor 10 mg daily for now  -     LIPID PANEL; Future    3. Multinodular goiter  Assessment & Plan:  2021 Low TSH and MNG; Evaluation Endo Dr. Jewel White; Thyroid US, Right Nodule; Thyroid Uptake Scan, \"Hot nodule\"; no biopsy needed; Hot nodule is the cause of mild hyperthyroidism; PCP to monitor TSH and f/u Endo prn only or if TSH goes <0.10 or symptoms of hyperthyroidism    Orders:  -     TSH 3RD GENERATION; Future    4. Mixed stress and urge incontinence  -     REFERRAL TO UROLOGY    Fasting labs checked today  Specific lipid/LDL/HgbA1c goals assessed  Reviewed diet, nutrition, exercise, weight management, BMI/goals. Age/risk based screening recommendations, health maintenance & prevention counseling. Cancer screening USPTFS guidelines reviewed w/ pt today. Discussed benefits/positive/negative outcomes of screening based on age/risk stratification. Informed consent for/against screening based on pt's personal hx/risk factors. Updated in history above and health maintenance. Reviewed medications and side effects   Back in May we changed her Synjardy to plain Metformin d/t recurrent UTI's and yeast infections.   She has continued on the lower dose of the Victoza 0.6 mg daily  Her weight and home BS's have been creeping up since that time  Discussed possibly titrating the Victoza back up if her HgbA1c is rising and/or not at goal  Further follow up & other recommendations pending review of labs.

## 2022-09-20 NOTE — ASSESSMENT & PLAN NOTE
2021 Low TSH and MNG; Evaluation Endo Dr. Stanford Anthony; Thyroid US, Right Nodule; Thyroid Uptake Scan, \"Hot nodule\"; no biopsy needed;  Hot nodule is the cause of mild hyperthyroidism; PCP to monitor TSH and f/u Endo prn only or if TSH goes <0.10 or symptoms of hyperthyroidism

## 2022-09-20 NOTE — PROGRESS NOTES
Chief Complaint   Patient presents with    Cholesterol Problem     fasting    Diabetes     1. \"Have you been to the ER, urgent care clinic since your last visit? Hospitalized since your last visit? \" No    2. \"Have you seen or consulted any other health care providers outside of the [address] [place] [address] since your last visit? \" Oncologist Dr Nava Ramirez, Dr Yung Díaz    3. For patients aged 39-70: Has the patient had a colonoscopy / FIT/ Cologuard? Yes - no Care Gap present 11/2021 Diverticulosis, incomplete study due to poor prep, repeat 1 yr      If the patient is female:    4. For patients aged 41-77: Has the patient had a mammogram within the past 2 years? Yes - no Care Gap present      5. For patients aged 21-65: Has the patient had a pap smear?  Gyn Dr Zenaida Essex in Feb

## 2022-09-22 LAB
ALBUMIN SERPL-MCNC: 4 G/DL (ref 3.5–5)
ALBUMIN/GLOB SERPL: 1.3 {RATIO} (ref 1.1–2.2)
ALP SERPL-CCNC: 68 U/L (ref 45–117)
ALT SERPL-CCNC: 24 U/L (ref 12–78)
ANION GAP SERPL CALC-SCNC: 6 MMOL/L (ref 5–15)
AST SERPL-CCNC: 18 U/L (ref 15–37)
BILIRUB SERPL-MCNC: 0.5 MG/DL (ref 0.2–1)
BUN SERPL-MCNC: 14 MG/DL (ref 6–20)
BUN/CREAT SERPL: 17 (ref 12–20)
CALCIUM SERPL-MCNC: 9 MG/DL (ref 8.5–10.1)
CHLORIDE SERPL-SCNC: 106 MMOL/L (ref 97–108)
CHOLEST SERPL-MCNC: 156 MG/DL
CO2 SERPL-SCNC: 28 MMOL/L (ref 21–32)
CREAT SERPL-MCNC: 0.84 MG/DL (ref 0.55–1.02)
EST. AVERAGE GLUCOSE BLD GHB EST-MCNC: 137 MG/DL
GLOBULIN SER CALC-MCNC: 3.1 G/DL (ref 2–4)
GLUCOSE SERPL-MCNC: 118 MG/DL (ref 65–100)
HBA1C MFR BLD: 6.4 % (ref 4–5.6)
HDLC SERPL-MCNC: 59 MG/DL
HDLC SERPL: 2.6 {RATIO} (ref 0–5)
LDLC SERPL CALC-MCNC: 68.6 MG/DL (ref 0–100)
POTASSIUM SERPL-SCNC: 4.5 MMOL/L (ref 3.5–5.1)
PROT SERPL-MCNC: 7.1 G/DL (ref 6.4–8.2)
SODIUM SERPL-SCNC: 140 MMOL/L (ref 136–145)
TRIGL SERPL-MCNC: 142 MG/DL (ref ?–150)
TSH SERPL DL<=0.05 MIU/L-ACNC: 0.21 UIU/ML (ref 0.36–3.74)
VLDLC SERPL CALC-MCNC: 28.4 MG/DL

## 2022-09-26 DIAGNOSIS — E78.2 MIXED HYPERLIPIDEMIA: ICD-10-CM

## 2022-09-26 RX ORDER — ROSUVASTATIN CALCIUM 10 MG/1
TABLET, COATED ORAL
Qty: 90 TABLET | Refills: 3 | Status: SHIPPED | OUTPATIENT
Start: 2022-09-26

## 2022-10-09 DIAGNOSIS — E11.9 DIABETES MELLITUS TYPE 2, NONINSULIN DEPENDENT (HCC): ICD-10-CM

## 2022-10-09 RX ORDER — METFORMIN HYDROCHLORIDE 500 MG/1
1000 TABLET, EXTENDED RELEASE ORAL
Qty: 180 TABLET | Refills: 0 | Status: SHIPPED | OUTPATIENT
Start: 2022-10-09

## 2022-10-13 RX ORDER — BLOOD SUGAR DIAGNOSTIC
STRIP MISCELLANEOUS
Qty: 30 PEN NEEDLE | Refills: 0 | Status: SHIPPED | OUTPATIENT
Start: 2022-10-13

## 2022-10-13 NOTE — TELEPHONE ENCOUNTER
MD Younger,    Just received Fax from [facility] stating the new rx (10/10/22) Novofine 32 g needles also are on backorder. They suggested BD UF pen needle as alternative. 6mm 32g. Contacted patient again to see if wants to try that and she said she is willing to try them since she cannot get the others. Only wants 30 d/s since trying these. (Also stated she is going out of town at the end of the month so wants to go ahead with order so that she has something. ). Thanks, Delores    Requested Prescriptions     Pending Prescriptions Disp Refills    Insulin Needles, Disposable, (BD Ultra-Fine Micro Pen Needle) 32 gauge x 1/4\" ndle 30 Pen Needle 0     Sig: Use once daily as directed. For [address] [facility] in place:   Recommendation Provided To:    Intervention Detail: New Rx: 1, reason: Patient Preference  Gap Closed?:   Intervention Accepted By:   Time Spent (min): 15

## 2022-10-18 NOTE — PROGRESS NOTES
HISTORY OF PRESENT ILLNESS  Emilie Jiménez is a 57 y.o. female. HPI Established patient for follow-up of LEFT breast cancer. Denies breast mass, skin changes, nipple discharge and pain. Breast history -  Referring - Rebekah Wasserman MD  22: LEFT breast bx. PATH: IDC, largest focus measures 10mm, low grade, ER+(99%)/AR+(45%)/HER2-, Ki-67 15%. Clinical stage 1. MammaPrint: Low-risk, luminal A-type, +0.208.  22: LEFT breast lumpectomy and SLNBx. - Dr. Sharene Soulier: IDC with lobular features, 17mm, grade 1, ER+(99%)/AR+(45%)/HER2-, Ki-67 15%, positive margins, 4 benign LN (0/4). LCIS present. Pathologic stage: pT1c, pN0.   22: LEFT breast lumpectomy re-excision of lateral margin. PATH: Benign breast tissue with extensive fat necrosis and focal usual duct hyperplasia. No evidence of carcinoma.   2022 - completed XR Dr. Day Munoz  2022 - started on AI - Dr. Jacklyn Pineda     Family history -   No family history of breast or ovarian cancer      OB History          3    Para   3    Term                AB        Living             SAB        IAB        Ectopic        Molar        Multiple        Live Births              Obstetric Comments   Menarche 6, LMP , # of children 1, age of 4st delivery 27, Hysterectomy/oophorectomy No/No, Breast bx Yes, history of breast feeding No, BCP Yes, Hormone therapy No    Emergency  x 1; vaginal deliveries x 2; Previous Gyn: Karen Colbert at [facility] new Gyn Dr Cale Gambino (retired), changing to new Gyn at [address] [place] [address] 2019; Dr. Alice Brennan               Past Surgical History:   Procedure Laterality Date    HX BREAST BIOPSY Right 2014    Benign, Ramond Victoria    HX BREAST LUMPECTOMY   1986    benign fibroadenoma right breast    HX BREAST LUMPECTOMY Left 3/31/2022    LEFT BREAST LUMPECTOMY WITH MAGSEED, LEFT BREAST SENTINEL NODE BIOPSY (PSEUDOCHOLINESTERASE DEFICIENCY) performed by Dawood Hernandez MD at [facility] AMBULATORY OR    HX BREAST LUMPECTOMY Left 4/14/2022    RE EXCISION LATERAL MARGIN LEFT BREAST LUMPECTOMY performed by Gaby Perez MD at [facility]    Emergency for fetal distress    HX COLONOSCOPY  ~2010    Dr Willy Parikh; Normal except Diverticulosis, but f/u 5 yrs d/t Fhx    HX COLONOSCOPY  07/11/2016    Polyps, Multiple Diverticulae, Internal Hemorrhoids; f/u 5 yrs, Dr Wlily Parikh    HX COLONOSCOPY  11/30/2021    Diverticulosis, incomplete study due to poor prep, repeat 1 yr    HX DILATION AND CURETTAGE  10/8/14    AUB & Uterine Polyp (HYSTEROSCOPY)    HX ENDOSCOPY  07/11/2016    EGD: Hiatal Hernia, Grade 1 Esophagitis w/ reflux, Gastritis,  Dr Willy Parikh    HX ENDOSCOPY  11/30/2021    HX HEENT  1984; Dr. Sunita Brooks    Tonsillectomy; anesthesia reaction    HX LAP CHOLECYSTECTOMY  1998    gallstones    HX SKIN BIOPSY  ~2016    SCC excised from chest wall, Dr. Marimar Blue HYSTEROSCOPY,W/ENDO BX  5/2014    AUB; Negative     ROS    Physical Exam  Constitutional:       Appearance: Normal appearance. Chest:   Breasts:     Right: No mass, nipple discharge, skin change or tenderness. Left: No mass, nipple discharge, skin change or tenderness. Comments: Mild post XRT skin changes  Musculoskeletal:      Comments: FROM - UE x 2   Lymphadenopathy:      Upper Body:      Right upper body: No supraclavicular or axillary adenopathy. Left upper body: No supraclavicular or axillary adenopathy. Neurological:      Mental Status: She is alert.    Psychiatric:         Attention and Perception: Attention normal.         Mood and Affect: Mood normal.         Speech: Speech normal.         Behavior: Behavior normal.       Visit Vitals  BP (!) 131/59 (BP 1 Location: Left upper arm, BP Patient Position: Sitting, BP Cuff Size: Small adult)   Pulse 67   Temp 98.2 °F (36.8 °C) (Oral)   Resp 18   Ht 5' 7\" (1.702 m)   Wt 212 lb (96.2 kg)   BMI 33.20 kg/m²       ASSESSMENT and PLAN    ICD-10-CM ICD-9-CM    1. Infiltrating ductal carcinoma of left breast (HCC)  C50.912 174.9       2. S/P lumpectomy of breast  Z98.890 V45.89       3. S/P radiation therapy  Z92.3 V66.1       4. History of breast cancer in female  Z85.3 V10.3             Normal exam with no evidence of local recurrence. Reviewed normal post treatment change. Taking AI and tolerating well overall. Continue care with Dr. Nora Live. Continues care with Dr. Morgan Merida. Mammogram scheduled for 2/2023. RTC here in 8/2023 or sooner PRN. She is comfortable with this plan. All questions answered and she stated understanding. Total time spent for this patient - 20 minutes.

## 2022-10-20 ENCOUNTER — OFFICE VISIT (OUTPATIENT)
Dept: SURGERY | Age: 62
End: 2022-10-20
Payer: COMMERCIAL

## 2022-10-20 VITALS
DIASTOLIC BLOOD PRESSURE: 59 MMHG | RESPIRATION RATE: 18 BRPM | BODY MASS INDEX: 33.27 KG/M2 | TEMPERATURE: 98.2 F | SYSTOLIC BLOOD PRESSURE: 131 MMHG | WEIGHT: 212 LBS | HEIGHT: 67 IN | HEART RATE: 67 BPM

## 2022-10-20 DIAGNOSIS — Z92.3 S/P RADIATION THERAPY: ICD-10-CM

## 2022-10-20 DIAGNOSIS — Z85.3 HISTORY OF BREAST CANCER IN FEMALE: ICD-10-CM

## 2022-10-20 DIAGNOSIS — C50.912 INFILTRATING DUCTAL CARCINOMA OF LEFT BREAST (HCC): Primary | ICD-10-CM

## 2022-10-20 DIAGNOSIS — Z98.890 S/P LUMPECTOMY OF BREAST: ICD-10-CM

## 2022-10-20 PROCEDURE — 99213 OFFICE O/P EST LOW 20 MIN: CPT | Performed by: NURSE PRACTITIONER

## 2022-11-27 DIAGNOSIS — E11.9 DIABETES MELLITUS TYPE 2, NONINSULIN DEPENDENT (HCC): ICD-10-CM

## 2022-11-27 RX ORDER — LIRAGLUTIDE 6 MG/ML
0.6 INJECTION SUBCUTANEOUS DAILY
Qty: 3 ML | Refills: 2 | Status: SHIPPED | OUTPATIENT
Start: 2022-11-27

## 2022-11-27 NOTE — TELEPHONE ENCOUNTER
Sent in for the 0.6 mg daily dose as patient wanted to stay on lower dose until her upcoming follow up appt on 1-5-23.

## 2022-12-29 RX ORDER — BLOOD SUGAR DIAGNOSTIC
STRIP MISCELLANEOUS
Qty: 30 PEN NEEDLE | Refills: 5 | Status: SHIPPED | OUTPATIENT
Start: 2022-12-29

## 2023-02-08 ENCOUNTER — OFFICE VISIT (OUTPATIENT)
Dept: FAMILY MEDICINE CLINIC | Age: 63
End: 2023-02-08
Payer: COMMERCIAL

## 2023-02-08 VITALS
SYSTOLIC BLOOD PRESSURE: 132 MMHG | DIASTOLIC BLOOD PRESSURE: 78 MMHG | OXYGEN SATURATION: 97 % | WEIGHT: 212.6 LBS | TEMPERATURE: 98.6 F | BODY MASS INDEX: 33.37 KG/M2 | HEART RATE: 76 BPM | HEIGHT: 67 IN | RESPIRATION RATE: 16 BRPM

## 2023-02-08 DIAGNOSIS — E11.9 DIABETES MELLITUS TYPE 2, NONINSULIN DEPENDENT (HCC): Primary | ICD-10-CM

## 2023-02-08 DIAGNOSIS — Z79.899 HIGH RISK MEDICATION USE: ICD-10-CM

## 2023-02-08 DIAGNOSIS — E55.9 VITAMIN D DEFICIENCY: ICD-10-CM

## 2023-02-08 DIAGNOSIS — C50.912 INFILTRATING DUCTAL CARCINOMA OF LEFT BREAST (HCC): ICD-10-CM

## 2023-02-08 DIAGNOSIS — E78.2 MIXED HYPERLIPIDEMIA: ICD-10-CM

## 2023-02-08 DIAGNOSIS — E11.9 DIABETES MELLITUS TYPE 2, NONINSULIN DEPENDENT (HCC): ICD-10-CM

## 2023-02-08 PROCEDURE — 99214 OFFICE O/P EST MOD 30 MIN: CPT | Performed by: FAMILY MEDICINE

## 2023-02-08 RX ORDER — METFORMIN HYDROCHLORIDE 500 MG/1
1000 TABLET, EXTENDED RELEASE ORAL
Qty: 180 TABLET | Refills: 0 | Status: SHIPPED | OUTPATIENT
Start: 2023-02-08

## 2023-02-08 RX ORDER — CHOLECALCIFEROL (VITAMIN D3) 125 MCG
5 CAPSULE ORAL
COMMUNITY

## 2023-02-08 NOTE — ASSESSMENT & PLAN NOTE
Followed by specialist and maintained on Arimidex, currently month 6. Mammogram scheduled in the next month. No acute findings meriting change in the plan.

## 2023-02-08 NOTE — PROGRESS NOTES
Chief Complaint   Patient presents with    Diabetes     Fasting follow up    Cholesterol Problem     1. \"Have you been to the ER, urgent care clinic since your last visit? Hospitalized since your last visit? \" No    2. \"Have you seen or consulted any other health care providers outside of the [address] [place] [address] since your last visit? \" Dr Vanessa Muhammad NP    3. For patients aged 39-70: Has the patient had a colonoscopy / FIT/ Cologuard? Yes - no Care Gap present 11/2021 Diverticulosis, incomplete study due to poor prep, repeat 1 yr scheduled for 3/3/203      If the patient is female:    4. For patients aged 41-77: Has the patient had a mammogram within the past 2 years? Yes - no Care Gap present 2/2022 scheduled for later this week      5. For patients aged 21-65: Has the patient had a pap smear?  Yes - no Care Gap present gyn Dr Marcia Main @ [address]

## 2023-02-08 NOTE — PROGRESS NOTES
Chief Complaint   Patient presents with    Diabetes     Fasting follow up    Cholesterol Problem       HISTORY OF PRESENT ILLNESS   Fasting follow up Type 2 NIDDM, Hypercholesterolemia, labs and medication check. Has been only checking BS's a few x a month lately. Fasting in the AM runs 114-143, got a max reading of 160 back in December  Diet: Noom Diet since 7-2021; but not as strict lately, has strayed from diet some, eating more comfort foods/increased portions lately and more carbs lately  Exercise: walks almost daily (at least 5 x a week) x 30-45 minutes but getting more difficult to keep up w/ the past few months due to her chronic right hip pain and she plans on following up w/ ortho  Caffeine: 2-3 cups of half caff coffee a day, tea at lunch a few x a week, Diet Coke maybe once a month  Weight: happy her wt had been gradually coming down w/ eating healthier and increasing her exercise over the years; got down from the 240 lb range in 2019 to 198-200 range now and maintaining in that range (currently on the upper end)   REVIEW OF SYMPTOMS   Review of Systems   Constitutional: Negative. Eyes: Negative. Respiratory: Negative. Cardiovascular: Negative. Gastrointestinal:  Negative for abdominal pain, diarrhea, nausea and vomiting. Genitourinary: Negative. Musculoskeletal:  Negative for myalgias. Neurological: Negative. Endo/Heme/Allergies: Negative. PROBLEM LIST/MEDICAL HISTORY     Problem List  Date Reviewed: 2/8/2023            Codes Class Noted    Multinodular goiter ICD-10-CM: E04.2  ICD-9-CM: 241.1  2/2/2022    Overview Addendum 9/20/2022  8:33 AM by Radha Gaona MD     2021 Low TSH and MNG; Evaluation Endo Dr. Karry Hamman; Thyroid US, Right Nodule; Thyroid Uptake Scan, \"Hot nodule\"; no biopsy needed;  Hot nodule is the cause of mild hyperthyroidism; PCP to monitor TSH and f/u Endo prn only or if TSH goes <0.10 or symptoms of hyperthyroidism Infiltrating ductal carcinoma of left breast ([facility] [address] ICD-10-CM: C50.912  ICD-9-CM: 174.9  2/2/2022    Overview Addendum 4/27/2022  8:46 AM by Lynda Marroquin     02/02/22: LEFT breast bx. PATH: IDC, largest focus measures 10mm, low grade, ER+(99%)/MO+(45%)/HER2-, Ki-67 15%. Clinical stage 1. MammaPrint: Low-risk, luminal A-type, +0.208.    03/31/22: LEFT breast lumpectomy and SLNBx. PATH: IDC with lobular features, 17mm, grade 1, ER+(99%)/MO+(45%)/HER2-, Ki-67 15%, positive margins, 4 benign LN (0/4). LCIS present. Pathologic stage: pT1c, pN0.     04/14/22: LEFT breast lumpectomy re-excision of lateral margin. PATH: Benign breast tissue with extensive fat necrosis and focal usual duct hyperplasia. No evidence of carcinoma. Mixed hyperlipidemia ICD-10-CM: E78.2  ICD-9-CM: 272.2  3/26/2020        Hiatal hernia with GERD and esophagitis ICD-10-CM: K44.9, K21.00  ICD-9-CM: 553.3, 530.11  3/26/2020    Overview Signed 3/26/2020  8:48 AM by Dayana Hoffman MD     EGD 2016             Tendonitis, Achilles, left ICD-10-CM: M76.62  ICD-9-CM: 726.71  1/9/2019    Overview Signed 1/9/2019  8:33 AM by Dayana Hoffman MD     CSI Dr Callum Godinez              History of SCC (squamous cell carcinoma) of skin ICD-10-CM: O98.003  ICD-9-CM: V10.83  1/9/2019    Overview Signed 1/9/2019  9:24 AM by Dayana Hoffman MD     Skin checks and cancer screenings per Isaiah.  Dr. Phill Desai             Heel spur, left ICD-10-CM: M77.32  ICD-9-CM: 726.73  9/19/2018    Overview Signed 9/19/2018  8:49 AM by Dayana Hoffman MD     2/2018, Dr Callum Godinez             Tendonitis, Achilles, right ICD-10-CM: M76.61  ICD-9-CM: 726.71  4/19/2018    Overview Signed 4/19/2018  9:24 AM by Dayana Hoffman MD     2-2018: Dr Callum Godinez, bone spur chipped             Bone spurs of feet  ICD-10-CM: M77.50  ICD-9-CM: 726.91  2/24/2017    Overview Signed 2/24/2017 10:56 AM by Dayana Hoffman MD     Podiatry  Pedro Luis             Diabetes mellitus type 2, noninsulin dependent ([facility] [address] ICD-10-CM: E11.9  ICD-9-CM: 250.00  12/10/2015    Overview Addendum 2/8/2023 12:53 PM by Antolin Lockett MD     3/2001, Eye doctor Dr. Joss SANON; Podiatry Dr. Heraclio Guerrero             Vitamin D deficiency ICD-10-CM: E55.9  ICD-9-CM: 268.9  12/11/2013    Overview Signed 12/11/2013  1:27 PM by Antolin Lockett MD     12/2013               Postmenopause, LMP age ~ 43 yo, No HRT ICD-10-CM: Z78.0  ICD-9-CM: V49.81  12/5/2012        History of abnormal Pap smear, s/p Cryotherapy in her 27's ICD-10-CM: Z87.898  ICD-9-CM: V13.29  12/5/2012        Stress incontinence ICD-10-CM: N39.3  ICD-9-CM: Jackquline Hodgkin  12/5/2012    Overview Signed 12/5/2012  9:55 AM by Antolin Lockett MD     Since child bearing years             Left sided sciatica ICD-10-CM: M54.32  ICD-9-CM: 724.3  12/5/2012    Overview Signed 12/5/2012 10:25 AM by Antolin Lockett MD     12/2012             S/p colonoscopy with polypectomy and diverticulosis 4/2010 ICD-10-CM: Z98.890  ICD-9-CM: V45.89  4/27/2010    Overview Signed 4/27/2010  9:16 AM by MD Dr Shabnam Price             Hiatal Hernia, Gastritis, mild; BX neg for H. Pylori 4/2010 ICD-10-CM: K29.70  ICD-9-CM: 535.50  4/27/2010    Overview Signed 4/27/2010  9:17 AM by MD Dr Shabnam Price             Right knee pain; damage at medial meniscus; 2010 ICD-10-CM: M25.561  ICD-9-CM: 719.46  4/27/2010    Overview Signed 4/27/2010  9:19 AM by MD Dr Sweetie Price; P.T.              Left foot pain 2009; s/p CSI ICD-10-CM: C92.373  ICD-9-CM: 729.5  4/27/2010    Overview Signed 4/27/2010  9:20 AM by MD Dr Travon Price Staff             Uterine fibroid ICD-10-CM: D25.9  ICD-9-CM: 218.9  Unknown        Meniere's disease ICD-10-CM: H81.09  ICD-9-CM: 386.00  Unknown    Overview Signed 5/24/2016 12:30 PM by Antolin Lockett MD ENT ~ 2003, Dr Josiah Dewitt             FH: melanoma ICD-10-CM: Z80.8  ICD-9-CM: V16.8  Unknown    Overview Signed 1/9/2019  9:25 AM by Reggie Xie MD     Skin cancer screenings, Derm Dr Anna Brink             GERD (gastroesophageal reflux disease) ICD-10-CM: K21.9  ICD-9-CM: 530.81  Unknown               PAST SURGICAL HISTORY     Past Surgical History:   Procedure Laterality Date    HX BREAST BIOPSY Right 06/2014    Benign, Edel Akanksha    HX BREAST LUMPECTOMY   1986    benign fibroadenoma right breast    HX BREAST LUMPECTOMY Left 3/31/2022    LEFT BREAST LUMPECTOMY WITH MAGSEED, LEFT BREAST SENTINEL NODE BIOPSY (PSEUDOCHOLINESTERASE DEFICIENCY) performed by Emilio lCayton MD at [facility] AMBULATORY OR    HX BREAST LUMPECTOMY Left 4/14/2022    RE EXCISION LATERAL MARGIN LEFT BREAST LUMPECTOMY performed by Emilio Clayton MD at [address] [place] [address]    Emergency for fetal distress    HX COLONOSCOPY  ~2010    Dr German He; Normal except Diverticulosis, but f/u 5 yrs d/t Fhx    HX COLONOSCOPY  07/11/2016    Polyps, Multiple Diverticulae, Internal Hemorrhoids; f/u 5 yrs, Dr German He    HX COLONOSCOPY  11/30/2021    Diverticulosis, incomplete study due to poor prep, repeat 1 yr    HX DILATION AND CURETTAGE  10/8/14    AUB & Uterine Polyp (HYSTEROSCOPY)    HX ENDOSCOPY  07/11/2016    EGD: Hiatal Hernia, Grade 1 Esophagitis w/ reflux, Gastritis,  Dr German He    HX ENDOSCOPY  11/30/2021    HX HEENT  1984; Dr. Jose Guadalupe Vasquezumb    Tonsillectomy; anesthesia reaction    HX LAP CHOLECYSTECTOMY  1998    gallstones    HX SKIN BIOPSY  ~2016    SCC excised from chest wall, Dr. Madhavi Napier HYSTEROSCOPY BX ENDOMETRIUM&/POLYPC W/WO D&C  5/2014    AUB; Negative         MEDICATIONS     Current Outpatient Medications   Medication Sig    metFORMIN ER (GLUCOPHAGE XR) 500 mg tablet TAKE 2 TABLETS BY MOUTH DAILY (WITH DINNER).     melatonin 5 mg tablet Take 5 mg by mouth nightly. Insulin Needles, Disposable, (BD Ultra-Fine Micro Pen Needle) 32 gauge x 1/4\" ndle USE AS DIRECTED DAILY    liraglutide (Victoza 3-Manolo) 0.6 mg/0.1 mL (18 mg/3 mL) pnij 0.6 mg by SubCUTAneous route daily. INJECT 0.6 MG (0.3 ML) SUBCUTANEOUSLY DAILY    pen needle, diabetic (NovoFine Plus) 32 gauge x 1/6\" ndle Use once daily as directed. rosuvastatin (CRESTOR) 10 mg tablet TAKE 1 TABLET BY MOUTH EVERY DAY AT NIGHT    diclofenac sodium 20 mg/gram /actuation(2 %) sopm daily as needed. pen needle, diabetic (NovoTwist) 32 gauge x 1/5\" ndle USE ONCE DAILY AS DIRECTED    anastrozole (ARIMIDEX) 1 mg tablet Take 1 mg by mouth daily. Indications: hormone receptor positive breast cancer    TURMERIC PO Take  by mouth daily. acetaminophen/diphenhydramine (TYLENOL PM PO) Take  by mouth nightly as needed. Lactobacillus acidophilus (PROBIOTIC ACIDOPHILUS PO) Take 1 Capsule by mouth daily. fluticasone propionate (FLONASE NA) by Nasal route. glucose blood VI test strips (Accu-Chek Nayeli Plus test strp) strip PER INSURANCE PREFERENCE, TEST 1X/D DX DIABETES E11.9    meclizine (ANTIVERT) 25 mg tablet Take  by mouth three (3) times daily as needed for Dizziness. (Patient not taking: Reported on 2/8/2023)    cholecalciferol (VITAMIN D3) (2,000 UNITS /50 MCG) cap capsule Take 2,000 Units by mouth daily. (Patient not taking: No sig reported)    krill-om-3-dha-epa-phospho-ast 1,000-230-60 mg cap Take 1 Cap by mouth daily. (Patient not taking: No sig reported)     No current facility-administered medications for this visit.           ALLERGIES     Allergies   Allergen Reactions    Jardiance [Empagliflozin] Other (comments)     Recurrent UTI's and yeast infections    Succinylcholine Other (comments)     Prolonged period of recovery following anesthesia    Glipizide Other (comments)     Frequent hypoglycemia    Other Medication Diarrhea     Intolerant of >1000 mg of Metformin    Phentermine Other (comments) Dizziness and \"crashes\"          SOCIAL HISTORY     Social History     Tobacco Use    Smoking status: Former     Packs/day: 0.00     Years: 10.00     Pack years: 0.00     Types: Cigarettes     Quit date: 1991     Years since quittin.8    Smokeless tobacco: Never   Substance Use Topics    Alcohol use: Yes     Alcohol/week: 14.0 standard drinks     Types: 10 Glasses of wine, 4 Standard drinks or equivalent per week     Comment: 1-2 glasses of wine with dinner 5 days a week     Social History     Social History Narrative        Lives in the [place] [address] with . Has 2 sons and 1 daughter. Works for [employer] [place] [employer] [place] Likes to garden and music, theater and reading.       Initial PPV23 - age 59; will need PCV 15 at age 71 yo and PPSV 21 again at 74 yo        Diet: Noom Diet since ; but not as strict lately, has strayed from diet some, eating more comfort foods/increased portions lately and more carbs lately    Exercise: walks almost daily (at least 5 x a week) x 30-45 minutes but getting more difficult to keep up w/ the past few months due to her right hip pain and she plans on following up w/ ortho     Caffeine: 2-3 cups of half caff coffee a day, tea at lunch a few x a week, Diet Coke maybe once a month    Weight: happy her wt had been gradually coming down w/ eating healthier and increasing her exercise over the years; got down from the 240 lb range in 2019 to 198-200 range now and maintaining in that range (currently on the upper end)                 Social History     Substance and Sexual Activity   Sexual Activity Yes    Partners: Male    Birth control/protection: None       IMMUNIZATIONS     Immunization History   Administered Date(s) Administered    COVID-19, MODERNA BLUE border, Primary or Immunocompromised, (age 18y+), IM, 100 mcg/0.5mL 2022, 2022    COVID-19, MODERNA Booster BLUE border, (age 18y+), IM, 50mcg/0.25mL 10/30/2021    COVID-19, PFIZER PURPLE top, DILUTE for use, (age 15 y+), IM, 30mcg/0.3mL 03/17/2021, 04/09/2021    Influenza Vaccine 10/16/2014, 10/22/2015, 11/01/2017, 11/01/2018, 10/24/2019, 10/07/2022    Influenza Vaccine Split 09/01/2009, 11/01/2012    Influenza, AFLURIA (age 10-32 mo), IM, MDV, 0.25 mL, Fluzone (age 10 mo+), AFLURIA (age 1 y+), IM, MDV, 0.5mL 10/01/2018    Influenza, FLUARIX, FLULAVAL, FLUZONE (age 10 mo+) AND AFLURIA, (age 1 y+), PF, 0.5mL 10/24/2019, 09/04/2020    Influenza, FLUCELVAX, (age 10 mo+), MDCK, PF 10/16/2018, 10/07/2022    Pneumococcal Polysaccharide (PPSV-23) 04/19/2018    TD Vaccine 06/05/2012    Zoster Recombinant 06/12/2019, 10/17/2019         FAMILY HISTORY     Family History   Problem Relation Age of Onset    COPD Mother         smoker    Colon Polyps Mother 72    Osteoporosis Mother     Lung Disease Mother         COPD    Delayed Awakening Mother         No dx; possible    Anesth Problems Mother         DELAYED AWAKENING    Stroke Father         Mini strokes    Dementia Father         Alzheimer's    Heart Disease Father         PVD    Alcohol abuse Father     Thyroid Disease Sister         hypothyroidism    SKIN CANCER Sister         melanoma    Diabetes Maternal Grandmother     Lung Cancer Maternal Grandfather     Cancer Maternal Grandfather         Lung    Hypertension Brother         living in his 66's    Cancer Sister         Skin -melanoma, sq, bas. Cancer Sister         stage 0 melanoma    Diabetes Maternal Uncle          VITALS   Visit Vitals  /78 (BP 1 Location: Left lower arm, BP Patient Position: Sitting, BP Cuff Size: Large adult)   Pulse 76   Temp 98.6 °F (37 °C) (Oral)   Resp 16   Ht 5' 7\" (1.702 m)   Wt 212 lb 9.6 oz (96.4 kg)   SpO2 97%   BMI 33.30 kg/m²          PHYSICAL EXAMINATION   Physical Exam  Vitals reviewed. Constitutional:       General: She is not in acute distress. Neck:      Vascular: No carotid bruit.    Cardiovascular: Rate and Rhythm: Normal rate and regular rhythm. Heart sounds: Normal heart sounds. Pulmonary:      Effort: Pulmonary effort is normal.      Breath sounds: Normal breath sounds. Abdominal:      Palpations: Abdomen is soft. Tenderness: There is no abdominal tenderness. Musculoskeletal:         General: No swelling. Right lower leg: No edema. Left lower leg: No edema. Lymphadenopathy:      Cervical: No cervical adenopathy. Neurological:      Gait: Gait normal.              LABORATORY DATA/ANCILLARY/IMAGING     Results for orders placed or performed in visit on 09/20/22   TSH 3RD GENERATION   Result Value Ref Range    TSH 0.21 (L) 0.36 - 3.74 uIU/mL   HEMOGLOBIN A1C WITH EAG   Result Value Ref Range    Hemoglobin A1c 6.4 (H) 4.0 - 5.6 %    Est. average glucose 049 mg/dL   METABOLIC PANEL, COMPREHENSIVE   Result Value Ref Range    Sodium 140 136 - 145 mmol/L    Potassium 4.5 3.5 - 5.1 mmol/L    Chloride 106 97 - 108 mmol/L    CO2 28 21 - 32 mmol/L    Anion gap 6 5 - 15 mmol/L    Glucose 118 (H) 65 - 100 mg/dL    BUN 14 6 - 20 MG/DL    Creatinine 0.84 0.55 - 1.02 MG/DL    BUN/Creatinine ratio 17 12 - 20      GFR est AA >60 >60 ml/min/1.73m2    GFR est non-AA >60 >60 ml/min/1.73m2    Calcium 9.0 8.5 - 10.1 MG/DL    Bilirubin, total 0.5 0.2 - 1.0 MG/DL    ALT (SGPT) 24 12 - 78 U/L    AST (SGOT) 18 15 - 37 U/L    Alk. phosphatase 68 45 - 117 U/L    Protein, total 7.1 6.4 - 8.2 g/dL    Albumin 4.0 3.5 - 5.0 g/dL    Globulin 3.1 2.0 - 4.0 g/dL    A-G Ratio 1.3 1.1 - 2.2     LIPID PANEL   Result Value Ref Range    Cholesterol, total 156 <200 MG/DL    Triglyceride 142 <150 MG/DL    HDL Cholesterol 59 MG/DL    LDL, calculated 68.6 0 - 100 MG/DL    VLDL, calculated 28.4 MG/DL    CHOL/HDL Ratio 2.6 0.0 - 5.0               ASSESSMENT & PLAN   Diagnoses and all orders for this visit:    1.  Diabetes mellitus type 2, noninsulin dependent ([address]  Less compliant w/ diet the past few months  Continues on regimen of Victoza & Metformin  No changes for now as she gets back on track w/ diet and hopefully rebuilds exercise after evaluation w/ ortho about her chronic right hip pain  Further recommendations pending review of today's labs  -     METABOLIC PANEL, COMPREHENSIVE; Future  -     LIPID PANEL; Future  -     HEMOGLOBIN A1C WITH EAG; Future  -     MICROALBUMIN, UR, RAND W/ MICROALB/CREAT RATIO; Future    2. Mixed hyperlipidemia  Improved on Crestor, cont 10 mg daily for now  Self dc'd fish oil a few months ago  -     LIPID PANEL; Future    3. BMI 33.0-33.9,adult    4. Vitamin D deficiency  She self dc'd her OTC Vitamin D3 supplement about 3-4 months ago after reading that too much Vitamin D can cause problems  -     VITAMIN D, 25 HYDROXY; Future    5. High risk medication use  -     CBC W/O DIFF; Future  -     METABOLIC PANEL, COMPREHENSIVE; Future    6. Infiltrating ductal carcinoma of left breast ([facility] [address]  Assessment & Plan:  Followed by specialist and maintained on Arimidex, currently month 6. Mammogram scheduled in the next month. No acute findings meriting change in the plan. Fasting labs checked today  Reviewed diet, nutrition, exercise, weight management, specific lipid/LDL/Hgba1c goals assessed  Age/risk based screening recommendations, health maintenance & prevention counseling. Cancer screening USPTFS guidelines reviewed w/ pt today. Discussed benefits/positive/negative outcomes of screening based on age/risk stratification. Informed consent for/against screening based on pt's personal hx/risk factors. Updated in history above and health maintenance. Reviewed medications and side effects   Further follow up & other recommendations pending review of labs.  If all remains good and stable, follow up in 6 months, sooner prn

## 2023-02-09 LAB
25(OH)D3 SERPL-MCNC: 28.6 NG/ML (ref 30–100)
ALBUMIN SERPL-MCNC: 4.3 G/DL (ref 3.5–5)
ALBUMIN/GLOB SERPL: 1.3 (ref 1.1–2.2)
ALP SERPL-CCNC: 76 U/L (ref 45–117)
ALT SERPL-CCNC: 23 U/L (ref 12–78)
ANION GAP SERPL CALC-SCNC: 5 MMOL/L (ref 5–15)
AST SERPL-CCNC: 15 U/L (ref 15–37)
BILIRUB SERPL-MCNC: 0.6 MG/DL (ref 0.2–1)
BUN SERPL-MCNC: 13 MG/DL (ref 6–20)
BUN/CREAT SERPL: 16 (ref 12–20)
CALCIUM SERPL-MCNC: 9.3 MG/DL (ref 8.5–10.1)
CHLORIDE SERPL-SCNC: 107 MMOL/L (ref 97–108)
CHOLEST SERPL-MCNC: 148 MG/DL
CO2 SERPL-SCNC: 27 MMOL/L (ref 21–32)
CREAT SERPL-MCNC: 0.81 MG/DL (ref 0.55–1.02)
CREAT UR-MCNC: 26.8 MG/DL
ERYTHROCYTE [DISTWIDTH] IN BLOOD BY AUTOMATED COUNT: 12.5 % (ref 11.5–14.5)
EST. AVERAGE GLUCOSE BLD GHB EST-MCNC: 134 MG/DL
GLOBULIN SER CALC-MCNC: 3.2 G/DL (ref 2–4)
GLUCOSE SERPL-MCNC: 128 MG/DL (ref 65–100)
HBA1C MFR BLD: 6.3 % (ref 4–5.6)
HCT VFR BLD AUTO: 45.4 % (ref 35–47)
HDLC SERPL-MCNC: 64 MG/DL
HDLC SERPL: 2.3 (ref 0–5)
HGB BLD-MCNC: 14.3 G/DL (ref 11.5–16)
LDLC SERPL CALC-MCNC: 52.2 MG/DL (ref 0–100)
MCH RBC QN AUTO: 29.7 PG (ref 26–34)
MCHC RBC AUTO-ENTMCNC: 31.5 G/DL (ref 30–36.5)
MCV RBC AUTO: 94.4 FL (ref 80–99)
MICROALBUMIN UR-MCNC: <0.5 MG/DL
MICROALBUMIN/CREAT UR-RTO: <19 MG/G (ref 0–30)
NRBC # BLD: 0 K/UL (ref 0–0.01)
NRBC BLD-RTO: 0 PER 100 WBC
PLATELET # BLD AUTO: 236 K/UL (ref 150–400)
PMV BLD AUTO: 10.4 FL (ref 8.9–12.9)
POTASSIUM SERPL-SCNC: 4.2 MMOL/L (ref 3.5–5.1)
PROT SERPL-MCNC: 7.5 G/DL (ref 6.4–8.2)
RBC # BLD AUTO: 4.81 M/UL (ref 3.8–5.2)
SODIUM SERPL-SCNC: 139 MMOL/L (ref 136–145)
TRIGL SERPL-MCNC: 159 MG/DL (ref ?–150)
VLDLC SERPL CALC-MCNC: 31.8 MG/DL
WBC # BLD AUTO: 7.3 K/UL (ref 3.6–11)

## 2023-02-10 ENCOUNTER — HOSPITAL ENCOUNTER (OUTPATIENT)
Dept: MAMMOGRAPHY | Age: 63
Discharge: HOME | End: 2023-02-10
Attending: INTERNAL MEDICINE
Payer: COMMERCIAL

## 2023-02-10 DIAGNOSIS — Z79.811 AROMATASE INHIBITOR USE: ICD-10-CM

## 2023-02-10 DIAGNOSIS — E11.9 DIABETES MELLITUS TYPE 2, NONINSULIN DEPENDENT (HCC): ICD-10-CM

## 2023-02-10 DIAGNOSIS — Z98.890 HISTORY OF LUMPECTOMY OF LEFT BREAST: ICD-10-CM

## 2023-02-10 DIAGNOSIS — C50.912 INFILTRATING DUCTAL CARCINOMA OF LEFT BREAST (HCC): ICD-10-CM

## 2023-02-10 PROCEDURE — 77062 BREAST TOMOSYNTHESIS BI: CPT

## 2023-02-16 ENCOUNTER — OFFICE VISIT (OUTPATIENT)
Dept: ONCOLOGY | Age: 63
End: 2023-02-16
Payer: COMMERCIAL

## 2023-02-16 VITALS
RESPIRATION RATE: 18 BRPM | HEART RATE: 78 BPM | TEMPERATURE: 97.8 F | SYSTOLIC BLOOD PRESSURE: 130 MMHG | BODY MASS INDEX: 33.59 KG/M2 | OXYGEN SATURATION: 97 % | WEIGHT: 214 LBS | HEIGHT: 67 IN | DIASTOLIC BLOOD PRESSURE: 86 MMHG

## 2023-02-16 DIAGNOSIS — R92.2 DENSE BREAST TISSUE ON MAMMOGRAM: ICD-10-CM

## 2023-02-16 DIAGNOSIS — C50.912 INFILTRATING DUCTAL CARCINOMA OF LEFT BREAST (HCC): Primary | ICD-10-CM

## 2023-02-16 DIAGNOSIS — Z79.811 AROMATASE INHIBITOR USE: ICD-10-CM

## 2023-02-16 DIAGNOSIS — Z98.890 HISTORY OF LUMPECTOMY OF LEFT BREAST: ICD-10-CM

## 2023-02-16 PROCEDURE — 99213 OFFICE O/P EST LOW 20 MIN: CPT | Performed by: INTERNAL MEDICINE

## 2023-02-16 NOTE — LETTER
2/16/2023    Patient: Trish Woods   YOB: 1960   Date of Visit: 2/16/2023     Lennox Ramirez MD  [address]    Dear Lennox Ramirez MD,      Thank you for referring Ms. Vega Barron to [address] [place] [address] for evaluation. My notes for this consultation are attached. If you have questions, please do not hesitate to call me. I look forward to following your patient along with you.       Sincerely,    Cortes Koch, DO

## 2023-02-16 NOTE — PROGRESS NOTES
Cancer [place] at [place] [address] Shandra Chen, [address], [place]: [phone]  F: [phone]    Reason for Visit:   Zan Brittle is a 57 y.o. female who is seen for follow up of breast cancer. Treatment History:   Breast biopsy 2/22  Lumpectomy 4/22  Radiation done 6/15/22  Started AI 7/4/22. STAGE: T1cN0 ER+ [id number]    History of Present Illness:     Pt seen today for 6 mo fu breast cancer on adjuvant AI. Doing well and having some joint pains/ arthralgia. Pt walks daily. Has hip and knee pain. Seeing ortho. No fevers/ chills/ chest pain/ SOB/ nausea/ vomiting/diarrhea/ pain/fatigue          Last visit:  Finished XRT 6/22. Started AI and tolerating fine so far. No big issues. Noticed some little things like knee problems not new. Walks a lot. Has occ hot flashes. No fevers/ chills/ chest pain/ SOB/ nausea/ vomiting/diarrhea/ pain/fatigue        Past Medical History:   Diagnosis Date    Bone spur of ankle 2018    Bone spurs of feet  02/24/2017    Podiatry Dr Wallis Patient    Breast cancer [facility]) 2022    Left    Diabetes mellitus type 2, noninsulin dependent ([facility] [address] 12/10/2015    3/2001    Diverticulosis age 26    FH: melanoma     also FH: BCC    Gestational diabetes 1998    Heel spur, left 09/19/2018 2/2018, Dr Jayson Ireland    Hemorrhoids     Hiatal hernia with GERD and esophagitis 03/26/2020    EGD 2016    Hiatal Hernia, Gastritis, mild; BX neg for H. Pylori 4/2010 04/27/2010    History of abnormal Pap smear, s/p Cryotherapy in her 30's 12/05/2012    History of SCC (squamous cell carcinoma) of skin 01/09/2019    Skin checks and cancer screenings per [facility].  Dr. Demond Quinn    Left foot pain 2009; s/p CSI 04/27/2010    Left sided sciatica 12/05/2012    Meniere's disease     ENT ~ 2003, Dr Raphael Espinosa    Mixed hyperlipidemia 03/26/2020    Multinodular goiter 02/02/2022 2021 Endo Dr. Nikko Langley, LMP age ~ 45 yo, No HRT 12/05/2012 Radiation therapy complication 3551    Right knee pain; damage at medial meniscus; 2010 04/27/2010    S/p colonoscopy with polypectomy 4/2010 04/27/2010    Stress fracture foot     left    Stress incontinence 12/05/2012    Tendonitis, Achilles, left 01/09/2019    CSI Dr Jesenia Gonzalez     Tendonitis, Achilles, right 04/19/2018 2-2018: Dr Jesenia Gonzalez, bone spur chipped    Uterine fibroid     Vitamin D deficiency 12/11/2013 12/2013       Past Surgical History:   Procedure Laterality Date    HX BREAST BIOPSY Right 06/2014    Benign, Barnie Score    HX BREAST LUMPECTOMY   1986    benign fibroadenoma right breast    HX BREAST LUMPECTOMY Left 3/31/2022    LEFT BREAST LUMPECTOMY WITH MAGSEED, LEFT BREAST SENTINEL NODE BIOPSY (PSEUDOCHOLINESTERASE DEFICIENCY) performed by Ray Zamora MD at [place] [address]    HX BREAST LUMPECTOMY Left 4/14/2022    RE EXCISION LATERAL MARGIN LEFT BREAST LUMPECTOMY performed by Ray Zamora MD at [address] [place] [address]    Emergency for fetal distress    HX COLONOSCOPY  ~2010    Dr Maria Antonia Daley; Normal except Diverticulosis, but f/u 5 yrs d/t Fhx    HX COLONOSCOPY  07/11/2016    Polyps, Multiple Diverticulae, Internal Hemorrhoids; f/u 5 yrs, Dr Maria Antonia Daley    HX COLONOSCOPY  11/30/2021    Diverticulosis, incomplete study due to poor prep, repeat 1 yr    HX DILATION AND CURETTAGE  10/8/14    AUB & Uterine Polyp (HYSTEROSCOPY)    HX ENDOSCOPY  07/11/2016    EGD: Hiatal Hernia, Grade 1 Esophagitis w/ reflux, Gastritis,  Dr Maria Antonia Daley    HX ENDOSCOPY  11/30/2021    HX HEENT  1984; Dr. Lela Morel    Tonsillectomy; anesthesia reaction    HX LAP CHOLECYSTECTOMY  1998    gallstones    HX SKIN BIOPSY  ~2016    SCC excised from chest wall, Dr. uSnita Spencer HYSTEROSCOPY BX ENDOMETRIUM&/POLYPC W/WO D&C  5/2014    AUB;  Negative      Social History     Tobacco Use    Smoking status: Former     Packs/day: 0.00     Years: 10.00 Pack years: 0.00     Types: Cigarettes     Quit date: 1991     Years since quittin.8    Smokeless tobacco: Never   Substance Use Topics    Alcohol use: Yes     Alcohol/week: 14.0 standard drinks     Types: 10 Glasses of wine, 4 Standard drinks or equivalent per week     Comment: 1-2 glasses of wine with dinner 5 days a week      Family History   Problem Relation Age of Onset    COPD Mother         smoker    Colon Polyps Mother 72    Osteoporosis Mother     Lung Disease Mother         COPD    Delayed Awakening Mother         No dx; possible    Anesth Problems Mother         DELAYED AWAKENING    Stroke Father         Mini strokes    Dementia Father         Alzheimer's    Heart Disease Father         PVD    Alcohol abuse Father     Thyroid Disease Sister         hypothyroidism    SKIN CANCER Sister         melanoma    Diabetes Maternal Grandmother     Lung Cancer Maternal Grandfather     Cancer Maternal Grandfather         Lung    Hypertension Brother         living in his 66's    Cancer Sister         Skin -melanoma, sq, bas. Cancer Sister         stage 0 melanoma    Diabetes Maternal Uncle      Current Outpatient Medications   Medication Sig    metFORMIN ER (GLUCOPHAGE XR) 500 mg tablet TAKE 2 TABLETS BY MOUTH DAILY (WITH DINNER). melatonin 5 mg tablet Take 5 mg by mouth nightly. Insulin Needles, Disposable, (BD Ultra-Fine Micro Pen Needle) 32 gauge x 1/4\" ndle USE AS DIRECTED DAILY    liraglutide (Victoza 3-Manolo) 0.6 mg/0.1 mL (18 mg/3 mL) pnij 0.6 mg by SubCUTAneous route daily. INJECT 0.6 MG (0.3 ML) SUBCUTANEOUSLY DAILY    pen needle, diabetic (NovoFine Plus) 32 gauge x 1/6\" ndle Use once daily as directed. rosuvastatin (CRESTOR) 10 mg tablet TAKE 1 TABLET BY MOUTH EVERY DAY AT NIGHT    diclofenac sodium 20 mg/gram /actuation(2 %) sopm daily as needed.     pen needle, diabetic (NovoTwist) 32 gauge x 1/5\" ndle USE ONCE DAILY AS DIRECTED    anastrozole (ARIMIDEX) 1 mg tablet Take 1 mg by mouth daily. Indications: hormone receptor positive breast cancer    TURMERIC PO Take  by mouth daily. acetaminophen/diphenhydramine (TYLENOL PM PO) Take  by mouth nightly as needed. meclizine (ANTIVERT) 25 mg tablet Take  by mouth three (3) times daily as needed for Dizziness. Lactobacillus acidophilus (PROBIOTIC ACIDOPHILUS PO) Take 1 Capsule by mouth daily. fluticasone propionate (FLONASE NA) by Nasal route. glucose blood VI test strips (Accu-Chek Nayeli Plus test strp) strip PER INSURANCE PREFERENCE, TEST 1X/D DX DIABETES E11.9    cholecalciferol (VITAMIN D3) (2,000 UNITS /50 MCG) cap capsule Take 2,000 Units by mouth daily. krill-om-3-dha-epa-phospho-ast 1,000-230-60 mg cap Take 1 Capsule by mouth daily. No current facility-administered medications for this visit. Allergies   Allergen Reactions    Jardiance [Empagliflozin] Other (comments)     Recurrent UTI's and yeast infections    Succinylcholine Other (comments)     Prolonged period of recovery following anesthesia    Glipizide Other (comments)     Frequent hypoglycemia    Other Medication Diarrhea     Intolerant of >1000 mg of Metformin    Phentermine Other (comments)     Dizziness and \"crashes\"        A complete review of systems was obtained, negative except as described above and as reported on ROS sheet scanned into system.      Physical Exam:     Visit Vitals  /86 (BP 1 Location: Left arm, BP Patient Position: Sitting, BP Cuff Size: Adult)   Pulse 78   Temp 97.8 °F (36.6 °C) (Temporal)   Resp 18   Ht 5' 7\" (1.702 m)   Wt 214 lb (97.1 kg)   SpO2 97%   BMI 33.52 kg/m²       General: alert, cooperative, no distress   Mental  status: normal mood, behavior, speech, dress, motor activity, and thought processes, able to follow commands   HENT: NCAT   Neck: no visualized mass   Resp: no respiratory distress, lungs clear  CV regular  GI soft NT   Neuro: no gross deficits   Skin: no discoloration or lesions of concern on visible areas Psychiatric: normal affect, consistent with stated mood, no evidence of hallucinations     Breast no masses palpable on LEFT, RIGHT with cyst at 6 o clock      Results:     Lab Results   Component Value Date/Time    WBC 7.3 02/08/2023 12:34 PM    HGB 14.3 02/08/2023 12:34 PM    HCT 45.4 02/08/2023 12:34 PM    PLATELET 288 86/60/1019 12:34 PM    MCV 94.4 02/08/2023 12:34 PM    ABS. NEUTROPHILS 4.3 03/24/2022 11:23 AM     Lab Results   Component Value Date/Time    Sodium 139 02/08/2023 12:34 PM    Potassium 4.2 02/08/2023 12:34 PM    Chloride 107 02/08/2023 12:34 PM    CO2 27 02/08/2023 12:34 PM    Glucose 128 (H) 02/08/2023 12:34 PM    BUN 13 02/08/2023 12:34 PM    Creatinine 0.81 02/08/2023 12:34 PM    GFR est AA >60 09/20/2022 08:31 AM    GFR est non-AA >60 09/20/2022 08:31 AM    Calcium 9.3 02/08/2023 12:34 PM    Glucose (POC) 106 04/14/2022 03:21 PM     Lab Results   Component Value Date/Time    Bilirubin, total 0.6 02/08/2023 12:34 PM    ALT (SGPT) 23 02/08/2023 12:34 PM    Alk. phosphatase 76 02/08/2023 12:34 PM    Protein, total 7.5 02/08/2023 12:34 PM    Albumin 4.3 02/08/2023 12:34 PM    Globulin 3.2 02/08/2023 12:34 PM     MRI Results (most recent):  Results from Hospital Encounter encounter on 02/22/22    MRI BREAST BI W WO CONT    Narrative  HISTORY: 69-year-old female with newly diagnosed left breast cancer, presenting  for preoperative breast MRI. COMPARISON: Mammography from February and January 2022, as well as December 2021. No prior MRI. TECHNIQUE:  Bilateral breast MRI was performed using a dedicated breast coil without  compression with the patient in the prone position. Precontrast T1-weighted  images with fat suppression were obtained followed by bolus injection of 9 mL  Gadavist. Postcontrast dynamic and high-resolution images were acquired. T2-weighted axial imaging with fat suppression was also performed.  The images  were analyzed using CAD analysis, enhancement curves, digital subtraction, and 2  and 3 dimensional reconstructions. FINDINGS:    Background parenchymal enhancement: Mild    Mammographic breast density: Scattered fibroglandular breast tissue    Right breast:  There is no suspicious mass or mass enhancement within the right breast. There  is no skin thickening or nipple retraction. There is no suspicious axillary or internal mammary chain lymphadenopathy. Left breast:  Within the middle third of the left breast upper inner quadrant, there is  evidence of a biopsy clip, and associated biopsy tract, and ill-defined  malignant enhancement measuring up to 1 x 1.2 x 1.2 cm. There is mixed internal  kinetics. This is consistent with newly diagnosed breast carcinoma. There is no  other suspicious enhancement within the left breast. There is no evidence of  multifocal or multicentric disease. There is no skin thickening or nipple  retraction. There is no suspicious axillary or internal mammary chain lymphadenopathy. Impression  Right Breast:  1. BI-RADS Assessment Category 1: Negative. No evidence of breast carcinoma  within the right breast.    Left Breast:  1. BI-RADS Assessment Category 6: Known biopsy proven malignancy- Appropriate  action should be taken. 1.2 cm of malignant masslike enhancement and associated  biopsy clip, located in the middle third of the left breast upper inner  quadrant, consistent with newly diagnosed breast carcinoma. 2. No evidence of multicentric or multifocal disease. 3. No suspicious lymphadenopathy. RECOMMENDATIONS:  The newly diagnosed left breast malignancy appears amenable to breast  conservation therapy. There is no evidence of multifocal, multicentric, or  contralateral disease. A summary portfolio has been created in PACS. Records reviewed and summarized above. Pathology report(s) reviewed above. Radiology report(s) reviewed above.       Assessment:/PLAN     1)  Stage 1 T1cN0 LEFT breast cancer ER+ Her2 negative mammaprint low risk post lumpectomy/ re excision 4/22. mammaprint low risk. No chemo needed  done radiation 6/22. Seen today for 6 mo fu. Doing well overall.walking daily. On adjuvant hormonal therapy with AI and tolerating with arthralgia. Continue AI with monitoring. Mammo good 2/23. Has dense breast tissue and will order MRI for possible. Labs and routine HM. Pt is healthy overall. 2) DM/ thyroid/ high cholesterol/ inner ear disorder. Per PCP/ endocrine. 3) Psychosocial. Mood good. Coping well. Works as  for [employer]. Walking daily. Has support. Fu here in 6 months, seeing Rad/onc in 3 month  Call if questions       I appreciate the opportunity to participate in MsBinh Demetrius Murphy Monynoah's care.     Signed By: Brandon Lowry DO

## 2023-02-16 NOTE — PROGRESS NOTES
Verified Name and  of the patient. Chief Complaint   Patient presents with    Follow-up     6 month follow-up infiltrating ductal carcinoma of left breast        Health maintenance will be addressed with Primary Care Provider at next visit. Vitals:    23 0834   BP: 130/86   Pulse: 78   Resp: 18   Temp: 97.8 °F (36.6 °C)   TempSrc: Temporal   SpO2: 97%   Weight: 214 lb (97.1 kg)   Height: 5' 7\" (1.702 m)   PainSc:   4   PainLoc: Hip       1. Have you been to the ER, urgent care clinic since your last visit? Hospitalized since your last visit? No    2. Have you seen or consulted any other health care providers outside of the [address] [place] [address] since your last visit? Include any pap smears or colon screening.  No

## 2023-02-17 ENCOUNTER — NURSE NAVIGATOR (OUTPATIENT)
Dept: CASE MANAGEMENT | Age: 63
End: 2023-02-17

## 2023-02-20 NOTE — PROGRESS NOTES
[employer]  Breast Navigator Encounter    Name:  Surinder Gutierrez      Age:  57 y.o. Diagnosis:     LEFT breast cancer      Interdisciplinary Team:  Med-Onc:                 Dr. Yosef Gustafson         Surg-Onc:                Dr. Ambreen Jennings:         Plastics:           :     Nurse Navigator:  Joanne Mccrary RN, BSN, [facility]    Encounter type:  [x]Patient Initiated  []Navigator Follow-up []Pre-op  []Post-op  []Check-in Prior to First Treatment []Treatment Modality Change  []Initial Navigator Encounter []Other:       Narrative:    Returned patient's call. Saw Dr. Yosef Gustafson recently who ordered a breast MRI. Called to schedule this, and she was told that she needed to go back to [facility] since her MRI was there last year. Prefers to go to [facility]. After checking with NIR Saldaña, Navigator in Imaging, I called patient to let her know that she can have this done at the imaging center of her choice. She will keep the appt on 4/14 at [facility]. She had a lot of dizziness last time after her MRI, so scheduled this earlier in the morning this time in case she takes a little while afterwards to recover. She felt very stressed. I discussed the use of Xanax with her, taken about 30 mins before her MRI. Continues on her anastrozole with an increase in some hip pain. Dr. Yosef Gustafson has discussed possibly switching to another AI, and she will consider this if it does not improve. Asked about how often to get an MRI. I asked her to discuss the MRI/mammo schedule with Dayton Valdes NP when she sees her next in November. I told her some people alternate MRI/mammo every six months or do both at the same time. She was very appreciative of the return call. Overall she is doing well.           Joanne Mccrary RN, BSN, [facility]  Oncology Breast Navigator     [employer]  [address] [place] [address]  W: [phone]  F: [phone]  [email or web address]  Good Help to Those in Need®

## 2023-04-14 ENCOUNTER — HOSPITAL ENCOUNTER (OUTPATIENT)
Dept: MRI IMAGING | Age: 63
Discharge: HOME | End: 2023-04-14
Attending: INTERNAL MEDICINE
Payer: COMMERCIAL

## 2023-04-14 DIAGNOSIS — Z98.890 HISTORY OF LUMPECTOMY OF LEFT BREAST: ICD-10-CM

## 2023-04-14 DIAGNOSIS — Z79.811 AROMATASE INHIBITOR USE: ICD-10-CM

## 2023-04-14 DIAGNOSIS — C50.912 INFILTRATING DUCTAL CARCINOMA OF LEFT BREAST (HCC): ICD-10-CM

## 2023-04-14 DIAGNOSIS — R92.2 DENSE BREAST TISSUE ON MAMMOGRAM: ICD-10-CM

## 2023-04-14 PROCEDURE — 74011000250 HC RX REV CODE- 250: Performed by: INTERNAL MEDICINE

## 2023-04-14 PROCEDURE — A9576 INJ PROHANCE MULTIPACK: HCPCS

## 2023-04-14 PROCEDURE — 74011000258 HC RX REV CODE- 258: Performed by: INTERNAL MEDICINE

## 2023-04-14 PROCEDURE — 77049 MRI BREAST C-+ W/CAD BI: CPT

## 2023-04-14 PROCEDURE — 74011250636 HC RX REV CODE- 250/636

## 2023-04-14 RX ORDER — SODIUM CHLORIDE 0.9 % (FLUSH) 0.9 %
10 SYRINGE (ML) INJECTION
Status: COMPLETED | OUTPATIENT
Start: 2023-04-14 | End: 2023-04-14

## 2023-04-14 RX ADMIN — SODIUM CHLORIDE, PRESERVATIVE FREE 10 ML: 5 INJECTION INTRAVENOUS at 09:43

## 2023-04-14 RX ADMIN — GADOTERIDOL 20 ML: 279.3 INJECTION, SOLUTION INTRAVENOUS at 09:42

## 2023-04-14 RX ADMIN — SODIUM CHLORIDE 100 ML: 9 INJECTION, SOLUTION INTRAVENOUS at 09:43

## 2023-04-14 NOTE — PROGRESS NOTES
Call to patient, left VM on self identifying phone line with Provider message re recent MRI Breast.  Encouraged callback to RN if questions.

## 2023-05-14 DIAGNOSIS — E11.9 TYPE 2 DIABETES MELLITUS WITHOUT COMPLICATIONS (HCC): ICD-10-CM

## 2023-05-15 ENCOUNTER — HOSPITAL ENCOUNTER (OUTPATIENT)
Facility: HOSPITAL | Age: 63
Discharge: HOME | End: 2023-05-18

## 2023-05-15 RX ORDER — METFORMIN HYDROCHLORIDE 500 MG/1
TABLET, EXTENDED RELEASE ORAL
Qty: 180 TABLET | Refills: 1 | Status: SHIPPED | OUTPATIENT
Start: 2023-05-15

## 2023-07-09 DIAGNOSIS — E78.2 MIXED HYPERLIPIDEMIA: ICD-10-CM

## 2023-07-10 RX ORDER — ROSUVASTATIN CALCIUM 10 MG/1
TABLET, COATED ORAL
Qty: 90 TABLET | Refills: 1 | Status: SHIPPED | OUTPATIENT
Start: 2023-07-10

## 2023-07-10 RX ORDER — ANASTROZOLE 1 MG/1
TABLET ORAL
Qty: 90 TABLET | Refills: 1 | Status: SHIPPED | OUTPATIENT
Start: 2023-07-10

## 2023-07-10 NOTE — TELEPHONE ENCOUNTER
Chief Complaint   Patient presents with    Medication Refill     Last Office visit 02/17/2023  Next follow Up 08/17/2023

## 2023-08-15 NOTE — PROGRESS NOTES
Cancer [place] at [address], [address]  W: [phone]  F: [phone]    Reason for Visit:   Crystal Diego is a 57 y.o. female who is seen for fu breast cancer    Treatment History:    Breast biopsy 2/22   Lumpectomy 4/22   Radiation done 6/15/22   Started AI 7/4/22. STAGE: 1 T1cN0 ER+ [id number]       History of Present Illness:     Pt seen today for office 6 mo fu breast cancer on adjuvant AI. Doing well and having some joint pains/ arthralgia. Has list of questions today   No fevers/ chills/ chest pain/ SOB/ nausea/ vomiting/diarrhea/ pain/fatigue   . Past Medical History:   Diagnosis Date    Bone spur of ankle 2018    Bone spur of foot 02/24/2017    Podiatry Dr Marina Mims    Breast cancer [facility]) 2022    Left    Diabetes mellitus type 2, noninsulin dependent [facility]) 12/10/2015    3/2001    Diverticulosis age 26    FH: melanoma     also FH: BCC    Gastritis 04/27/2010    Gestational diabetes 1998    Heel spur, left 09/19/2018 2/2018, Dr Coby Vivar    Hemorrhoids     Hiatal hernia with GERD and esophagitis 03/26/2020    EGD 2016    History of abnormal Pap smear 12/05/2012    History of SCC (squamous cell carcinoma) of skin 01/09/2019    Skin checks and cancer screenings per [facility].  Dr. Kit Messina    Left foot pain 04/27/2010    Left sided sciatica 12/05/2012    Meniere's disease     ENT     Mixed hyperlipidemia 03/26/2020    Multinodular goiter 02/02/2022 2021 Endo Dr. Billy Oneill 12/05/2012    Radiation therapy complication 7059    Right knee pain 04/27/2010    S/P colonoscopy with polypectomy 04/27/2010    Stress incontinence 12/05/2012    Tendonitis, Achilles, left 01/09/2019    CSI Dr Coby Vivar     Tendonitis, Achilles, right 04/19/2018 2-2018: Dr Coby Vivar, bone spur chipped    Uterine fibroid     Vitamin D deficiency 12/11/2013 12/2013       Past Surgical History:   Procedure Laterality

## 2023-08-16 ENCOUNTER — OFFICE VISIT (OUTPATIENT)
Age: 63
End: 2023-08-16
Payer: COMMERCIAL

## 2023-08-16 VITALS
BODY MASS INDEX: 32.58 KG/M2 | DIASTOLIC BLOOD PRESSURE: 66 MMHG | TEMPERATURE: 98 F | RESPIRATION RATE: 14 BRPM | HEART RATE: 67 BPM | OXYGEN SATURATION: 97 % | WEIGHT: 208 LBS | SYSTOLIC BLOOD PRESSURE: 107 MMHG

## 2023-08-16 DIAGNOSIS — Z79.811 LONG TERM (CURRENT) USE OF AROMATASE INHIBITORS: ICD-10-CM

## 2023-08-16 DIAGNOSIS — Z98.890 HISTORY OF LUMPECTOMY OF LEFT BREAST: ICD-10-CM

## 2023-08-16 DIAGNOSIS — C50.912 INFILTRATING DUCTAL CARCINOMA OF LEFT BREAST (HCC): Primary | ICD-10-CM

## 2023-08-16 PROCEDURE — 99213 OFFICE O/P EST LOW 20 MIN: CPT | Performed by: INTERNAL MEDICINE

## 2023-08-16 NOTE — PROGRESS NOTES
Ramona Stringer is a 57 y.o. female    Chief Complaint   Patient presents with    Follow-up     breast cancer       1. Have you been to the ER, urgent care clinic since your last visit? Hospitalized since your last visit? No    2. Have you seen or consulted any other health care providers outside of the [address] [place] [address] since your last visit? Include any pap smears or colon screening.  Yes, colonoscopy 3/3/2023 with Lisa Kahn- Dr. Don Grey MD

## 2023-08-17 ENCOUNTER — OFFICE VISIT (OUTPATIENT)
Age: 63
End: 2023-08-17
Payer: COMMERCIAL

## 2023-08-17 VITALS
DIASTOLIC BLOOD PRESSURE: 64 MMHG | WEIGHT: 206.4 LBS | BODY MASS INDEX: 32.39 KG/M2 | SYSTOLIC BLOOD PRESSURE: 124 MMHG | TEMPERATURE: 98.3 F | RESPIRATION RATE: 16 BRPM | OXYGEN SATURATION: 96 % | HEIGHT: 67 IN | HEART RATE: 75 BPM

## 2023-08-17 DIAGNOSIS — E55.9 VITAMIN D DEFICIENCY: ICD-10-CM

## 2023-08-17 DIAGNOSIS — E11.9 DIABETES MELLITUS TYPE 2, NONINSULIN DEPENDENT (HCC): Primary | ICD-10-CM

## 2023-08-17 DIAGNOSIS — E04.2 MULTINODULAR GOITER: ICD-10-CM

## 2023-08-17 DIAGNOSIS — Z11.4 SCREENING FOR HUMAN IMMUNODEFICIENCY VIRUS WITHOUT PRESENCE OF RISK FACTORS: ICD-10-CM

## 2023-08-17 DIAGNOSIS — E78.2 MIXED HYPERLIPIDEMIA: ICD-10-CM

## 2023-08-17 LAB
25(OH)D3 SERPL-MCNC: 37.7 NG/ML (ref 30–100)
ALT SERPL-CCNC: 24 U/L (ref 12–78)
ANION GAP SERPL CALC-SCNC: 6 MMOL/L (ref 5–15)
AST SERPL-CCNC: 15 U/L (ref 15–37)
BUN SERPL-MCNC: 11 MG/DL (ref 6–20)
BUN/CREAT SERPL: 16 (ref 12–20)
CALCIUM SERPL-MCNC: 9.2 MG/DL (ref 8.5–10.1)
CHLORIDE SERPL-SCNC: 107 MMOL/L (ref 97–108)
CHOLEST SERPL-MCNC: 127 MG/DL
CO2 SERPL-SCNC: 27 MMOL/L (ref 21–32)
CREAT SERPL-MCNC: 0.7 MG/DL (ref 0.55–1.02)
EST. AVERAGE GLUCOSE BLD GHB EST-MCNC: 140 MG/DL
GLUCOSE SERPL-MCNC: 126 MG/DL (ref 65–100)
HBA1C MFR BLD: 6.5 % (ref 4–5.6)
HDLC SERPL-MCNC: 59 MG/DL
HDLC SERPL: 2.2 (ref 0–5)
HIV 1+2 AB+HIV1 P24 AG SERPL QL IA: NONREACTIVE
HIV 1/2 RESULT COMMENT: NORMAL
LDLC SERPL CALC-MCNC: 40 MG/DL (ref 0–100)
POTASSIUM SERPL-SCNC: 4 MMOL/L (ref 3.5–5.1)
SODIUM SERPL-SCNC: 140 MMOL/L (ref 136–145)
TRIGL SERPL-MCNC: 140 MG/DL
TSH SERPL DL<=0.05 MIU/L-ACNC: 0.15 UIU/ML (ref 0.36–3.74)
VLDLC SERPL CALC-MCNC: 28 MG/DL

## 2023-08-17 PROCEDURE — 3044F HG A1C LEVEL LT 7.0%: CPT | Performed by: FAMILY MEDICINE

## 2023-08-17 PROCEDURE — 99214 OFFICE O/P EST MOD 30 MIN: CPT | Performed by: FAMILY MEDICINE

## 2023-08-17 SDOH — ECONOMIC STABILITY: FOOD INSECURITY: WITHIN THE PAST 12 MONTHS, YOU WORRIED THAT YOUR FOOD WOULD RUN OUT BEFORE YOU GOT MONEY TO BUY MORE.: NEVER TRUE

## 2023-08-17 SDOH — ECONOMIC STABILITY: FOOD INSECURITY: WITHIN THE PAST 12 MONTHS, THE FOOD YOU BOUGHT JUST DIDN'T LAST AND YOU DIDN'T HAVE MONEY TO GET MORE.: NEVER TRUE

## 2023-08-17 SDOH — ECONOMIC STABILITY: INCOME INSECURITY: HOW HARD IS IT FOR YOU TO PAY FOR THE VERY BASICS LIKE FOOD, HOUSING, MEDICAL CARE, AND HEATING?: NOT HARD AT ALL

## 2023-08-17 SDOH — ECONOMIC STABILITY: HOUSING INSECURITY
IN THE LAST 12 MONTHS, WAS THERE A TIME WHEN YOU DID NOT HAVE A STEADY PLACE TO SLEEP OR SLEPT IN A SHELTER (INCLUDING NOW)?: NO

## 2023-08-17 ASSESSMENT — ENCOUNTER SYMPTOMS
RESPIRATORY NEGATIVE: 1
GASTROINTESTINAL NEGATIVE: 1
EYES NEGATIVE: 1

## 2023-08-17 ASSESSMENT — PATIENT HEALTH QUESTIONNAIRE - PHQ9
SUM OF ALL RESPONSES TO PHQ QUESTIONS 1-9: 0
SUM OF ALL RESPONSES TO PHQ QUESTIONS 1-9: 0
1. LITTLE INTEREST OR PLEASURE IN DOING THINGS: 0
SUM OF ALL RESPONSES TO PHQ9 QUESTIONS 1 & 2: 0
2. FEELING DOWN, DEPRESSED OR HOPELESS: 0
SUM OF ALL RESPONSES TO PHQ QUESTIONS 1-9: 0
SUM OF ALL RESPONSES TO PHQ QUESTIONS 1-9: 0

## 2023-08-17 NOTE — PROGRESS NOTES
Chief Complaint   Patient presents with    Diabetes     Fasting follow up    Cholesterol Problem     1. \"Have you been to the ER, urgent care clinic since your last visit? Hospitalized since your last visit? \" No    2. \"Have you seen or consulted any other health care providers outside of the [address] [place] [address] Avenue since your last visit? \"  GI in March , Dr Laurence Wilson yesterday, radiation/oncologist Dr Miracle Louie in May    3. For patients aged 43-73: Has the patient had a colonoscopy / FIT/ Cologuard? Yes - no Care Gap present  3/2023 repeat 1 year Dr Kami Davis      If the patient is female:    4. For patients aged 43-66: Has the patient had a mammogram within the past 2 years? Yes - no Care Gap present 2/2023      5. For patients aged 21-65: Has the patient had a pap smear?  Yes - no Care Gap present gyn Dr Dawn Barry

## 2023-08-17 NOTE — ASSESSMENT & PLAN NOTE
Diagnosed 2021; Low TSH and MNG; Evaluation Endo Dr. Naveen uHff; Thyroid US, Right Nodule; Thyroid Uptake Scan, \"Hot nodule\"; no biopsy needed;  Hot nodule is the cause of mild hyperthyroidism; PCP to monitor TSH and f/u Endo prn only or if TSH goes <0.10 or symptoms of hyperthyroidism

## 2023-08-18 NOTE — PROGRESS NOTES
Chief Complaint   Patient presents with    Diabetes     Fasting follow up    Cholesterol Problem     HISTORY OF PRESENT ILLNESS   6 month fasting follow up Type 2 NIDDM, Hyperlipidemia, labs and medication check. Complying routinely w/ medication regimen and tolerating w/o reaction or side effects. Had not been checking BS's for a while but in preparation for today's visit the past 2-3 weeks has been checking BS's almost daily since then, prior that was checking maybe 1-2 x a week. The past few weeks checking fasting in the -140-150. Max was 172 one time. No hypoglycemia. Diet: Noom Diet since 7-2021; variable compliance with it since starting it     Caffeine: 2-3 cups of half caff coffee a day, tea at lunch a few x a week, Diet Coke maybe once a month      Exercise: walks almost daily (at least 5 x a week) x 30-45 minutes    Weight: happy her wt has been gradually coming down w/ eating healthier and increasing her exercise over the years; got down from the 240 lb range in 2019 to 198-200 range now and maintaining in that range    REVIEW OF SYMPTOMS   Review of Systems   Constitutional: Negative. Eyes: Negative. Respiratory: Negative. Cardiovascular: Negative. Gastrointestinal: Negative. Endocrine: Negative. Genitourinary: Negative. Musculoskeletal:  Negative for myalgias. Got CSI right hip 2-2023 per ortho for bursitis   Neurological: Negative. PROBLEM LIST/MEDICAL HISTORY     Patient Active Problem List    Diagnosis Date Noted    Long term (current) use of aromatase inhibitors 08/16/2023    Infiltrating ductal carcinoma of left breast ([address]) 02/02/2022     Overview Note:     02/02/22: LEFT breast bx. PATH: IDC, largest focus measures 10mm, low   grade, ER+(99%)/KS+(45%)/HER2-, Ki-67 15%. Clinical stage 1.  - MammaPrint: Low-risk, luminal A-type, +0.208.  03/31/22: LEFT breast lumpectomy and SLNBx.  PATH: IDC with lobular   features, 17mm, grade 1,

## 2023-10-18 NOTE — PATIENT INSTRUCTIONS
1) Your TSH (thyroid test) was slightly low at 0.26 in 12/20 and lower at 0.15 in 2/21. We'll repeat this today along with your free T4 and T3 level and thyroid antibody level to see if you truly have a thyroid condition that needs further evaluation or treatment. 2) I will order a thyroid ultrasound to look at the size of your thyroid and for any nodules that could be the cause of your TSH going low. Please call the Patient Care team at [phone] to schedule this appointment at your earliest convenience    3) I will send you a message through [address] with your lab results and ultrasound to determine a plan. 4) The address is [address] [place] [address], [address] in the [facility] ([facility] 2.4 3.4 2.3

## 2023-11-06 ENCOUNTER — PATIENT MESSAGE (OUTPATIENT)
Age: 63
End: 2023-11-06

## 2023-11-06 NOTE — PROGRESS NOTES
LEFT BREAST SENTINEL NODE BIOPSY (PSEUDOCHOLINESTERASE DEFICIENCY) performed by Deb Deshpande MD at [address]    benign fibroadenoma right breast    [address]    Emergency for fetal distress    CHOLECYSTECTOMY, LAPAROSCOPIC  1998    gallstones    COLONOSCOPY  07/11/2016    Polyps, Multiple Diverticulae, Internal Hemorrhoids; f/u 5 yrs, Dr Cristhian Fofana      2010    COLONOSCOPY  03/03/2023    repeat 1 year Dr Melody Juan    COLONOSCOPY  11/30/2021    Diverticulosis, incomplete study due to poor prep, repeat 1 yr    DILATION AND CURETTAGE OF UTERUS  10/08/2014    AUB & Uterine Polyp (HYSTEROSCOPY)    G[place]YS[place]  1984; Dr. Anabel Peterson    Tonsillectomy; anesthesia reaction    HYSTEROSCOPY,W/ENDO BX  05/2014    AUB; Negative    SKIN BIOPSY      2016    TONSILLECTOMY AND ADENOIDECTOMY  1981    UPPER GASTROINTESTINAL ENDOSCOPY  11/30/2021    UPPER GASTROINTESTINAL ENDOSCOPY  07/11/2016    EGD: Hiatal Hernia, Grade 1 Esophagitis w/ reflux, Gastritis,  Dr Mitul Patel Result (most recent):  [facility] DIGITAL DIAGNOSTIC BILATERAL 02/10/2023    Narrative  This is a summary report. The complete report is available in the patient's medical record. If you cannot access the medical record, please contact the sending organization for a detailed fax or copy. STUDY:  Bilateral Diagnostic Digital Mammography including 3-D Tomosynthesis    INDICATION:  Left lumpectomy for carcinoma in 2022    COMPARISON:  Prior studies dating back to 2019    BREAST COMPOSITION: There are scattered areas of fibroglandular density. FINDINGS: Bilateral digital diagnostic mammography was performed, and is  interpreted in conjunction with a computer assisted detection (CAD) system. In  addition to standard 2-D bilateral CC and MLO views, bilateral 3-D/tomosynthesis  was performed in CC and MLO projections.  Additionally, magnification views were  performed of the left breast.    Lumpectomy

## 2023-11-07 ENCOUNTER — OFFICE VISIT (OUTPATIENT)
Age: 63
End: 2023-11-07
Payer: COMMERCIAL

## 2023-11-07 VITALS — WEIGHT: 202 LBS | HEIGHT: 67 IN | BODY MASS INDEX: 31.71 KG/M2

## 2023-11-07 DIAGNOSIS — Z92.3 S/P RADIATION THERAPY: ICD-10-CM

## 2023-11-07 DIAGNOSIS — C50.912 MALIGNANT NEOPLASM OF LEFT BREAST IN FEMALE, ESTROGEN RECEPTOR POSITIVE, UNSPECIFIED SITE OF BREAST (HCC): Primary | ICD-10-CM

## 2023-11-07 DIAGNOSIS — Z17.0 MALIGNANT NEOPLASM OF LEFT BREAST IN FEMALE, ESTROGEN RECEPTOR POSITIVE, UNSPECIFIED SITE OF BREAST (HCC): Primary | ICD-10-CM

## 2023-11-07 DIAGNOSIS — Z85.3 HISTORY OF BREAST CANCER IN FEMALE: ICD-10-CM

## 2023-11-07 DIAGNOSIS — Z98.890 S/P LUMPECTOMY OF BREAST: ICD-10-CM

## 2023-11-07 PROCEDURE — 1123F ACP DISCUSS/DSCN MKR DOCD: CPT | Performed by: NURSE PRACTITIONER

## 2023-11-07 PROCEDURE — 99213 OFFICE O/P EST LOW 20 MIN: CPT | Performed by: NURSE PRACTITIONER

## 2023-11-07 RX ORDER — LIRAGLUTIDE 6 MG/ML
0.6 INJECTION SUBCUTANEOUS DAILY
Qty: 2 ADJUSTABLE DOSE PRE-FILLED PEN SYRINGE | Refills: 1 | Status: SHIPPED | OUTPATIENT
Start: 2023-11-07

## 2023-11-07 NOTE — TELEPHONE ENCOUNTER
LOV 8/17/23 due f/u in Feb scheduled for 2/20/24          From: Olivia García  To: Dr. Isidoro Adame: 11/6/2023  9:45 AM EST  Subject: Upcoming change in insurance re: Victoza    I will be on Medicare effective 1-1-24. In reviewing Part D plans, I have become concerned about  having enough Victoza as I transition to different insurance. I have zero refills left and I often experience delays in getting this Rx filled. Can you prescribe more so I won't run out as I transition to new insurance?

## 2023-11-09 DIAGNOSIS — E11.9 TYPE 2 DIABETES MELLITUS WITHOUT COMPLICATIONS (HCC): ICD-10-CM

## 2023-11-09 RX ORDER — ANASTROZOLE 1 MG/1
TABLET ORAL
Qty: 90 TABLET | Refills: 1 | Status: SHIPPED | OUTPATIENT
Start: 2023-11-09

## 2023-11-10 RX ORDER — METFORMIN HYDROCHLORIDE 500 MG/1
TABLET, EXTENDED RELEASE ORAL
Qty: 180 TABLET | Refills: 1 | Status: SHIPPED | OUTPATIENT
Start: 2023-11-10

## 2023-11-17 ENCOUNTER — NURSE TRIAGE (OUTPATIENT)
Dept: OTHER | Facility: CLINIC | Age: 63
End: 2023-11-17

## 2023-11-17 NOTE — TELEPHONE ENCOUNTER
Location of patient: [facility]    Received call from [facility] at [facility] with [employer]. Subjective: Caller states \"right thumb injury \"     Current Symptoms: right thumb injury closed in car door yesterday , swollen and bruised bleeding from under nail , more swollen today , can move it , looks black under nail, no deformity sl numbness hx diabetes , also needs tetanus updated     Onset: yesterday     Associated Symptoms: NA    Pain Severity: 6/10    Temperature: none    What has been tried: advil , ice , tylenol    LMP: NA Pregnant: NA    Recommended disposition: See in Office Today    Care advice provided, patient verbalizes understanding; denies any other questions or concerns; instructed to call back for any new or worsening symptoms. Patient/Caller agrees with recommended disposition; writer provided warm transfer to [place] [facility] for appointment scheduling    Attention Provider: Thank you for allowing me to participate in the care of your patient. The patient was connected to triage in response to information provided to the ECC/PSC. Please do not respond through this encounter as the response is not directed to a shared pool.          Reason for Disposition   SEVERE pain (e.g., excruciating)    Protocols used: Hand and Wrist Injury-ADULT-[place]

## 2024-02-09 ENCOUNTER — TELEPHONE (OUTPATIENT)
Age: 64
End: 2024-02-09

## 2024-02-09 DIAGNOSIS — C50.912 INFILTRATING DUCTAL CARCINOMA OF LEFT BREAST (HCC): Primary | ICD-10-CM

## 2024-02-09 DIAGNOSIS — M89.8X9 BONE PAIN: ICD-10-CM

## 2024-02-09 NOTE — PROGRESS NOTES
[facility] [place] at [facility]  [address] [place] [address], [address] [place] [id number]  W: [phone]  F: [phone]    Reason for Visit:   Staci García is a 63 y.o. female seen today in office for follow up of Left Breast Cancer.     Treatment History:   Per Surgery Note:  02/02/22: LEFT Breast Bx. PATH: IDC, largest focus measures 10mm, low grade, ER+(99%)/KS+(45%)/HER2-, Ki-67 15%.   Clinical Stage 1  MammaPrint: Low-risk, luminal A-type, +0.208.  03/31/22: LEFT Breast Lumpectomy and SLNBx. - Dr. Varner  PATH: IDC with lobular features, 17mm, grade 1, ER+(99%)/KS+(45%)/HER2-, Ki-67 15%, positive margins, 4 benign LN (0/4). LCIS present  Pathologic stage: pT1c, pN0  04/14/22: LEFT Breast Lumpectomy re-excision of lateral margin. PATH: Benign breast tissue with extensive fat necrosis and focal usual duct hyperplasia. No evidence of carcinoma.  Completed XRT with Dr. Morales on 6/15/22  Adjuvant hormonal therapy with Anastrozole 7/4/22 - Current     STAGE: 1 T1cN0 ER+ [id number]    History of Present Illness:   Staci García is a 63 y.o. female seen today for office 6 month follow up of Left Breast Cancer on adjuvant Anastrozole since 6/22. She reports that she feels well overall today. She has constant collar bone pain, intensity worsening. She has a history of collar bone fracture in the 1980s. She saw Ortho a couple of weeks ago, and received cortisone shot in left shoulder. She is tolerating Anastrozole with some joint pain. She has very occasional, mild hot flashes. Her appetite and energy levels are good. She denies fever, chills, mouth sores, cough, SOB, CP, nausea, vomiting, diarrhea, and constipation. She is here alone today.    Past Medical History:   Diagnosis Date    Arthritis Feb 2023    Bone spur of ankle 2018    Bone spur of foot 02/24/2017    Podiatry Dr Grewal    BRCA1 negative     BRCA2 negative     Breast cancer (HCC) 2022    Left    Diabetes mellitus type 2,

## 2024-02-09 NOTE — TELEPHONE ENCOUNTER
Pt called she said that she doesn't know if her medicare insurance will cover the victoza.  Advised dthat she will need to check with her insurance to see what medication (in that class) is the preferred medication.  Recommended that she find out what her cost would be.  She said that she was told by customer service that ozempic, turlicity and mounjaro were covered.  Pt will call them back to check which one is preferred.  Pt has an upcoming appt on 2/20 for routine f/u.  She was also told that she is due a Welcome to Medicare visit.  Advised that I can get that scheduled for her which we set for 2/26 @ 2:00.  I went over the questionnaire and asked her to complete that prior to the AWV.

## 2024-02-09 NOTE — TELEPHONE ENCOUNTER
Patient insurance has changed to medicare and her fear is that the medication victoza is not covered by it. Patient is looking for advice as in whether theres an alternative or if she needs to be on the medication anymore.

## 2024-02-12 ENCOUNTER — HOSPITAL ENCOUNTER (OUTPATIENT)
Facility: HOSPITAL | Age: 64
Discharge: HOME | End: 2024-02-15
Payer: MEDICARE

## 2024-02-12 VITALS — BODY MASS INDEX: 31.08 KG/M2 | HEIGHT: 67 IN | WEIGHT: 198 LBS

## 2024-02-12 DIAGNOSIS — Z85.3 HISTORY OF BREAST CANCER IN FEMALE: ICD-10-CM

## 2024-02-12 PROBLEM — E11.9 TYPE 2 DIABETES MELLITUS WITHOUT COMPLICATION, WITHOUT LONG-TERM CURRENT USE OF INSULIN (HCC): Status: ACTIVE | Noted: 2024-02-12

## 2024-02-12 PROBLEM — M89.8X1 COLLAR BONE PAIN: Status: ACTIVE | Noted: 2024-02-12

## 2024-02-12 PROBLEM — Z79.811 USE OF ANASTROZOLE (ARIMIDEX): Status: ACTIVE | Noted: 2024-02-12

## 2024-02-12 PROBLEM — R92.323 SCATTERED FIBROGLANDULAR TISSUE DENSITY OF BOTH BREASTS ON MAMMOGRAPHY: Status: ACTIVE | Noted: 2024-02-12

## 2024-02-12 PROBLEM — E78.00 HIGH CHOLESTEROL: Status: ACTIVE | Noted: 2024-02-12

## 2024-02-12 PROCEDURE — G0279 TOMOSYNTHESIS, MAMMO: HCPCS

## 2024-02-15 ENCOUNTER — OFFICE VISIT (OUTPATIENT)
Age: 64
End: 2024-02-15
Payer: MEDICARE

## 2024-02-15 VITALS
RESPIRATION RATE: 18 BRPM | OXYGEN SATURATION: 96 % | TEMPERATURE: 97.8 F | WEIGHT: 194 LBS | SYSTOLIC BLOOD PRESSURE: 122 MMHG | DIASTOLIC BLOOD PRESSURE: 75 MMHG | BODY MASS INDEX: 30.38 KG/M2 | HEART RATE: 76 BPM

## 2024-02-15 DIAGNOSIS — Z79.811 USE OF ANASTROZOLE (ARIMIDEX): ICD-10-CM

## 2024-02-15 DIAGNOSIS — E11.9 TYPE 2 DIABETES MELLITUS WITHOUT COMPLICATION, WITHOUT LONG-TERM CURRENT USE OF INSULIN (HCC): ICD-10-CM

## 2024-02-15 DIAGNOSIS — M89.8X1 COLLAR BONE PAIN: ICD-10-CM

## 2024-02-15 DIAGNOSIS — Z85.3 PERSONAL HISTORY OF BREAST CANCER: ICD-10-CM

## 2024-02-15 DIAGNOSIS — C50.912 INFILTRATING DUCTAL CARCINOMA OF LEFT BREAST (HCC): Primary | ICD-10-CM

## 2024-02-15 DIAGNOSIS — Z98.890 HISTORY OF LUMPECTOMY OF LEFT BREAST: ICD-10-CM

## 2024-02-15 DIAGNOSIS — E78.00 HIGH CHOLESTEROL: ICD-10-CM

## 2024-02-15 DIAGNOSIS — R92.323 SCATTERED FIBROGLANDULAR TISSUE DENSITY OF BOTH BREASTS ON MAMMOGRAPHY: ICD-10-CM

## 2024-02-15 PROCEDURE — 1036F TOBACCO NON-USER: CPT | Performed by: NURSE PRACTITIONER

## 2024-02-15 PROCEDURE — 99213 OFFICE O/P EST LOW 20 MIN: CPT | Performed by: NURSE PRACTITIONER

## 2024-02-15 PROCEDURE — 1123F ACP DISCUSS/DSCN MKR DOCD: CPT | Performed by: NURSE PRACTITIONER

## 2024-02-15 PROCEDURE — G8417 CALC BMI ABV UP PARAM F/U: HCPCS | Performed by: NURSE PRACTITIONER

## 2024-02-15 PROCEDURE — G8400 PT W/DXA NO RESULTS DOC: HCPCS | Performed by: NURSE PRACTITIONER

## 2024-02-15 PROCEDURE — 3046F HEMOGLOBIN A1C LEVEL >9.0%: CPT | Performed by: NURSE PRACTITIONER

## 2024-02-15 PROCEDURE — 2022F DILAT RTA XM EVC RTNOPTHY: CPT | Performed by: NURSE PRACTITIONER

## 2024-02-15 PROCEDURE — G8484 FLU IMMUNIZE NO ADMIN: HCPCS | Performed by: NURSE PRACTITIONER

## 2024-02-15 PROCEDURE — G8427 DOCREV CUR MEDS BY ELIG CLIN: HCPCS | Performed by: NURSE PRACTITIONER

## 2024-02-15 PROCEDURE — 3017F COLORECTAL CA SCREEN DOC REV: CPT | Performed by: NURSE PRACTITIONER

## 2024-02-15 PROCEDURE — 1090F PRES/ABSN URINE INCON ASSESS: CPT | Performed by: NURSE PRACTITIONER

## 2024-02-15 RX ORDER — ANASTROZOLE 1 MG/1
1 TABLET ORAL DAILY
Qty: 90 TABLET | Refills: 3 | Status: SHIPPED | OUTPATIENT
Start: 2024-02-15

## 2024-02-15 NOTE — PROGRESS NOTES
Staci García is a 63 y.o. female    Chief Complaint   Patient presents with    Follow-up     Left Breast Cancer       1. Have you been to the ER, urgent care clinic since your last visit?  Hospitalized since your last visit? Yes, [facility]  [address], injured finger on 11/16/23    2. Have you seen or consulted any other health care providers outside of the [facility] since your last visit?  Include any pap smears or colon screening. Yes, [facility] - Dr. Morillo' KARTHIK Loja 1/16/24, Dr. Clayton Adam- [facility]

## 2024-02-16 ENCOUNTER — HOSPITAL ENCOUNTER (OUTPATIENT)
Facility: HOSPITAL | Age: 64
End: 2024-02-16
Payer: MEDICARE

## 2024-02-16 DIAGNOSIS — C50.912 INFILTRATING DUCTAL CARCINOMA OF LEFT BREAST (HCC): ICD-10-CM

## 2024-02-16 DIAGNOSIS — M89.8X9 BONE PAIN: ICD-10-CM

## 2024-02-16 PROCEDURE — 3430000000 HC RX DIAGNOSTIC RADIOPHARMACEUTICAL: Performed by: NURSE PRACTITIONER

## 2024-02-16 PROCEDURE — 78306 BONE IMAGING WHOLE BODY: CPT | Performed by: NURSE PRACTITIONER

## 2024-02-16 PROCEDURE — A9503 TC99M MEDRONATE: HCPCS | Performed by: NURSE PRACTITIONER

## 2024-02-16 RX ORDER — TC 99M MEDRONATE 20 MG/10ML
21.5 INJECTION, POWDER, LYOPHILIZED, FOR SOLUTION INTRAVENOUS
Status: COMPLETED | OUTPATIENT
Start: 2024-02-16 | End: 2024-02-16

## 2024-02-16 RX ADMIN — TC 99M MEDRONATE 21.5 MILLICURIE: 20 INJECTION, POWDER, LYOPHILIZED, FOR SOLUTION INTRAVENOUS at 11:09

## 2024-02-20 ENCOUNTER — OFFICE VISIT (OUTPATIENT)
Age: 64
End: 2024-02-20
Payer: MEDICARE

## 2024-02-20 VITALS
OXYGEN SATURATION: 99 % | BODY MASS INDEX: 30.54 KG/M2 | RESPIRATION RATE: 16 BRPM | DIASTOLIC BLOOD PRESSURE: 78 MMHG | TEMPERATURE: 98.7 F | HEIGHT: 67 IN | HEART RATE: 79 BPM | SYSTOLIC BLOOD PRESSURE: 122 MMHG | WEIGHT: 194.6 LBS

## 2024-02-20 DIAGNOSIS — E11.9 DIABETES MELLITUS TYPE 2, NONINSULIN DEPENDENT (HCC): Primary | ICD-10-CM

## 2024-02-20 DIAGNOSIS — E04.2 MULTINODULAR GOITER: ICD-10-CM

## 2024-02-20 DIAGNOSIS — Z23 NEED FOR 23-POLYVALENT PNEUMOCOCCAL POLYSACCHARIDE VACCINE: ICD-10-CM

## 2024-02-20 DIAGNOSIS — E78.2 MIXED HYPERLIPIDEMIA: ICD-10-CM

## 2024-02-20 DIAGNOSIS — Z23 ENCOUNTER FOR IMMUNIZATION: ICD-10-CM

## 2024-02-20 LAB
ERYTHROCYTE [DISTWIDTH] IN BLOOD BY AUTOMATED COUNT: 12.8 % (ref 11.5–14.5)
EST. AVERAGE GLUCOSE BLD GHB EST-MCNC: 137 MG/DL
HBA1C MFR BLD: 6.4 % (ref 4–5.6)
HCT VFR BLD AUTO: 43.5 % (ref 35–47)
HGB BLD-MCNC: 14.7 G/DL (ref 11.5–16)
MCH RBC QN AUTO: 30.5 PG (ref 26–34)
MCHC RBC AUTO-ENTMCNC: 33.8 G/DL (ref 30–36.5)
MCV RBC AUTO: 90.2 FL (ref 80–99)
NRBC # BLD: 0 K/UL (ref 0–0.01)
NRBC BLD-RTO: 0 PER 100 WBC
PLATELET # BLD AUTO: 227 K/UL (ref 150–400)
PMV BLD AUTO: 10.1 FL (ref 8.9–12.9)
RBC # BLD AUTO: 4.82 M/UL (ref 3.8–5.2)
WBC # BLD AUTO: 7.1 K/UL (ref 3.6–11)

## 2024-02-20 PROCEDURE — 1090F PRES/ABSN URINE INCON ASSESS: CPT | Performed by: FAMILY MEDICINE

## 2024-02-20 PROCEDURE — G8400 PT W/DXA NO RESULTS DOC: HCPCS | Performed by: FAMILY MEDICINE

## 2024-02-20 PROCEDURE — 1123F ACP DISCUSS/DSCN MKR DOCD: CPT | Performed by: FAMILY MEDICINE

## 2024-02-20 PROCEDURE — G8417 CALC BMI ABV UP PARAM F/U: HCPCS | Performed by: FAMILY MEDICINE

## 2024-02-20 PROCEDURE — 2022F DILAT RTA XM EVC RTNOPTHY: CPT | Performed by: FAMILY MEDICINE

## 2024-02-20 PROCEDURE — 1036F TOBACCO NON-USER: CPT | Performed by: FAMILY MEDICINE

## 2024-02-20 PROCEDURE — G8427 DOCREV CUR MEDS BY ELIG CLIN: HCPCS | Performed by: FAMILY MEDICINE

## 2024-02-20 PROCEDURE — G8484 FLU IMMUNIZE NO ADMIN: HCPCS | Performed by: FAMILY MEDICINE

## 2024-02-20 PROCEDURE — 3017F COLORECTAL CA SCREEN DOC REV: CPT | Performed by: FAMILY MEDICINE

## 2024-02-20 PROCEDURE — 3046F HEMOGLOBIN A1C LEVEL >9.0%: CPT | Performed by: FAMILY MEDICINE

## 2024-02-20 PROCEDURE — PBSHW PR ADMIN PNEUMOCOCCAL VACCINE: Performed by: FAMILY MEDICINE

## 2024-02-20 PROCEDURE — 99214 OFFICE O/P EST MOD 30 MIN: CPT | Performed by: FAMILY MEDICINE

## 2024-02-20 PROCEDURE — PBSHW PNEUMOCOCCAL, PPSV23, PNEUMOVAX 23, (AGE 2 YRS+), SC/IM: Performed by: FAMILY MEDICINE

## 2024-02-20 PROCEDURE — 90732 PPSV23 VACC 2 YRS+ SUBQ/IM: CPT | Performed by: FAMILY MEDICINE

## 2024-02-20 RX ORDER — PNV NO.95/FERROUS FUM/FOLIC AC 28MG-0.8MG
1 TABLET ORAL DAILY
COMMUNITY

## 2024-02-20 RX ORDER — POLYETHYLENE GLYCOL 3350 17 G/17G
17 POWDER, FOR SOLUTION ORAL DAILY PRN
COMMUNITY

## 2024-02-20 RX ORDER — ROSUVASTATIN CALCIUM 10 MG/1
10 TABLET, COATED ORAL NIGHTLY
Qty: 90 TABLET | Refills: 1 | Status: SHIPPED | OUTPATIENT
Start: 2024-02-20

## 2024-02-20 ASSESSMENT — ENCOUNTER SYMPTOMS
EYES NEGATIVE: 1
RESPIRATORY NEGATIVE: 1
VOMITING: 0
NAUSEA: 0

## 2024-02-20 ASSESSMENT — PATIENT HEALTH QUESTIONNAIRE - PHQ9
SUM OF ALL RESPONSES TO PHQ QUESTIONS 1-9: 0
2. FEELING DOWN, DEPRESSED OR HOPELESS: 0
SUM OF ALL RESPONSES TO PHQ QUESTIONS 1-9: 0
1. LITTLE INTEREST OR PLEASURE IN DOING THINGS: 0
SUM OF ALL RESPONSES TO PHQ9 QUESTIONS 1 & 2: 0

## 2024-02-20 NOTE — PROGRESS NOTES
Chief Complaint   Patient presents with    Diabetes     6 month fasting follow up    Cholesterol Problem    Medication Check     HISTORY OF PRESENT ILLNESS   HPI  6 month fasting follow up Type 2 NIDDM, Hypercholesterolemia, labs and medication check.  Patient has been maintained on anti-diabetic medication regimen of Metformin  mg 2 tabs daily and low dose Victoza currently 0.6 mg daily. She is now on Medicare and under her new plan the Victoza will no longer be covered. She sent a list of alternatives that are all covered under tier 2 whereas the Victoza will be tier 3. She is amenable to any but would prefer to remain on lowest dose due to possible GI side effects. She feels like ever since being on Victoza her digestive transit is slower making her more prone towards constipation. She takes Miralax prn which does help. Doesn't take it daily bc she wasn't sure if she could. She has no abdominal pain, N/V/D. No blood in stool. She checks her BS's 2-3 x a month.  Fsating in AM runs 116-121. Highest reading was 160 one morning which she a/t her meal the night before.  No signs/symptoms of hyper or hypoglycemia  Generally feeling pretty well overall  She continues to do well on the Crestor and needs that sent to new mail order per insurance    Diet: Noom Diet since 7-2021  Caffeine: 2-3 cups of half caff coffee a day, tea at lunch a few x a week, Diet Coke maybe once a month    Exercise: walks almost daily (at least 5 x a week) x 30-45 minutes  Weight: happy her wt has been gradually coming down w/ eating healthier and increasing her exercise over the years; got down from the 240 lb range in 2019 to 194-200 range now and maintaining in that range      REVIEW OF SYMPTOMS   Review of Systems   Constitutional: Negative.    Eyes: Negative.    Respiratory: Negative.     Cardiovascular: Negative.    Gastrointestinal:  Negative for nausea and vomiting.   Endocrine: Negative.    Genitourinary: Negative.

## 2024-02-21 LAB
ALBUMIN SERPL-MCNC: 4.1 G/DL (ref 3.5–5)
ALBUMIN/GLOB SERPL: 1.3 (ref 1.1–2.2)
ALP SERPL-CCNC: 62 U/L (ref 45–117)
ALT SERPL-CCNC: 24 U/L (ref 12–78)
ANION GAP SERPL CALC-SCNC: 3 MMOL/L (ref 5–15)
AST SERPL-CCNC: 16 U/L (ref 15–37)
BILIRUB SERPL-MCNC: 0.6 MG/DL (ref 0.2–1)
BUN SERPL-MCNC: 10 MG/DL (ref 6–20)
BUN/CREAT SERPL: 13 (ref 12–20)
CALCIUM SERPL-MCNC: 9.2 MG/DL (ref 8.5–10.1)
CHLORIDE SERPL-SCNC: 106 MMOL/L (ref 97–108)
CHOLEST SERPL-MCNC: 154 MG/DL
CO2 SERPL-SCNC: 27 MMOL/L (ref 21–32)
CREAT SERPL-MCNC: 0.76 MG/DL (ref 0.55–1.02)
CREAT UR-MCNC: 19.2 MG/DL
GLOBULIN SER CALC-MCNC: 3.1 G/DL (ref 2–4)
GLUCOSE SERPL-MCNC: 137 MG/DL (ref 65–100)
HDLC SERPL-MCNC: 70 MG/DL
HDLC SERPL: 2.2 (ref 0–5)
LDLC SERPL CALC-MCNC: 58.6 MG/DL (ref 0–100)
MICROALBUMIN UR-MCNC: <0.5 MG/DL
MICROALBUMIN/CREAT UR-RTO: <26 MG/G (ref 0–30)
POTASSIUM SERPL-SCNC: 3.8 MMOL/L (ref 3.5–5.1)
PROT SERPL-MCNC: 7.2 G/DL (ref 6.4–8.2)
SODIUM SERPL-SCNC: 136 MMOL/L (ref 136–145)
TRIGL SERPL-MCNC: 127 MG/DL
TSH SERPL DL<=0.05 MIU/L-ACNC: 0.37 UIU/ML (ref 0.36–3.74)
VLDLC SERPL CALC-MCNC: 25.4 MG/DL

## 2024-04-02 ENCOUNTER — TELEMEDICINE (OUTPATIENT)
Age: 64
End: 2024-04-02
Payer: MEDICARE

## 2024-04-02 DIAGNOSIS — J06.9 UPPER RESPIRATORY TRACT INFECTION DUE TO COVID-19 VIRUS: Primary | ICD-10-CM

## 2024-04-02 DIAGNOSIS — U07.1 UPPER RESPIRATORY TRACT INFECTION DUE TO COVID-19 VIRUS: Primary | ICD-10-CM

## 2024-04-02 PROCEDURE — G8427 DOCREV CUR MEDS BY ELIG CLIN: HCPCS | Performed by: FAMILY MEDICINE

## 2024-04-02 PROCEDURE — 99213 OFFICE O/P EST LOW 20 MIN: CPT | Performed by: FAMILY MEDICINE

## 2024-04-02 PROCEDURE — 1090F PRES/ABSN URINE INCON ASSESS: CPT | Performed by: FAMILY MEDICINE

## 2024-04-02 PROCEDURE — G8400 PT W/DXA NO RESULTS DOC: HCPCS | Performed by: FAMILY MEDICINE

## 2024-04-02 PROCEDURE — 1123F ACP DISCUSS/DSCN MKR DOCD: CPT | Performed by: FAMILY MEDICINE

## 2024-04-02 PROCEDURE — 3017F COLORECTAL CA SCREEN DOC REV: CPT | Performed by: FAMILY MEDICINE

## 2024-04-02 RX ORDER — BENZONATATE 100 MG/1
100 CAPSULE ORAL 3 TIMES DAILY PRN
Qty: 21 CAPSULE | Refills: 0 | Status: SHIPPED | OUTPATIENT
Start: 2024-04-02 | End: 2024-04-09

## 2024-04-02 ASSESSMENT — ENCOUNTER SYMPTOMS
STRIDOR: 0
TROUBLE SWALLOWING: 0
COUGH: 1
SINUS PAIN: 0
SORE THROAT: 0
SHORTNESS OF BREATH: 0

## 2024-04-02 ASSESSMENT — PATIENT HEALTH QUESTIONNAIRE - PHQ9
SUM OF ALL RESPONSES TO PHQ QUESTIONS 1-9: 0
SUM OF ALL RESPONSES TO PHQ9 QUESTIONS 1 & 2: 0
2. FEELING DOWN, DEPRESSED OR HOPELESS: NOT AT ALL
1. LITTLE INTEREST OR PLEASURE IN DOING THINGS: NOT AT ALL
SUM OF ALL RESPONSES TO PHQ QUESTIONS 1-9: 0

## 2024-04-02 NOTE — PROGRESS NOTES
Started w/ symptoms 4 nights ago feeling tired. The next day took a Covid test + on Sat 3/30    Nasal congestion, runny nose, post nasal drainage, cough, low grade fever 100.1    No sore throat, chest pain, sob, wheezing    Taking OTC sudafed, sinus rinses, cough drops, 2 Aleve yesterday evening, Tylenol PM Sat night    
daily, Vitamin D3 1,000 units daily, Zinc 25 mg daily as long as not medically contraindicated or based on previous supplement recommendations per HCP.  Follow up if symptoms do not improve/continue to improve and follow up sooner if symptoms persist beyond expectant course.   Proceed to ER for onset of chest pain, pleuritic pain, hemoptysis, sob, wheezing or any other acute changes or other significant concerns.  Follow most up to date CDC Covid vaccination recommendations, safety guidelines, preventive measures, mask wearing recommendations, contact precautions, and social distancing/quarantine guidelines.    .     Patient was evaluated through a synchronous (real-time) audio-video encounter. The patient (or guardian if applicable) is aware that this is a billable service. Verbal consent to proceed has been obtained within the past 12 months. The visit was conducted pursuant to the emergency declaration under the Nixon Act and the National Emergencies Act, 1135 waiver authority and the Coronavirus Preparedness and Response Supplemental Appropriations Act.  Patient identification was verified, and a caregiver was present when appropriate. The patient was located in a state where the provider was credentialed to provide care.       Staci García, was evaluated through a synchronous (real-time) audio-video encounter. The patient (or guardian if applicable) is aware that this is a billable service, which includes applicable co-pays. This Virtual Visit was conducted with patient's (and/or legal guardian's) consent. Patient identification was verified, and a caregiver was present when appropriate.   The patient was located at Home: [id number] [facility] [address]  Provider was located at Facility (Appt Dept): [address] [place] [id number]  Confirm you are appropriately licensed, registered, or certified to deliver care in the state where the patient is located as indicated above. If you are

## 2024-04-26 SDOH — HEALTH STABILITY: PHYSICAL HEALTH: ON AVERAGE, HOW MANY DAYS PER WEEK DO YOU ENGAGE IN MODERATE TO STRENUOUS EXERCISE (LIKE A BRISK WALK)?: 5 DAYS

## 2024-04-26 SDOH — HEALTH STABILITY: PHYSICAL HEALTH: ON AVERAGE, HOW MANY MINUTES DO YOU ENGAGE IN EXERCISE AT THIS LEVEL?: 30 MIN

## 2024-04-26 ASSESSMENT — PATIENT HEALTH QUESTIONNAIRE - PHQ9
SUM OF ALL RESPONSES TO PHQ QUESTIONS 1-9: 1
SUM OF ALL RESPONSES TO PHQ QUESTIONS 1-9: 1
2. FEELING DOWN, DEPRESSED OR HOPELESS: SEVERAL DAYS
SUM OF ALL RESPONSES TO PHQ QUESTIONS 1-9: 1
SUM OF ALL RESPONSES TO PHQ9 QUESTIONS 1 & 2: 1
SUM OF ALL RESPONSES TO PHQ QUESTIONS 1-9: 1
1. LITTLE INTEREST OR PLEASURE IN DOING THINGS: NOT AT ALL

## 2024-04-26 ASSESSMENT — LIFESTYLE VARIABLES
HOW MANY STANDARD DRINKS CONTAINING ALCOHOL DO YOU HAVE ON A TYPICAL DAY: 1
HOW OFTEN DURING THE LAST YEAR HAVE YOU BEEN UNABLE TO REMEMBER WHAT HAPPENED THE NIGHT BEFORE BECAUSE YOU HAD BEEN DRINKING: NEVER
HOW OFTEN DURING THE LAST YEAR HAVE YOU BEEN UNABLE TO REMEMBER WHAT HAPPENED THE NIGHT BEFORE BECAUSE YOU HAD BEEN DRINKING: NEVER
HOW OFTEN DURING THE LAST YEAR HAVE YOU HAD A FEELING OF GUILT OR REMORSE AFTER DRINKING: LESS THAN MONTHLY
HOW OFTEN DO YOU HAVE A DRINK CONTAINING ALCOHOL: 5
HOW OFTEN DURING THE LAST YEAR HAVE YOU NEEDED AN ALCOHOLIC DRINK FIRST THING IN THE MORNING TO GET YOURSELF GOING AFTER A NIGHT OF HEAVY DRINKING: NEVER
HOW OFTEN DO YOU HAVE A DRINK CONTAINING ALCOHOL: 4 OR MORE TIMES A WEEK
HOW OFTEN DURING THE LAST YEAR HAVE YOU NEEDED AN ALCOHOLIC DRINK FIRST THING IN THE MORNING TO GET YOURSELF GOING AFTER A NIGHT OF HEAVY DRINKING: NEVER
HAS A RELATIVE, FRIEND, DOCTOR, OR ANOTHER HEALTH PROFESSIONAL EXPRESSED CONCERN ABOUT YOUR DRINKING OR SUGGESTED YOU CUT DOWN: NO
HOW OFTEN DURING THE LAST YEAR HAVE YOU FAILED TO DO WHAT WAS NORMALLY EXPECTED FROM YOU BECAUSE OF DRINKING: NEVER
HOW OFTEN DURING THE LAST YEAR HAVE YOU FOUND THAT YOU WERE NOT ABLE TO STOP DRINKING ONCE YOU HAD STARTED: NEVER
HOW OFTEN DURING THE LAST YEAR HAVE YOU HAD A FEELING OF GUILT OR REMORSE AFTER DRINKING: LESS THAN MONTHLY
HAVE YOU OR SOMEONE ELSE BEEN INJURED AS A RESULT OF YOUR DRINKING: NO
HAVE YOU OR SOMEONE ELSE BEEN INJURED AS A RESULT OF YOUR DRINKING: NO
HOW OFTEN DO YOU HAVE SIX OR MORE DRINKS ON ONE OCCASION: 1
HOW OFTEN DURING THE LAST YEAR HAVE YOU FAILED TO DO WHAT WAS NORMALLY EXPECTED FROM YOU BECAUSE OF DRINKING: NEVER
HAS A RELATIVE, FRIEND, DOCTOR, OR ANOTHER HEALTH PROFESSIONAL EXPRESSED CONCERN ABOUT YOUR DRINKING OR SUGGESTED YOU CUT DOWN: NO
HOW OFTEN DURING THE LAST YEAR HAVE YOU FOUND THAT YOU WERE NOT ABLE TO STOP DRINKING ONCE YOU HAD STARTED: NEVER
HOW MANY STANDARD DRINKS CONTAINING ALCOHOL DO YOU HAVE ON A TYPICAL DAY: 1 OR 2

## 2024-04-29 ENCOUNTER — OFFICE VISIT (OUTPATIENT)
Age: 64
End: 2024-04-29
Payer: MEDICARE

## 2024-04-29 ENCOUNTER — OFFICE VISIT (OUTPATIENT)
Age: 64
End: 2024-04-29

## 2024-04-29 VITALS
DIASTOLIC BLOOD PRESSURE: 78 MMHG | HEIGHT: 66 IN | RESPIRATION RATE: 18 BRPM | OXYGEN SATURATION: 97 % | TEMPERATURE: 97.6 F | SYSTOLIC BLOOD PRESSURE: 125 MMHG | HEART RATE: 86 BPM | BODY MASS INDEX: 30.29 KG/M2 | WEIGHT: 188.49 LBS

## 2024-04-29 VITALS
DIASTOLIC BLOOD PRESSURE: 76 MMHG | HEIGHT: 66 IN | OXYGEN SATURATION: 97 % | HEART RATE: 87 BPM | SYSTOLIC BLOOD PRESSURE: 110 MMHG | WEIGHT: 188.6 LBS | TEMPERATURE: 98.8 F | RESPIRATION RATE: 16 BRPM | BODY MASS INDEX: 30.31 KG/M2

## 2024-04-29 DIAGNOSIS — Z00.00 WELCOME TO MEDICARE PREVENTIVE VISIT: Primary | ICD-10-CM

## 2024-04-29 DIAGNOSIS — R39.15 URGENCY OF URINATION: Primary | ICD-10-CM

## 2024-04-29 LAB
BILIRUBIN, URINE, POC: NEGATIVE
BLOOD URINE, POC: NORMAL
GLUCOSE URINE, POC: NEGATIVE
KETONES, URINE, POC: NEGATIVE
LEUKOCYTE ESTERASE, URINE, POC: NEGATIVE
NITRITE, URINE, POC: NEGATIVE
PH, URINE, POC: 6 (ref 4.6–8)
PROTEIN,URINE, POC: NEGATIVE
SPECIFIC GRAVITY, URINE, POC: 1.01 (ref 1–1.03)
URINALYSIS CLARITY, POC: CLEAR
URINALYSIS COLOR, POC: NORMAL
UROBILINOGEN, POC: NORMAL

## 2024-04-29 PROCEDURE — 1123F ACP DISCUSS/DSCN MKR DOCD: CPT | Performed by: FAMILY MEDICINE

## 2024-04-29 PROCEDURE — 3017F COLORECTAL CA SCREEN DOC REV: CPT | Performed by: FAMILY MEDICINE

## 2024-04-29 PROCEDURE — G0402 INITIAL PREVENTIVE EXAM: HCPCS | Performed by: FAMILY MEDICINE

## 2024-04-29 RX ORDER — ASCORBIC ACID 500 MG
1000 TABLET ORAL DAILY
COMMUNITY

## 2024-04-29 NOTE — PROGRESS NOTES
Chief Complaint   Patient presents with    welcome to medicare     1. \"Have you been to the ER, urgent care clinic since your last visit?  Hospitalized since your last visit?\" No    2. \"Have you seen or consulted any other health care providers outside of the [facility] since your last visit?\" No    3. For patients aged 45-75: Has the patient had a colonoscopy / FIT/ Cologuard? Yes - no Care Gap present 3/2023       If the patient is female:    4. For patients aged 40-74: Has the patient had a mammogram within the past 2 years? Yes - no Care Gap present 2/2024      5. For patients aged 21-65: Has the patient had a pap smear?  Gyn Dr Osborne had bone density      
1991     Years since quittin.0    Smokeless tobacco: Never   Substance Use Topics    Alcohol use: Yes     Alcohol/week: 12.0 standard drinks of alcohol     Types: 12 Glasses of wine per week    Drug use: No     Immunization History   Administered Date(s) Administered    COVID-19, MODERNA BLUE border, Primary or Immunocompromised, (age 12y+), IM, 100 mcg/0.5mL 10/30/2021, 2022, 2022    COVID-19, MODERNA Bivalent, (age 12y+), IM, 50 mcg/0.5 mL 2023    COVID-19, MODERNA, ( formula), (age 12y+), IM, 50mcg/0.5mL 2023    COVID-19, PFIZER PURPLE top, DILUTE for use, (age 12 y+), 30mcg/0.3mL 2021, 2021, 2021    Influenza Quadv 10/01/2018    Influenza Virus Vaccine 2009, 2012, 10/16/2014, 10/22/2015, 2017, 2018, 10/24/2019, 2020, 2021    Influenza, FLUAD, (age 65 y+), Adjuvanted, 0.5mL 2023    Influenza, FLUARIX, FLULAVAL, FLUZONE (age 6 mo+) AND AFLURIA, (age 3 y+), PF, 0.5mL 10/24/2019, 2020    Influenza, FLUCELVAX, (age 6 mo+), MDCK, PF, 0.5mL 10/16/2018, 10/07/2022    Pneumococcal, PPSV23, PNEUMOVAX 23, (age 2y+), SC/IM, 0.5mL 2018, 2024    RSV, AREXVY, (age 60y+), PF, IM, 0.5mL 2023    Td vaccine (adult) 2012    Zoster Recombinant (Shingrix) 2019, 10/17/2019     Family History   Problem Relation Age of Onset    Lung Disease Mother         COPD    Delayed Awakening Mother         No dx; possible    Anesth Problems Mother         DELAYED AWAKENING    COPD Mother         smoker    Colon Polyps Mother 65    Osteoporosis Mother     Arthritis Mother     Vision Loss Mother         detached retina;macular degeneration    Stroke Father         TIA    Dementia Father         Alzheimer's    Heart Disease Father         PVD    Alcohol Abuse Father     Thyroid Disease Sister         hypothyroidism    Diabetes Maternal Grandmother     Lung Cancer Maternal Grandfather     Cancer Maternal Grandfather

## 2024-04-29 NOTE — PATIENT INSTRUCTIONS
day. Make sure you take aspirin and not another kind of pain reliever, such as acetaminophen (Tylenol).   When should you call for help?   Call 911 if you have symptoms of a heart attack. These may include:    Chest pain or pressure, or a strange feeling in the chest.     Sweating.     Shortness of breath.     Pain, pressure, or a strange feeling in the back, neck, jaw, or upper belly or in one or both shoulders or arms.     Lightheadedness or sudden weakness.     A fast or irregular heartbeat.   After you call 911, the  may tell you to chew 1 adult-strength or 2 to 4 low-dose aspirin. Wait for an ambulance. Do not try to drive yourself.  Watch closely for changes in your health, and be sure to contact your doctor if you have any problems.  Where can you learn more?  Go to https://www.[employer].net/patientEd and enter F075 to learn more about \"A Healthy Heart: Care Instructions.\"  Current as of: June 24, 2023               Content Version: 14.0  © 8243-0014 [employer].   Care instructions adapted under license by [employer]. If you have questions about a medical condition or this instruction, always ask your healthcare professional. [employer] disclaims any warranty or liability for your use of this information.      Personalized Preventive Plan for Staci García - 4/29/2024  Medicare offers a range of preventive health benefits. Some of the tests and screenings are paid in full while other may be subject to a deductible, co-insurance, and/or copay.    Some of these benefits include a comprehensive review of your medical history including lifestyle, illnesses that may run in your family, and various assessments and screenings as appropriate.    After reviewing your medical record and screening and assessments performed today your provider may have ordered immunizations, labs, imaging, and/or referrals for you.  A list of these orders (if applicable) as well as your Preventive

## 2024-04-29 NOTE — PATIENT INSTRUCTIONS
If symptoms worsens or fail to improve follow-up with PCP or ER.     Thank you for visiting [facility] today.    -Increase fluid intake.  Some people say cranberry juice helps with discomfort.  -Don't hold your urine.  Urinate when you feel like you need to.  -Wipe from front to back after bowel movements.  -If sexually active, urinate after intercourse and use enough lubrication.   -Avoid taking bubble baths.  -Wear loose fitting clothing.  -Decrease caffeine or carbonated drinks  -Repeat urine screen in two weeks  -Take antibiotic with food and consider probiotic    Follow up with your PCP if symptoms persist or worsen.    Please go to the Emergency Department immediately for symptoms of a urinary tract infection along with any of the following:  fever with severe and sudden shaking (rigors), nausea, vomiting and the inability to keep down clear fluids or medications.

## 2024-04-29 NOTE — PROGRESS NOTES
Staci García (:  1960) is a 63 y.o. female,New patient, here for evaluation of the following chief complaint(s):  Urinary Tract Infection (suspected UTI x3 days; better today; but on Saturday and a little yesterday, it burned w/ pressure when she was at the end of her urination. /)      Assessment & Plan :    Below is the assessment and plan developed based on review of history, physical exam, and  labs.   1. Urgency of urination   Appears well, in no apparent distress.  VSS, afebrile, SPO2 97% on room air.  The abdomen is soft without tenderness, guarding, mass, rebound or organomegaly. No CVA tenderness or inguinal adenopathy noted. Urine dipstick shows positive for RBC's. No distress noted. Advise if symptoms fail to improve or worsens to follow-up with PCP or ER. Patient verbalized understanding, no questions or concerns at this time.     - AMB POC URINALYSIS DIP STICK MANUAL W/O MICRO  - Culture, Urine; Future    Results for orders placed or performed in visit on 24   AMB POC URINALYSIS DIP STICK MANUAL W/O MICRO   Result Value Ref Range    Color (UA POC) Light Yellow     Clarity (UA POC) Clear     Glucose, Urine, POC Negative     Bilirubin, Urine, POC Negative     Ketones, Urine, POC Negative     Specific Gravity, Urine, POC 1.010 1.001 - 1.035    Blood (UA POC) Trace     pH, Urine, POC 6.0 4.6 - 8.0    Protein, Urine, POC Negative     Urobilinogen, POC 0.2 mg/dL     Nitrite, Urine, POC Negative     Leukocyte Esterase, Urine, POC Negative      PLAN: Treatment per orders - also push fluids, may use Pyridium OTC prn.    1. Handout given with care instructions.     2. OTC for symptom management. Increase fluid intake.     3. Follow-up with PCP as needed.     4. Go to ED with development of any acute symptoms.        Follow-up   Follow-up with PCP or ER immediately for any new worsening or changes or if symptoms are not improving over the next 5-7 days.        Subjective :    Urinary Tract

## 2024-04-30 ENCOUNTER — PATIENT MESSAGE (OUTPATIENT)
Age: 64
End: 2024-04-30

## 2024-04-30 DIAGNOSIS — E11.9 DIABETES MELLITUS TYPE 2, NONINSULIN DEPENDENT (HCC): ICD-10-CM

## 2024-04-30 RX ORDER — MAGNESIUM GLYCINATE 100 MG
240 CAPSULE ORAL DAILY
COMMUNITY
Start: 2024-04-30

## 2024-04-30 NOTE — TELEPHONE ENCOUNTER
From: Staci García  To: Dr. Danya Garrett  Sent: 4/30/2024 12:35 PM EDT  Subject: Dosage info for records    MJ,   At my appointment on 4/29, I gave you a new supplement, magnesium glycinate. For my records, the dosage is 2 capsules for a total of 240 mg. Brand is Natures’s Bounty, High Absorption.     Thanks,   Staci

## 2024-05-01 LAB
BACTERIA SPEC CULT: NORMAL
SERVICE CMNT-IMP: NORMAL

## 2024-05-02 RX ORDER — DULAGLUTIDE 0.75 MG/.5ML
INJECTION, SOLUTION SUBCUTANEOUS
Qty: 6 ML | Refills: 1 | Status: SHIPPED | OUTPATIENT
Start: 2024-05-02

## 2024-05-03 DIAGNOSIS — E11.9 TYPE 2 DIABETES MELLITUS WITHOUT COMPLICATIONS (HCC): ICD-10-CM

## 2024-05-05 RX ORDER — METFORMIN HYDROCHLORIDE 500 MG/1
1000 TABLET, EXTENDED RELEASE ORAL
Qty: 180 TABLET | Refills: 1 | Status: SHIPPED | OUTPATIENT
Start: 2024-05-05

## 2024-05-06 ENCOUNTER — HOSPITAL ENCOUNTER (OUTPATIENT)
Facility: HOSPITAL | Age: 64
Discharge: HOME | End: 2024-05-09

## 2024-05-06 VITALS
DIASTOLIC BLOOD PRESSURE: 65 MMHG | BODY MASS INDEX: 30.53 KG/M2 | HEART RATE: 73 BPM | WEIGHT: 190 LBS | HEIGHT: 66 IN | SYSTOLIC BLOOD PRESSURE: 112 MMHG

## 2024-05-06 ASSESSMENT — PAIN SCALES - GENERAL: PAINLEVEL_OUTOF10: 0

## 2024-05-06 NOTE — PROGRESS NOTES
[facility] [place] at [facility]  [facility]    Radiation Oncology Follow Up    Staci García  [id number]  1960     Diagnosis   Diagnosis: Z17.0 - Estrogen receptor positive status [ER+], Diagnosed 2022 (Active)   C50.212 - Malignant neoplasm of upper-inner quadrant of left female breast, Diagnosed 2022 (Active) Stage IA, T1c, pN0, M0, G1, HER2 Neg, ER Pos, MA P   AJCC Staging has been reviewed.    Treatment Summary: Course: C1  Treatment Site Ref. ID Energy Dose/Fx (cGy) #Fx Dose Correction (cGy) Total Dose (cGy) Start Date End Date Elapsed Days   Lt brst 6x Lt breast 6X 266 16 / 16 0 4,256 5/18/2022 6/9/2022 22   Lt cavity cd Lt cavity cd 20E 250 4 / 4 0 1,000 6/10/2022 6/15/2022 5       Interval History   Ms. García arrives today for followup for left breast pT1 cN0 E+P+H- grade 1 IDC s/p lumpectomy 4/14/22 with 17mm grade 1 IDC with negative margins, negative nodes. Low risk mammaprint, to do AI, no chemo.    She completed 5256cGy to the left breast on 6/15/22 and is doing well on Arimidex. She does have some left shoulder tightness and decreased ROM. It does not affect her ability to function, but it is noticeable compared to her right shoulder. She saw ortho, and it is not a rotator cuff injury, so she will work on stretches to see if this helps.    Review of Systems:  A complete review of systems was obtained and negative except as described above.    Interval Imaging/Labs     Above imaging/lab reports were reviewed.  Allergies and Medications     Allergies   Allergen Reactions    Empagliflozin Other (See Comments)     Recurrent UTI's and yeast infections    Succinylcholine Other (See Comments)     Prolonged period of recovery following anesthesia    Glipizide Other (See Comments)     Frequent hypoglycemia    Phentermine Other (See Comments)     Dizziness and \"crashes\"       Current Outpatient Medications   Medication Instructions    anastrozole (ARIMIDEX) 1 mg, Oral, DAILY

## 2024-05-30 ENCOUNTER — OFFICE VISIT (OUTPATIENT)
Age: 64
End: 2024-05-30
Payer: MEDICARE

## 2024-05-30 VITALS
TEMPERATURE: 98.4 F | RESPIRATION RATE: 16 BRPM | HEART RATE: 84 BPM | HEIGHT: 66 IN | WEIGHT: 191.2 LBS | BODY MASS INDEX: 30.73 KG/M2 | DIASTOLIC BLOOD PRESSURE: 70 MMHG | SYSTOLIC BLOOD PRESSURE: 112 MMHG | OXYGEN SATURATION: 97 %

## 2024-05-30 DIAGNOSIS — E11.9 TYPE 2 DIABETES MELLITUS WITHOUT COMPLICATION, WITHOUT LONG-TERM CURRENT USE OF INSULIN (HCC): Primary | ICD-10-CM

## 2024-05-30 DIAGNOSIS — E78.2 MIXED HYPERLIPIDEMIA: ICD-10-CM

## 2024-05-30 PROCEDURE — 1090F PRES/ABSN URINE INCON ASSESS: CPT | Performed by: FAMILY MEDICINE

## 2024-05-30 PROCEDURE — 3017F COLORECTAL CA SCREEN DOC REV: CPT | Performed by: FAMILY MEDICINE

## 2024-05-30 PROCEDURE — 3044F HG A1C LEVEL LT 7.0%: CPT | Performed by: FAMILY MEDICINE

## 2024-05-30 PROCEDURE — 2022F DILAT RTA XM EVC RTNOPTHY: CPT | Performed by: FAMILY MEDICINE

## 2024-05-30 PROCEDURE — 1036F TOBACCO NON-USER: CPT | Performed by: FAMILY MEDICINE

## 2024-05-30 PROCEDURE — G8427 DOCREV CUR MEDS BY ELIG CLIN: HCPCS | Performed by: FAMILY MEDICINE

## 2024-05-30 PROCEDURE — 99214 OFFICE O/P EST MOD 30 MIN: CPT | Performed by: FAMILY MEDICINE

## 2024-05-30 PROCEDURE — G8400 PT W/DXA NO RESULTS DOC: HCPCS | Performed by: FAMILY MEDICINE

## 2024-05-30 PROCEDURE — 1123F ACP DISCUSS/DSCN MKR DOCD: CPT | Performed by: FAMILY MEDICINE

## 2024-05-30 PROCEDURE — G8417 CALC BMI ABV UP PARAM F/U: HCPCS | Performed by: FAMILY MEDICINE

## 2024-05-30 ASSESSMENT — ENCOUNTER SYMPTOMS
VOMITING: 0
DIARRHEA: 0
NAUSEA: 0
RESPIRATORY NEGATIVE: 1
ABDOMINAL PAIN: 0

## 2024-05-30 NOTE — PROGRESS NOTES
Chief Complaint   Patient presents with    Medication Check     trulicity     1. \"Have you been to the ER, urgent care clinic since your last visit?  Hospitalized since your last visit?\" [facility]    2. \"Have you seen or consulted any other health care providers outside of the [facility] since your last visit?\" Radiation oncologist Dr Libman, derm Dr Kent,     3. For patients aged 45-75: Has the patient had a colonoscopy / FIT/ Cologuard? Yes - no Care Gap present 3/2023 Dr Wilson scheduled 7/22      If the patient is female:    4. For patients aged 40-74: Has the patient had a mammogram within the past 2 years? Yes - no Care Gap present 2/2024      5. For patients aged 21-65: Has the patient had a pap smear? Yes - no Care Gap present gyn Dr MEHNAZ Osborne ~ April

## 2024-05-30 NOTE — PROGRESS NOTES
Chief Complaint   Patient presents with    3 Month Follow Up Medication Change for Diabetes     HISTORY OF PRESENT ILLNESS   HPI  Patient with h/o Type 2 NIDDM presents for 3 month follow up diabetes check and medication evaluation. At her visit w/ me 2/20/2024 she had advised me that her insurance would no longer cover the Victoza that she had been on for the past few years.  We therefore changed to trial of Trulicity 0.75 mg weekly and she is tolerating that well without reaction or side effects.  She is prone towards constipation which has increased slightly since around the end of March but she is using MiraLAX daily and that is helping.  She also states that she did not end up actually switching to the Trulicity until about 2 months ago because she used up the rest of the Victoza first.  She has not been checking her blood sugars for the past month but prior to then was checking a few times a week and getting fasting readings between 115-140, usually running around 1 12-1 20.  No signs or symptoms of hypo or hyperglycemia.  She remains compliant with her regimen of metformin  mg 2 tabs daily at supper.  She also remains compliant with her regimen of Crestor 10 mg nightly and continues to tolerate that well.   Diet: Noom Diet since 7-2021  Caffeine: 2-3 cups of half caff coffee a day, tea at lunch a few x a week, Diet Coke maybe once a month    Exercise: walks almost daily (at least 5 x a week) x 30-45 minutes  Weight: happy her wt has been gradually coming down w/ eating healthier and increasing her exercise over the years; got down from the 240 lb range in 2019 to 194-200 range now and maintaining in that range    REVIEW OF SYMPTOMS   Review of Systems   Constitutional: Negative.    Respiratory: Negative.     Cardiovascular: Negative.    Gastrointestinal:  Negative for abdominal pain, diarrhea, nausea and vomiting.   Endocrine: Negative.    Genitourinary: Negative.    Neurological: Negative.

## 2024-05-31 LAB
ANION GAP SERPL CALC-SCNC: 7 MMOL/L (ref 5–15)
BUN SERPL-MCNC: 15 MG/DL (ref 6–20)
BUN/CREAT SERPL: 18 (ref 12–20)
CALCIUM SERPL-MCNC: 9.4 MG/DL (ref 8.5–10.1)
CHLORIDE SERPL-SCNC: 107 MMOL/L (ref 97–108)
CO2 SERPL-SCNC: 27 MMOL/L (ref 21–32)
CREAT SERPL-MCNC: 0.82 MG/DL (ref 0.55–1.02)
EST. AVERAGE GLUCOSE BLD GHB EST-MCNC: 128 MG/DL
GLUCOSE SERPL-MCNC: 111 MG/DL (ref 65–100)
HBA1C MFR BLD: 6.1 % (ref 4–5.6)
POTASSIUM SERPL-SCNC: 4 MMOL/L (ref 3.5–5.1)
SODIUM SERPL-SCNC: 141 MMOL/L (ref 136–145)

## 2024-07-02 ENCOUNTER — HOSPITAL ENCOUNTER (OUTPATIENT)
Facility: HOSPITAL | Age: 64
Discharge: HOME | End: 2024-07-05
Payer: MEDICARE

## 2024-07-02 DIAGNOSIS — C50.912 INFILTRATING DUCTAL CARCINOMA OF LEFT BREAST (HCC): ICD-10-CM

## 2024-07-02 PROCEDURE — 2580000003 HC RX 258: Performed by: NURSE PRACTITIONER

## 2024-07-02 PROCEDURE — C8908 MRI W/O FOL W/CONT, BREAST,: HCPCS

## 2024-07-02 PROCEDURE — 6360000004 HC RX CONTRAST MEDICATION: Performed by: NURSE PRACTITIONER

## 2024-07-02 PROCEDURE — A9579 GAD-BASE MR CONTRAST NOS,1ML: HCPCS | Performed by: NURSE PRACTITIONER

## 2024-07-02 RX ORDER — SODIUM CHLORIDE 9 MG/ML
INJECTION, SOLUTION INTRAVENOUS ONCE
Status: COMPLETED | OUTPATIENT
Start: 2024-07-02 | End: 2024-07-02

## 2024-07-02 RX ADMIN — SODIUM CHLORIDE 100 ML: 9 INJECTION, SOLUTION INTRAVENOUS at 13:21

## 2024-07-02 RX ADMIN — GADOTERIDOL 18 ML: 279.3 INJECTION, SOLUTION INTRAVENOUS at 13:20

## 2024-07-03 DIAGNOSIS — E78.2 MIXED HYPERLIPIDEMIA: ICD-10-CM

## 2024-07-05 RX ORDER — ROSUVASTATIN CALCIUM 10 MG/1
10 TABLET, COATED ORAL NIGHTLY
Qty: 90 TABLET | Refills: 2 | Status: SHIPPED | OUTPATIENT
Start: 2024-07-05

## 2024-07-09 ENCOUNTER — OFFICE VISIT (OUTPATIENT)
Age: 64
End: 2024-07-09

## 2024-07-09 VITALS
DIASTOLIC BLOOD PRESSURE: 76 MMHG | WEIGHT: 194 LBS | RESPIRATION RATE: 18 BRPM | BODY MASS INDEX: 31.18 KG/M2 | HEIGHT: 66 IN | OXYGEN SATURATION: 97 % | HEART RATE: 78 BPM | TEMPERATURE: 98.8 F | SYSTOLIC BLOOD PRESSURE: 122 MMHG

## 2024-07-09 DIAGNOSIS — S69.92XA FINGER INJURY, LEFT, INITIAL ENCOUNTER: ICD-10-CM

## 2024-07-09 DIAGNOSIS — L03.012 PARONYCHIA OF FINGER OF LEFT HAND: Primary | ICD-10-CM

## 2024-07-09 RX ORDER — CEPHALEXIN 500 MG/1
500 CAPSULE ORAL 3 TIMES DAILY
Qty: 21 CAPSULE | Refills: 0 | Status: SHIPPED | OUTPATIENT
Start: 2024-07-09 | End: 2024-07-16

## 2024-07-09 ASSESSMENT — ENCOUNTER SYMPTOMS
GASTROINTESTINAL NEGATIVE: 1
ALLERGIC/IMMUNOLOGIC NEGATIVE: 1
EYES NEGATIVE: 1
RESPIRATORY NEGATIVE: 1

## 2024-07-09 NOTE — PROGRESS NOTES
2024   Staci García (: 1960) is a 63 y.o. female, New patient, here for evaluation of the following chief complaint(s):  Hand Injury (2 weeks ago slammed index finger in door and cut it, within the last 3 days it has been red and has begun to pus. )     ASSESSMENT/PLAN:  Below is the assessment and plan developed based on review of pertinent history, physical exam, labs, studies, and medications.  1. Paronychia of finger of left hand  -     cephALEXin (KEFLEX) 500 MG capsule; Take 1 capsule by mouth 3 times daily for 7 days, Disp-21 capsule, R-0Normal  2. Finger injury, left, initial encounter         Handout given with care instructions  2. OTC for symptom management. Increase fluid intake, ensure adequate nutritional intake.  3. Follow up with PCP as needed.  4. Go to ED with development of any acute symptoms.     Follow up:  Return if symptoms worsen or fail to improve.  Follow up immediately for any new, worsening or changes or if symptoms are not improving over the next 5-7 days.     SUBJECTIVE/OBJECTIVE:    Hand Injury          Diagnoses and all orders for this visit:  Paronychia of finger of left hand  Finger injury, left, initial encounter  Hand Injury (2 weeks ago slammed index finger in door and cut it, within the last 3 days it has been red and has begun to pus. )          is a 63 y.o. female who notes recent onset of a skin problem.  The details are as follows:      ONSET: 2 weeks ago    LOCATION: Left index finger nailbed    TRIGGER: Patient's hand got injured with door 2 weeks ago    PREVIOUS TREATMENTS: None    DESCRIPTION: Erythema noted around nailbed, tender.  Pus has already drained.  There is dried blood underneath the nail.     Patient also noted that OTC remedies did not help.  denies fever      No results found for any visits on 24.    No results found for this visit on 24.  XR Results (most recent):  @[facility](GBO4209:1)@         Review of Systems

## 2024-07-19 RX ORDER — SODIUM CHLORIDE 9 MG/ML
25 INJECTION, SOLUTION INTRAVENOUS PRN
Status: DISCONTINUED | OUTPATIENT
Start: 2024-07-19 | End: 2024-07-22 | Stop reason: HOSPADM

## 2024-07-19 RX ORDER — SODIUM CHLORIDE 0.9 % (FLUSH) 0.9 %
5-40 SYRINGE (ML) INJECTION EVERY 12 HOURS SCHEDULED
Status: DISCONTINUED | OUTPATIENT
Start: 2024-07-19 | End: 2024-07-22 | Stop reason: HOSPADM

## 2024-07-19 RX ORDER — SODIUM CHLORIDE 9 MG/ML
INJECTION, SOLUTION INTRAVENOUS CONTINUOUS
Status: DISCONTINUED | OUTPATIENT
Start: 2024-07-19 | End: 2024-07-22 | Stop reason: HOSPADM

## 2024-07-19 RX ORDER — SODIUM CHLORIDE 0.9 % (FLUSH) 0.9 %
5-40 SYRINGE (ML) INJECTION PRN
Status: DISCONTINUED | OUTPATIENT
Start: 2024-07-19 | End: 2024-07-22 | Stop reason: HOSPADM

## 2024-07-20 ENCOUNTER — ANESTHESIA EVENT (OUTPATIENT)
Facility: HOSPITAL | Age: 64
End: 2024-07-20
Payer: MEDICARE

## 2024-07-20 NOTE — ANESTHESIA PRE PROCEDURE
mouth daily     • meclizine (ANTIVERT) 25 MG tablet Take by mouth 3 times daily as needed (Patient not taking: Reported on 7/9/2024)         Allergies:    Allergies   Allergen Reactions   • Empagliflozin Other (See Comments)     Recurrent UTI's and yeast infections   • Succinylcholine Other (See Comments)     Prolonged period of recovery following anesthesia   • Glipizide Other (See Comments)     Frequent hypoglycemia   • Phentermine Other (See Comments)     Dizziness and \"crashes\"       Problem List:    Patient Active Problem List   Diagnosis Code   • Left foot pain M79.672   • Diabetes mellitus type 2, noninsulin dependent (HCC) E11.9   • Bone spur of foot M77.50   • Tendonitis, Achilles, left M76.62   • Infiltrating ductal carcinoma of left breast ([facility]) C50.912   • Gastritis K29.70   • Postmenopause Z78.0   • Right knee pain M25.561   • Uterine fibroid D25.9   • Vitamin D deficiency E55.9   • Mixed hyperlipidemia E78.2   • Hiatal hernia with GERD and esophagitis K44.9, K21.00   • Left sided sciatica M54.32   • Multinodular goiter E04.2   • FH: melanoma Z80.8   • History of SCC (squamous cell carcinoma) of skin Z85.828   • Tendonitis, Achilles, right M76.61   • Heel spur, left M77.32   • GERD (gastroesophageal reflux disease) K21.9   • History of lumpectomy of left breast Z98.890   • Stress incontinence N39.3   • Meniere's disease H81.09   • Long term (current) use of aromatase inhibitors Z79.811   • Use of anastrozole (Arimidex) Z79.811   • Collar bone pain M89.8X1   • High cholesterol E78.00   • Type 2 diabetes mellitus without complication, without long-term current use of insulin ([facility]) E11.9   • Scattered fibroglandular tissue density of both breasts on mammography R92.323       Past Medical History:        Diagnosis Date   • Arthritis Feb 2023   • Bone spur of ankle 2018   • Bone spur of foot 02/24/2017    Podiatry Dr Grewal   • BRCA1 negative    • BRCA2 negative    • Breast cancer ([facility]) 2022    Left   •

## 2024-07-22 ENCOUNTER — HOSPITAL ENCOUNTER (OUTPATIENT)
Facility: HOSPITAL | Age: 64
Setting detail: OUTPATIENT SURGERY
Discharge: HOME | End: 2024-07-22
Attending: STUDENT IN AN ORGANIZED HEALTH CARE EDUCATION/TRAINING PROGRAM | Admitting: STUDENT IN AN ORGANIZED HEALTH CARE EDUCATION/TRAINING PROGRAM
Payer: MEDICARE

## 2024-07-22 ENCOUNTER — ANESTHESIA (OUTPATIENT)
Facility: HOSPITAL | Age: 64
End: 2024-07-22
Payer: MEDICARE

## 2024-07-22 VITALS
HEIGHT: 66 IN | SYSTOLIC BLOOD PRESSURE: 148 MMHG | TEMPERATURE: 97.7 F | HEART RATE: 71 BPM | RESPIRATION RATE: 14 BRPM | WEIGHT: 192.3 LBS | BODY MASS INDEX: 30.91 KG/M2 | OXYGEN SATURATION: 99 % | DIASTOLIC BLOOD PRESSURE: 70 MMHG

## 2024-07-22 PROCEDURE — 3600007502: Performed by: STUDENT IN AN ORGANIZED HEALTH CARE EDUCATION/TRAINING PROGRAM

## 2024-07-22 PROCEDURE — 3700000001 HC ADD 15 MINUTES (ANESTHESIA): Performed by: STUDENT IN AN ORGANIZED HEALTH CARE EDUCATION/TRAINING PROGRAM

## 2024-07-22 PROCEDURE — 2580000003 HC RX 258: Performed by: STUDENT IN AN ORGANIZED HEALTH CARE EDUCATION/TRAINING PROGRAM

## 2024-07-22 PROCEDURE — 3600007512: Performed by: STUDENT IN AN ORGANIZED HEALTH CARE EDUCATION/TRAINING PROGRAM

## 2024-07-22 PROCEDURE — 3700000000 HC ANESTHESIA ATTENDED CARE: Performed by: STUDENT IN AN ORGANIZED HEALTH CARE EDUCATION/TRAINING PROGRAM

## 2024-07-22 PROCEDURE — 6360000002 HC RX W HCPCS

## 2024-07-22 PROCEDURE — 7100000011 HC PHASE II RECOVERY - ADDTL 15 MIN: Performed by: STUDENT IN AN ORGANIZED HEALTH CARE EDUCATION/TRAINING PROGRAM

## 2024-07-22 PROCEDURE — 2500000003 HC RX 250 WO HCPCS

## 2024-07-22 PROCEDURE — 2580000003 HC RX 258

## 2024-07-22 PROCEDURE — 7100000010 HC PHASE II RECOVERY - FIRST 15 MIN: Performed by: STUDENT IN AN ORGANIZED HEALTH CARE EDUCATION/TRAINING PROGRAM

## 2024-07-22 RX ORDER — SODIUM CHLORIDE 9 MG/ML
INJECTION, SOLUTION INTRAVENOUS CONTINUOUS PRN
Status: COMPLETED | OUTPATIENT
Start: 2024-07-22 | End: 2024-07-22

## 2024-07-22 RX ORDER — 0.9 % SODIUM CHLORIDE 0.9 %
INTRAVENOUS SOLUTION INTRAVENOUS PRN
Status: DISCONTINUED | OUTPATIENT
Start: 2024-07-22 | End: 2024-07-22 | Stop reason: SDUPTHER

## 2024-07-22 RX ORDER — LIDOCAINE HYDROCHLORIDE 20 MG/ML
INJECTION, SOLUTION EPIDURAL; INFILTRATION; INTRACAUDAL; PERINEURAL PRN
Status: DISCONTINUED | OUTPATIENT
Start: 2024-07-22 | End: 2024-07-22 | Stop reason: SDUPTHER

## 2024-07-22 RX ADMIN — SODIUM CHLORIDE 500 ML: 900 INJECTION, SOLUTION INTRAVENOUS at 08:58

## 2024-07-22 RX ADMIN — LIDOCAINE HYDROCHLORIDE 50 MG: 20 INJECTION, SOLUTION EPIDURAL; INFILTRATION; INTRACAUDAL; PERINEURAL at 08:42

## 2024-07-22 RX ADMIN — PROPOFOL 250 MG: 10 INJECTION, EMULSION INTRAVENOUS at 08:58

## 2024-07-22 NOTE — ANESTHESIA POSTPROCEDURE EVALUATION
Department of Anesthesiology  Postprocedure Note    Patient: tSaci García  MRN: [id number]  YOB: 1960  Date of evaluation: 7/22/2024    Procedure Summary       Date: 07/22/24 Room / Location: [facility] ENDO 03 / [facility] ENDOSCOPY    Anesthesia Start: 0842 Anesthesia Stop: 0859    Procedure: COLONOSCOPY Diagnosis:       Hx of colonic polyps      (Hx of colonic polyps [Z86.010])    Surgeons: Nga Espinal DO Responsible Provider: Colten Silver MD    Anesthesia Type: MAC ASA Status: 2            Anesthesia Type: No value filed.    Edmond Phase I: Edmond Score: 10    Edmond Phase II: Edmond Score: 10    Anesthesia Post Evaluation    Patient location during evaluation: bedside  Patient participation: complete - patient participated  Level of consciousness: awake  Pain score: 0  Airway patency: patent  Nausea & Vomiting: no nausea and no vomiting  Cardiovascular status: hemodynamically stable  Respiratory status: acceptable  Hydration status: euvolemic  Pain management: adequate    No notable events documented.

## 2024-07-22 NOTE — H&P
[facility]  ALICE Nance Kylie RAMIREZ  [phone]          Pre-procedure History and Physical       NAME:  Staci García   :   1960   MRN:   [id number]     CHIEF COMPLAINT/HPI: personal hx of colon polyps    63 y.o. female here for colonoscopy for hx of colon polyps.  Last colonoscopy: - piecemeal resection of large polyp adjacent to a diverticulum. Here for repeat.     PMH:  Past Medical History:   Diagnosis Date    Arthritis 2023    Bone spur of ankle 2018    Bone spur of foot 2017    Podiatry Dr Grewal    BRCA1 negative     BRCA2 negative     Breast cancer (HCC)     Left    Diabetes mellitus type 2, noninsulin dependent (HCC) 12/10/2015    3/2001    Diverticulosis age 30    FH: melanoma     also FH: BCC    Gastritis 2010    Gestational diabetes 1998    Heel spur, left 2018, Dr Bonilla    Hemorrhoids     Hiatal hernia with GERD and esophagitis 2020    EGD 2016    History of abnormal Pap smear 2012    History of SCC (squamous cell carcinoma) of skin 2019    Skin checks and cancer screenings per [facility] Dr. Shanthi Kent    History of therapeutic radiation     Left foot pain 2010    Left sided sciatica 2012    Meniere's disease     ENT     Mixed hyperlipidemia 2020    Multinodular goiter 2022 Endo Dr. Noe Herrera    Postmenopause 2012    Radiation therapy complication     Right knee pain 2010    S/P colonoscopy with polypectomy 2010    Skin cancer squamous    Stress incontinence 2012    Tendonitis, Achilles, left 2019    CSI Dr Bonilla     Tendonitis, Achilles, right 2018: Dr Bonilla, bone spur chipped    Uterine fibroid     Vitamin D deficiency 2013        PSH:  Past Surgical History:   Procedure Laterality Date    BREAST BIOPSY Right 2014    Benign, Elba de Leonard    BREAST LUMPECTOMY Left

## 2024-07-22 NOTE — PROGRESS NOTES
ARRIVAL INFORMATION:  Verified patient name and date of birth, scheduled procedure, and informed consent.     : Carlo[facility] () contact number: [phone]  Physician and staff can share information with the .     Belongings with patient include:  Clothing,Glasses    GI FOCUSED ASSESSMENT:  Neuro: Awake, alert, oriented x4  Respiratory: even and unlabored   GI: soft and non-distended  EKG Rhythm: normal sinus rhythm    Education:Reviewed general discharge instructions and  information.

## 2024-07-22 NOTE — DISCHARGE SUMMARY
[facility]  ALICE Espinal D.O.  [phone]            COLONOSCOPY DISCHARGE INSTRUCTIONS    Staci García  [id number]  1960    DISCOMFORT:  Redness at IV site- apply warm compress to area; if redness or soreness persist- contact your physician  There may be a slight amount of blood passed from the rectum  Gaseous discomfort- walking, belching will help relieve any discomfort  You may not operate a vehicle for 12 hours  You may not engage in an occupation involving machinery or appliances for the  rest of today  You may not drink alcoholic beverages for at least 12 hours  Avoid making any critical decisions for at least 24 hours    DIET:   You may resume your normal diet, but some patients find that heavy or large meals may lead to indigestion or vomiting. I suggest a light meal as first food intake.    ACTIVITY:  You may resume your normal daily activities.  It is recommended that you spend the remainder of the day resting - avoid any strenuous activity.    CALL M.DHortencia IF ANY SIGN OF:   Increasing pain, nausea, vomiting  Abdominal distension (swelling)  Significant bleeding (oral or rectal)  Fever   Pain in chest area  Shortness of breath    Additional Instructions:   Call Dr. Espinal if any questions or problems at [phone]   You should receive the biopsy results by phone or mail within 3 weeks, if not, call  my office for the results      Colonoscopy showed diverticulosis, but otherwise normal. NO residual/recurrent polyp from the one that was removed last year. This is good news!   Should have a repeat colonoscopy in 3 years.    Resume regular medications    Follow-up at your convenience.      ALICE Espinal D.O.  [facility]

## 2024-07-22 NOTE — OP NOTE
Tattoo seen in distal transverse colon. No residual polyp seen  L-sided diverticulosis.  Otherwise, unremarkable.   Second look/retroflexion performed in right colon.    Recommendations:   - Resume regular diet  - Resume normal medications. Avoid NSAID medications for 48 hours. Avoid any blood thinners for 48 hours such as eliquis, xarelto, etc.   - Repeat colonoscopy in 3 years  - Follow-up with your primary care provider         Discharge Disposition:  Home in the company of a  when able to ambulate.      ALICE Espinal D.O.  [facility]

## 2024-07-22 NOTE — DISCHARGE INSTRUCTIONS
[facility]  ALICE Espinal D.O.  [phone]            COLONOSCOPY DISCHARGE INSTRUCTIONS    Staci García  [id number]  1960    DISCOMFORT:  Redness at IV site- apply warm compress to area; if redness or soreness persist- contact your physician  There may be a slight amount of blood passed from the rectum  Gaseous discomfort- walking, belching will help relieve any discomfort  You may not operate a vehicle for 12 hours  You may not engage in an occupation involving machinery or appliances for the  rest of today  You may not drink alcoholic beverages for at least 12 hours  Avoid making any critical decisions for at least 24 hours    DIET:   You may resume your normal diet, but some patients find that heavy or large meals may lead to indigestion or vomiting. I suggest a light meal as first food intake.    ACTIVITY:  You may resume your normal daily activities.  It is recommended that you spend the remainder of the day resting - avoid any strenuous activity.    CALL M.D. IF ANY SIGN OF:   Increasing pain, nausea, vomiting  Abdominal distension (swelling)  Significant bleeding (oral or rectal)  Fever   Pain in chest area  Shortness of breath    Additional Instructions:   Call Dr. Espinal if any questions or problems at [phone]   You should receive the biopsy results by phone or mail within 3 weeks, if not, call  my office for the results      Colonoscopy showed diverticulosis, but otherwise normal. NO residual/recurrent polyp from the one that was removed last year. This is good news!   Should have a repeat colonoscopy in 3 years.    Resume regular medications    Follow-up at your convenience.      ALICE Espinal D.O.  [facility]  Patient Education on Sedation / Analgesia Administered for Procedure      For 24 hours after general anesthesia or intravenous analgesia / sedation:  Have someone responsible help you with your care  Limit your

## 2024-08-08 ENCOUNTER — OFFICE VISIT (OUTPATIENT)
Age: 64
End: 2024-08-08
Payer: MEDICARE

## 2024-08-08 VITALS
SYSTOLIC BLOOD PRESSURE: 104 MMHG | RESPIRATION RATE: 18 BRPM | WEIGHT: 189 LBS | TEMPERATURE: 98 F | OXYGEN SATURATION: 97 % | BODY MASS INDEX: 30.51 KG/M2 | HEART RATE: 73 BPM | DIASTOLIC BLOOD PRESSURE: 65 MMHG

## 2024-08-08 DIAGNOSIS — Z98.890 HISTORY OF LUMPECTOMY OF LEFT BREAST: ICD-10-CM

## 2024-08-08 DIAGNOSIS — Z85.3 PERSONAL HISTORY OF BREAST CANCER: ICD-10-CM

## 2024-08-08 DIAGNOSIS — C50.912 INFILTRATING DUCTAL CARCINOMA OF LEFT BREAST (HCC): Primary | ICD-10-CM

## 2024-08-08 DIAGNOSIS — Z79.811 USE OF ANASTROZOLE (ARIMIDEX): ICD-10-CM

## 2024-08-08 PROCEDURE — 99213 OFFICE O/P EST LOW 20 MIN: CPT | Performed by: INTERNAL MEDICINE

## 2024-08-08 PROCEDURE — 1036F TOBACCO NON-USER: CPT | Performed by: INTERNAL MEDICINE

## 2024-08-08 PROCEDURE — G8417 CALC BMI ABV UP PARAM F/U: HCPCS | Performed by: INTERNAL MEDICINE

## 2024-08-08 PROCEDURE — 3017F COLORECTAL CA SCREEN DOC REV: CPT | Performed by: INTERNAL MEDICINE

## 2024-08-08 PROCEDURE — G8400 PT W/DXA NO RESULTS DOC: HCPCS | Performed by: INTERNAL MEDICINE

## 2024-08-08 PROCEDURE — G8427 DOCREV CUR MEDS BY ELIG CLIN: HCPCS | Performed by: INTERNAL MEDICINE

## 2024-08-08 PROCEDURE — 1090F PRES/ABSN URINE INCON ASSESS: CPT | Performed by: INTERNAL MEDICINE

## 2024-08-08 PROCEDURE — 1123F ACP DISCUSS/DSCN MKR DOCD: CPT | Performed by: INTERNAL MEDICINE

## 2024-08-08 NOTE — PROGRESS NOTES
Staci García is a 63 y.o. female    Chief Complaint   Patient presents with    Follow-up     Left Breast Cancer     1. Have you been to the ER, urgent care clinic since your last visit?  Hospitalized since your last visit? Yes, [facility] on 7/9/24    2. Have you seen or consulted any other health care providers outside of the [facility] since your last visit?  Include any pap smears or colon screening. [facility]      
Hemorrhoids     Hiatal hernia with GERD and esophagitis 2020    EGD 2016    History of abnormal Pap smear 2012    History of SCC (squamous cell carcinoma) of skin 2019    Skin checks and cancer screenings per [facility] Dr. Shanthi Kent    History of therapeutic radiation     Left foot pain 2010    Left sided sciatica 2012    Meniere's disease     ENT     Mixed hyperlipidemia 2020    Multinodular goiter 2022 Endo Dr. Noe Herrera    Postmenopause 2012    Radiation therapy complication     Right knee pain 2010    S/P colonoscopy with polypectomy 2010    Skin cancer squamous    Stress incontinence 2012    Tendonitis, Achilles, left 2019    CSI Dr Bonilla     Tendonitis, Achilles, right 2018: Dr Bonilla, bone spur chipped    Uterine fibroid     Vitamin D deficiency 2013       Past Surgical History:   Procedure Laterality Date    BREAST BIOPSY Right 2014    Benign, Elba de Leonard    BREAST LUMPECTOMY Left 2022    RE EXCISION LATERAL MARGIN LEFT BREAST LUMPECTOMY performed by Get Varner Jr., MD at [facility] AMBULATORY OR    BREAST LUMPECTOMY Left 2022    LEFT BREAST LUMPECTOMY WITH MAGSEED, LEFT BREAST SENTINEL NODE BIOPSY (PSEUDOCHOLINESTERASE DEFICIENCY) performed by Get Varner Jr., MD at [facility] AMBULATORY OR    BREAST LUMPECTOMY      benign fibroadenoma right breast     SECTION      Emergency for fetal distress    CHOLECYSTECTOMY, LAPAROSCOPIC  1998    gallstones    COLONOSCOPY  2016    Polyps, Multiple Diverticulae, Internal Hemorrhoids; f/u 5 yrs, Dr Malagon    COLONOSCOPY          COLONOSCOPY  2023    polyps x2, repeat 1 years per Dr Espinal @ [facility]    COLONOSCOPY  2021    Diverticulosis, incomplete study due to poor prep, repeat 1 yr    COLONOSCOPY N/A 2024    COLONOSCOPY performed by Nga Espinal DO at [facility] ENDOSCOPY

## 2024-08-29 ENCOUNTER — PATIENT MESSAGE (OUTPATIENT)
Age: 64
End: 2024-08-29

## 2024-09-04 RX ORDER — SCOLOPAMINE TRANSDERMAL SYSTEM 1 MG/1
1 PATCH, EXTENDED RELEASE TRANSDERMAL
Qty: 10 PATCH | Refills: 0 | Status: SHIPPED | OUTPATIENT
Start: 2024-09-04

## 2024-10-07 DIAGNOSIS — E11.9 DIABETES MELLITUS TYPE 2, NONINSULIN DEPENDENT (HCC): ICD-10-CM

## 2024-10-07 RX ORDER — DULAGLUTIDE 0.75 MG/.5ML
INJECTION, SOLUTION SUBCUTANEOUS
Qty: 6 ML | Refills: 1 | Status: SHIPPED | OUTPATIENT
Start: 2024-10-07

## 2024-10-07 NOTE — TELEPHONE ENCOUNTER
PCP: Danya Garrett MD    Last appt: 5/30/2024     Future Appointments   Date Time Provider Department Center   11/14/2024 10:00 AM Peg Thomas APRN - NP [facility] BS AMB   2/4/2025 10:20 AM Danya Garrett MD PAFP [facility] ECC DEP   2/13/2025 10:25 AM [facility] 5 [facility] SM   2/24/2025 10:00 AM Kira Cotter, DO [facility]       Requested Prescriptions     Pending Prescriptions Disp Refills    TRULICITY 0.75 MG/0.5ML SOPN SC injection [Pharmacy Med Name: TRULICITY SD PEN 0.5ML 4'S 0.75MG 0.75MG] 6 mL 3     Sig: INJECT 0.75 MG UNDER THE SKIN ONCE A WEEK       Prior labs and Blood pressures:  BP Readings from Last 3 Encounters:   08/08/24 104/65   07/22/24 (!) 148/70   07/09/24 122/76     Lab Results   Component Value Date/Time     05/30/2024 01:55 PM    K 4.0 05/30/2024 01:55 PM     05/30/2024 01:55 PM    CO2 27 05/30/2024 01:55 PM    BUN 15 05/30/2024 01:55 PM    GFRAA >60 09/20/2022 08:31 AM     Lab Results   Component Value Date/Time    MSX0ZDQI 6.0 03/08/2022 08:55 AM     Lab Results   Component Value Date/Time    CHOL 154 02/20/2024 10:16 AM    HDL 70 02/20/2024 10:16 AM    LDL 58.6 02/20/2024 10:16 AM    VLDL 25.4 02/20/2024 10:16 AM     No results found for: \"VITD3\"    Lab Results   Component Value Date/Time    TSH 0.37 02/20/2024 10:16 AM

## 2024-10-28 DIAGNOSIS — E11.9 TYPE 2 DIABETES MELLITUS WITHOUT COMPLICATIONS (HCC): ICD-10-CM

## 2024-10-28 RX ORDER — METFORMIN HYDROCHLORIDE 500 MG/1
TABLET, EXTENDED RELEASE ORAL
Qty: 180 TABLET | Refills: 0 | Status: SHIPPED | OUTPATIENT
Start: 2024-10-28

## 2024-11-05 ENCOUNTER — OFFICE VISIT (OUTPATIENT)
Age: 64
End: 2024-11-05
Payer: MEDICARE

## 2024-11-05 VITALS — BODY MASS INDEX: 30.37 KG/M2 | HEIGHT: 66 IN | WEIGHT: 189 LBS

## 2024-11-05 DIAGNOSIS — Z98.890 S/P LUMPECTOMY OF BREAST: ICD-10-CM

## 2024-11-05 DIAGNOSIS — Z85.3 HISTORY OF BREAST CANCER IN FEMALE: ICD-10-CM

## 2024-11-05 DIAGNOSIS — Z17.0 MALIGNANT NEOPLASM OF LEFT BREAST IN FEMALE, ESTROGEN RECEPTOR POSITIVE, UNSPECIFIED SITE OF BREAST (HCC): Primary | ICD-10-CM

## 2024-11-05 DIAGNOSIS — Z92.3 S/P RADIATION THERAPY: ICD-10-CM

## 2024-11-05 DIAGNOSIS — C50.912 MALIGNANT NEOPLASM OF LEFT BREAST IN FEMALE, ESTROGEN RECEPTOR POSITIVE, UNSPECIFIED SITE OF BREAST (HCC): Primary | ICD-10-CM

## 2024-11-05 PROCEDURE — 99213 OFFICE O/P EST LOW 20 MIN: CPT | Performed by: NURSE PRACTITIONER

## 2024-11-05 PROCEDURE — G8484 FLU IMMUNIZE NO ADMIN: HCPCS | Performed by: NURSE PRACTITIONER

## 2024-11-05 PROCEDURE — 1123F ACP DISCUSS/DSCN MKR DOCD: CPT | Performed by: NURSE PRACTITIONER

## 2024-11-05 PROCEDURE — 1036F TOBACCO NON-USER: CPT | Performed by: NURSE PRACTITIONER

## 2024-11-05 PROCEDURE — 3017F COLORECTAL CA SCREEN DOC REV: CPT | Performed by: NURSE PRACTITIONER

## 2024-11-05 PROCEDURE — G8417 CALC BMI ABV UP PARAM F/U: HCPCS | Performed by: NURSE PRACTITIONER

## 2024-11-05 PROCEDURE — 1160F RVW MEDS BY RX/DR IN RCRD: CPT | Performed by: NURSE PRACTITIONER

## 2024-11-05 PROCEDURE — G8427 DOCREV CUR MEDS BY ELIG CLIN: HCPCS | Performed by: NURSE PRACTITIONER

## 2024-11-05 PROCEDURE — 1159F MED LIST DOCD IN RCRD: CPT | Performed by: NURSE PRACTITIONER

## 2024-11-05 PROCEDURE — G8400 PT W/DXA NO RESULTS DOC: HCPCS | Performed by: NURSE PRACTITIONER

## 2024-11-05 PROCEDURE — 1090F PRES/ABSN URINE INCON ASSESS: CPT | Performed by: NURSE PRACTITIONER

## 2024-11-05 NOTE — PROGRESS NOTES
HISTORY OF PRESENT ILLNESS  Staci García is a 64 y.o. female     HPI  Established patient for follow-up of LEFT breast cancer. Denies breast mass, skin changes, nipple discharge and pain.             Breast history -  Referring - Danya Garrett MD  22: LEFT breast bx. PATH: IDC, largest focus measures 10mm, low grade, ER+(99%)/IA+(45%)/HER2-, Ki-67 15%.   Clinical stage 1.  MammaPrint: Low-risk, luminal A-type, +0.208.  22: LEFT breast lumpectomy and SLNBx. - Dr. Varner  PATH: IDC with lobular features, 17mm, grade 1, ER+(99%)/IA+(45%)/HER2-, Ki-67 15%, positive margins, 4 benign LN (0/4). LCIS present.  Pathologic stage: pT1c, pN0.   22: LEFT breast lumpectomy re-excision of lateral margin. PATH: Benign breast tissue with extensive fat necrosis and focal usual duct hyperplasia. No evidence of carcinoma.  2022 - completed XR Dr. Morales  2022 - started on anastrozole - Dr. Cotter            Family history -   No family history of breast or ovarian cancer      OB History               Para        Term                AB        Living             SAB        IAB        Ectopic        Molar        Multiple        Live Births   3          Obstetric Comments      Menarche 8, LMP , # of children 3, age of 1st delivery 32, Hysterectomy/oophorectomy No/No, Breast bx Yes, history of breast feeding  No, BCP Yes, Hormone therapy No      Emergency  x 1; vaginal deliveries x 2; Previous Gyn: Staci olmstead at a  [facility]; new Gyn Dr Juan Francisco Johnston (retired), changing to new Gyn at [facility] ; Dr. Madonna Osborne                   Past Surgical History:   Procedure Laterality Date    BREAST BIOPSY Right 2014    Benign, Elba Werner    BREAST LUMPECTOMY Left 2022    RE EXCISION LATERAL MARGIN LEFT BREAST LUMPECTOMY performed by Get Varner Jr., MD at [facility] AMBULATORY OR    BREAST LUMPECTOMY Left 2022    LEFT BREAST LUMPECTOMY WITH

## 2024-11-22 NOTE — PROGRESS NOTES
11/19/2019      PATIENT:  Andreea Daniel  PARENT:     To Whom it May Concern:    This is to certify that this parent is to be excused from work today due to illness child's medical appointment.    Andreea Daniel was seen on 11/19/2019.  Please excuse   from work today.     Thank you,         SIGNATURE:___________________________________________________________                                                   Shantel Mir MD        [facility]   [address] [address] [phone]                  Endoscopy Case End Note:    Procedure scope was pre-cleaned, per protocol, at bedside by River.      Report received from anesthesia.  See anesthesia flowsheet for intra-procedure vital signs and events.    Belongings returned to patient.     Previously Declined (within the last year)

## 2024-12-05 ENCOUNTER — TELEPHONE (OUTPATIENT)
Age: 64
End: 2024-12-05

## 2024-12-06 ENCOUNTER — TELEPHONE (OUTPATIENT)
Age: 64
End: 2024-12-06

## 2024-12-06 NOTE — TELEPHONE ENCOUNTER
Phoned pt regarding appt r/s request; advised pt provider does not go to another location and next available after 3/6 would be 3/10 at 3:15pm. Appt scheduled and confirmed.

## 2025-02-03 SDOH — ECONOMIC STABILITY: FOOD INSECURITY: WITHIN THE PAST 12 MONTHS, YOU WORRIED THAT YOUR FOOD WOULD RUN OUT BEFORE YOU GOT MONEY TO BUY MORE.: NEVER TRUE

## 2025-02-03 SDOH — ECONOMIC STABILITY: FOOD INSECURITY: WITHIN THE PAST 12 MONTHS, THE FOOD YOU BOUGHT JUST DIDN'T LAST AND YOU DIDN'T HAVE MONEY TO GET MORE.: NEVER TRUE

## 2025-02-03 SDOH — ECONOMIC STABILITY: TRANSPORTATION INSECURITY
IN THE PAST 12 MONTHS, HAS LACK OF TRANSPORTATION KEPT YOU FROM MEETINGS, WORK, OR FROM GETTING THINGS NEEDED FOR DAILY LIVING?: NO

## 2025-02-03 SDOH — ECONOMIC STABILITY: INCOME INSECURITY: IN THE LAST 12 MONTHS, WAS THERE A TIME WHEN YOU WERE NOT ABLE TO PAY THE MORTGAGE OR RENT ON TIME?: NO

## 2025-02-03 SDOH — ECONOMIC STABILITY: TRANSPORTATION INSECURITY
IN THE PAST 12 MONTHS, HAS THE LACK OF TRANSPORTATION KEPT YOU FROM MEDICAL APPOINTMENTS OR FROM GETTING MEDICATIONS?: NO

## 2025-02-04 ENCOUNTER — OFFICE VISIT (OUTPATIENT)
Age: 65
End: 2025-02-04
Payer: MEDICARE

## 2025-02-04 VITALS
DIASTOLIC BLOOD PRESSURE: 75 MMHG | HEART RATE: 78 BPM | SYSTOLIC BLOOD PRESSURE: 122 MMHG | OXYGEN SATURATION: 98 % | TEMPERATURE: 98.4 F | HEIGHT: 66 IN | RESPIRATION RATE: 16 BRPM | WEIGHT: 198 LBS | BODY MASS INDEX: 31.82 KG/M2

## 2025-02-04 DIAGNOSIS — E78.2 MIXED HYPERLIPIDEMIA: ICD-10-CM

## 2025-02-04 DIAGNOSIS — E04.2 MULTINODULAR GOITER: ICD-10-CM

## 2025-02-04 DIAGNOSIS — E11.9 TYPE 2 DIABETES MELLITUS WITHOUT COMPLICATION, WITHOUT LONG-TERM CURRENT USE OF INSULIN (HCC): Primary | ICD-10-CM

## 2025-02-04 DIAGNOSIS — C50.912 MALIGNANT NEOPLASM OF LEFT BREAST IN FEMALE, ESTROGEN RECEPTOR POSITIVE, UNSPECIFIED SITE OF BREAST (HCC): ICD-10-CM

## 2025-02-04 DIAGNOSIS — Z79.811 USE OF ANASTROZOLE (ARIMIDEX): ICD-10-CM

## 2025-02-04 DIAGNOSIS — Z17.0 MALIGNANT NEOPLASM OF LEFT BREAST IN FEMALE, ESTROGEN RECEPTOR POSITIVE, UNSPECIFIED SITE OF BREAST (HCC): ICD-10-CM

## 2025-02-04 PROCEDURE — G8400 PT W/DXA NO RESULTS DOC: HCPCS | Performed by: FAMILY MEDICINE

## 2025-02-04 PROCEDURE — 3046F HEMOGLOBIN A1C LEVEL >9.0%: CPT | Performed by: FAMILY MEDICINE

## 2025-02-04 PROCEDURE — 99214 OFFICE O/P EST MOD 30 MIN: CPT | Performed by: FAMILY MEDICINE

## 2025-02-04 PROCEDURE — 1159F MED LIST DOCD IN RCRD: CPT | Performed by: FAMILY MEDICINE

## 2025-02-04 PROCEDURE — 1123F ACP DISCUSS/DSCN MKR DOCD: CPT | Performed by: FAMILY MEDICINE

## 2025-02-04 PROCEDURE — 1090F PRES/ABSN URINE INCON ASSESS: CPT | Performed by: FAMILY MEDICINE

## 2025-02-04 PROCEDURE — 1126F AMNT PAIN NOTED NONE PRSNT: CPT | Performed by: FAMILY MEDICINE

## 2025-02-04 PROCEDURE — 2022F DILAT RTA XM EVC RTNOPTHY: CPT | Performed by: FAMILY MEDICINE

## 2025-02-04 PROCEDURE — G2211 COMPLEX E/M VISIT ADD ON: HCPCS | Performed by: FAMILY MEDICINE

## 2025-02-04 PROCEDURE — G8417 CALC BMI ABV UP PARAM F/U: HCPCS | Performed by: FAMILY MEDICINE

## 2025-02-04 PROCEDURE — G8427 DOCREV CUR MEDS BY ELIG CLIN: HCPCS | Performed by: FAMILY MEDICINE

## 2025-02-04 PROCEDURE — 3017F COLORECTAL CA SCREEN DOC REV: CPT | Performed by: FAMILY MEDICINE

## 2025-02-04 PROCEDURE — 1036F TOBACCO NON-USER: CPT | Performed by: FAMILY MEDICINE

## 2025-02-04 PROCEDURE — 1160F RVW MEDS BY RX/DR IN RCRD: CPT | Performed by: FAMILY MEDICINE

## 2025-02-04 ASSESSMENT — PATIENT HEALTH QUESTIONNAIRE - PHQ9
1. LITTLE INTEREST OR PLEASURE IN DOING THINGS: NOT AT ALL
SUM OF ALL RESPONSES TO PHQ QUESTIONS 1-9: 0
SUM OF ALL RESPONSES TO PHQ QUESTIONS 1-9: 0
2. FEELING DOWN, DEPRESSED OR HOPELESS: NOT AT ALL
SUM OF ALL RESPONSES TO PHQ QUESTIONS 1-9: 0
SUM OF ALL RESPONSES TO PHQ QUESTIONS 1-9: 0
SUM OF ALL RESPONSES TO PHQ9 QUESTIONS 1 & 2: 0

## 2025-02-04 ASSESSMENT — ENCOUNTER SYMPTOMS
RESPIRATORY NEGATIVE: 1
GASTROINTESTINAL NEGATIVE: 1

## 2025-02-04 NOTE — PROGRESS NOTES
Chief Complaint   Patient presents with    Diabetes     6 months     \"Have you been to the ER, urgent care clinic since your last visit?  Hospitalized since your last visit?\"    NO    “Have you seen or consulted any other health care providers outside of [facility] since your last visit?”    NO                   2/4/2025    10:15 AM   PHQ-9    Little interest or pleasure in doing things 0   Feeling down, depressed, or hopeless 0   PHQ-2 Score 0   PHQ-9 Total Score 0           Financial Resource Strain: Low Risk  (8/17/2023)    Overall Financial Resource Strain (CARDIA)     Difficulty of Paying Living Expenses: Not hard at all      Food Insecurity: No Food Insecurity (2/3/2025)    Hunger Vital Sign     Worried About Running Out of Food in the Last Year: Never true     Ran Out of Food in the Last Year: Never true          Health Maintenance Due   Topic Date Due    Diabetic retinal exam  11/21/2023    Breast cancer screen  02/12/2025    Diabetic foot exam  02/20/2025    Diabetic Alb to Cr ratio (uACR) test  02/20/2025    Lipids  02/20/2025       
breakdown.      Toenail Condition: Right toenails are normal.      Left foot:      Protective Sensation: 10 sites tested.  10 sites sensed.      Skin integrity: Skin integrity normal.      Toenail Condition: Left toenails are normal.   Lymphadenopathy:      Cervical: No cervical adenopathy.   Neurological:      General: No focal deficit present.      Gait: Gait normal.         ASSESSMENT & PLAN     1. Type 2 diabetes mellitus without complication, without long-term current use of insulin (HCC)  Controlled, stable and remains at diabetic goal   Currently maintained on regimen of Trulicity 0.75 mg weekly and Metformin  mg 2 tabs qpm w/ dinner  -     Comprehensive Metabolic Panel; Future  -     Lipid Panel; Future  -     Hemoglobin A1C; Future  -     Albumin/Creatinine Ratio, Urine; Future  -     HM DIABETES FOOT EXAM: Normal in office today    2. Malignant neoplasm of left breast in female, estrogen receptor positive, unspecified site of breast (HCC)  Monitored and managed by Oncology. Currently maintained on Arimidex.   -     CBC; Future    3. Use of anastrozole (Arimidex)  Managed by Oncology  -     CBC; Future    4. Mixed hyperlipidemia  Lipids at goal maintained on statin therapy  Continue current regimen of Crestor 10 mg daily and Omega 3  -     Lipid Panel; Future    5. Multinodular goiter  Diagnosed 2021; Low TSH and MNG; Evaluation Endo Dr. Noe Herrera; Thyroid US, Right Nodule;  Thyroid Uptake Scan, \"Hot nodule\"; no biopsy needed; Hot nodule is the cause of mild hyperthyroidism; PCP to monitor TSH and f/u Endo prn only or if TSH goes <0.10 or symptoms of hyperthyroidism. TFT's stable over time and patient remains clinically stable and asymptomatic.  -     TSH; Future      Fasting labs checked today    Reviewed diet, nutrition, exercise, weight management/goals, risk reduction, cardiovascular goals for age and associated health risks.    Reviewed medications, effects, risks, benefits, precautions,

## 2025-02-05 LAB
ALBUMIN SERPL-MCNC: 4.4 G/DL (ref 3.5–5)
ALBUMIN/GLOB SERPL: 1.4 (ref 1.1–2.2)
ALP SERPL-CCNC: 72 U/L (ref 45–117)
ALT SERPL-CCNC: 24 U/L (ref 12–78)
ANION GAP SERPL CALC-SCNC: 7 MMOL/L (ref 2–12)
AST SERPL-CCNC: 22 U/L (ref 15–37)
BILIRUB SERPL-MCNC: 0.6 MG/DL (ref 0.2–1)
BUN SERPL-MCNC: 19 MG/DL (ref 6–20)
BUN/CREAT SERPL: 23 (ref 12–20)
CALCIUM SERPL-MCNC: 10.1 MG/DL (ref 8.5–10.1)
CHLORIDE SERPL-SCNC: 105 MMOL/L (ref 97–108)
CHOLEST SERPL-MCNC: 200 MG/DL
CO2 SERPL-SCNC: 30 MMOL/L (ref 21–32)
CREAT SERPL-MCNC: 0.82 MG/DL (ref 0.55–1.02)
CREAT UR-MCNC: 30.7 MG/DL
ERYTHROCYTE [DISTWIDTH] IN BLOOD BY AUTOMATED COUNT: 12.3 % (ref 11.5–14.5)
EST. AVERAGE GLUCOSE BLD GHB EST-MCNC: 128 MG/DL
GLOBULIN SER CALC-MCNC: 3.2 G/DL (ref 2–4)
GLUCOSE SERPL-MCNC: 117 MG/DL (ref 65–100)
HBA1C MFR BLD: 6.1 % (ref 4–5.6)
HCT VFR BLD AUTO: 45.9 % (ref 35–47)
HDLC SERPL-MCNC: 75 MG/DL
HDLC SERPL: 2.7 (ref 0–5)
HGB BLD-MCNC: 14.7 G/DL (ref 11.5–16)
LDLC SERPL CALC-MCNC: 82.2 MG/DL (ref 0–100)
MCH RBC QN AUTO: 30.2 PG (ref 26–34)
MCHC RBC AUTO-ENTMCNC: 32 G/DL (ref 30–36.5)
MCV RBC AUTO: 94.4 FL (ref 80–99)
MICROALBUMIN UR-MCNC: <0.5 MG/DL
MICROALBUMIN/CREAT UR-RTO: <16 MG/G (ref 0–30)
NRBC # BLD: 0 K/UL (ref 0–0.01)
NRBC BLD-RTO: 0 PER 100 WBC
PLATELET # BLD AUTO: 255 K/UL (ref 150–400)
PMV BLD AUTO: 10.1 FL (ref 8.9–12.9)
POTASSIUM SERPL-SCNC: 4.4 MMOL/L (ref 3.5–5.1)
PROT SERPL-MCNC: 7.6 G/DL (ref 6.4–8.2)
RBC # BLD AUTO: 4.86 M/UL (ref 3.8–5.2)
SODIUM SERPL-SCNC: 142 MMOL/L (ref 136–145)
TRIGL SERPL-MCNC: 214 MG/DL
TSH SERPL DL<=0.05 MIU/L-ACNC: 0.25 UIU/ML (ref 0.36–3.74)
VLDLC SERPL CALC-MCNC: 42.8 MG/DL
WBC # BLD AUTO: 7.3 K/UL (ref 3.6–11)

## 2025-02-13 ENCOUNTER — HOSPITAL ENCOUNTER (OUTPATIENT)
Facility: HOSPITAL | Age: 65
Discharge: HOME | End: 2025-02-16
Attending: INTERNAL MEDICINE
Payer: MEDICARE

## 2025-02-13 VITALS — WEIGHT: 198 LBS | HEIGHT: 66 IN | BODY MASS INDEX: 31.82 KG/M2

## 2025-02-13 DIAGNOSIS — Z85.3 PERSONAL HISTORY OF BREAST CANCER: ICD-10-CM

## 2025-02-13 DIAGNOSIS — C50.912 INFILTRATING DUCTAL CARCINOMA OF LEFT BREAST (HCC): ICD-10-CM

## 2025-02-13 PROCEDURE — G0279 TOMOSYNTHESIS, MAMMO: HCPCS

## 2025-03-03 DIAGNOSIS — C50.912 INFILTRATING DUCTAL CARCINOMA OF LEFT BREAST (HCC): ICD-10-CM

## 2025-03-03 DIAGNOSIS — Z79.811 USE OF ANASTROZOLE (ARIMIDEX): ICD-10-CM

## 2025-03-03 DIAGNOSIS — E11.9 TYPE 2 DIABETES MELLITUS WITHOUT COMPLICATIONS (HCC): ICD-10-CM

## 2025-03-03 RX ORDER — ANASTROZOLE 1 MG/1
1 TABLET ORAL DAILY
Qty: 90 TABLET | Refills: 3 | Status: SHIPPED | OUTPATIENT
Start: 2025-03-03

## 2025-03-04 RX ORDER — METFORMIN HYDROCHLORIDE 500 MG/1
1000 TABLET, EXTENDED RELEASE ORAL
Qty: 180 TABLET | Refills: 1 | Status: SHIPPED | OUTPATIENT
Start: 2025-03-04

## 2025-03-04 NOTE — TELEPHONE ENCOUNTER
PCP: Danya Garrett MD    Last appt: 2/4/2025     Future Appointments   Date Time Provider Department Center   3/10/2025  3:15 PM [facility]   9/2/2025 10:30 AM Peg Thomas, APRN - NP [facility] BS AMB       Requested Prescriptions     Pending Prescriptions Disp Refills    metFORMIN (GLUCOPHAGE-XR) 500 MG extended release tablet [Pharmacy Med Name: METFORMIN HCL ER TABS 500MG] 180 tablet 3     Sig: TAKE 2 TABLETS DAILY WITH BREAKFAST       Prior labs and Blood pressures:  BP Readings from Last 3 Encounters:   02/04/25 122/75   08/08/24 104/65   07/22/24 (!) 148/70     Lab Results   Component Value Date/Time     02/04/2025 11:04 AM    K 4.4 02/04/2025 11:04 AM     02/04/2025 11:04 AM    CO2 30 02/04/2025 11:04 AM    BUN 19 02/04/2025 11:04 AM    GFRAA >60 09/20/2022 08:31 AM     Lab Results   Component Value Date/Time    YVD9RWFT 6.0 03/08/2022 08:55 AM     Lab Results   Component Value Date/Time    CHOL 200 02/04/2025 11:04 AM    HDL 75 02/04/2025 11:04 AM    LDL 82.2 02/04/2025 11:04 AM    LDL 58.6 02/20/2024 10:16 AM    VLDL 42.8 02/04/2025 11:04 AM     No results found for: \"VITD3\"    Lab Results   Component Value Date/Time    TSH 0.25 02/04/2025 11:04 AM

## 2025-03-10 ENCOUNTER — OFFICE VISIT (OUTPATIENT)
Age: 65
End: 2025-03-10
Payer: MEDICARE

## 2025-03-10 VITALS
RESPIRATION RATE: 18 BRPM | DIASTOLIC BLOOD PRESSURE: 79 MMHG | BODY MASS INDEX: 32.28 KG/M2 | OXYGEN SATURATION: 94 % | TEMPERATURE: 98.3 F | WEIGHT: 200 LBS | SYSTOLIC BLOOD PRESSURE: 122 MMHG | HEART RATE: 85 BPM

## 2025-03-10 DIAGNOSIS — Z12.31 BREAST CANCER SCREENING BY MAMMOGRAM: ICD-10-CM

## 2025-03-10 DIAGNOSIS — R22.31 AXILLARY LUMP, RIGHT: ICD-10-CM

## 2025-03-10 DIAGNOSIS — E11.9 TYPE 2 DIABETES MELLITUS WITHOUT COMPLICATION, WITHOUT LONG-TERM CURRENT USE OF INSULIN (HCC): ICD-10-CM

## 2025-03-10 DIAGNOSIS — E78.00 HIGH CHOLESTEROL: ICD-10-CM

## 2025-03-10 DIAGNOSIS — Z98.890 HISTORY OF LUMPECTOMY OF LEFT BREAST: ICD-10-CM

## 2025-03-10 DIAGNOSIS — Z79.811 USE OF ANASTROZOLE (ARIMIDEX): ICD-10-CM

## 2025-03-10 DIAGNOSIS — C50.912 INFILTRATING DUCTAL CARCINOMA OF LEFT BREAST (HCC): Primary | ICD-10-CM

## 2025-03-10 DIAGNOSIS — Z85.3 PERSONAL HISTORY OF BREAST CANCER: ICD-10-CM

## 2025-03-10 DIAGNOSIS — R92.323 SCATTERED FIBROGLANDULAR TISSUE DENSITY OF BOTH BREASTS ON MAMMOGRAPHY: ICD-10-CM

## 2025-03-10 PROCEDURE — 1036F TOBACCO NON-USER: CPT | Performed by: NURSE PRACTITIONER

## 2025-03-10 PROCEDURE — 3044F HG A1C LEVEL LT 7.0%: CPT | Performed by: NURSE PRACTITIONER

## 2025-03-10 PROCEDURE — 3017F COLORECTAL CA SCREEN DOC REV: CPT | Performed by: NURSE PRACTITIONER

## 2025-03-10 PROCEDURE — G8428 CUR MEDS NOT DOCUMENT: HCPCS | Performed by: NURSE PRACTITIONER

## 2025-03-10 PROCEDURE — 99214 OFFICE O/P EST MOD 30 MIN: CPT | Performed by: NURSE PRACTITIONER

## 2025-03-10 PROCEDURE — G8400 PT W/DXA NO RESULTS DOC: HCPCS | Performed by: NURSE PRACTITIONER

## 2025-03-10 PROCEDURE — 2022F DILAT RTA XM EVC RTNOPTHY: CPT | Performed by: NURSE PRACTITIONER

## 2025-03-10 PROCEDURE — 1123F ACP DISCUSS/DSCN MKR DOCD: CPT | Performed by: NURSE PRACTITIONER

## 2025-03-10 PROCEDURE — G8417 CALC BMI ABV UP PARAM F/U: HCPCS | Performed by: NURSE PRACTITIONER

## 2025-03-10 PROCEDURE — 1125F AMNT PAIN NOTED PAIN PRSNT: CPT | Performed by: NURSE PRACTITIONER

## 2025-03-10 PROCEDURE — 1090F PRES/ABSN URINE INCON ASSESS: CPT | Performed by: NURSE PRACTITIONER

## 2025-03-10 NOTE — PROGRESS NOTES
Cancer Colon at [facility]  [address] [place] [address], [address] [place] [id number]  W: [phone]  F: [phone]    Reason for Visit:   Staci García is a 64 y.o. female seen today in office for follow up of Left Breast Cancer.     Treatment History:   Per Surgery Note:  02/02/22: LEFT Breast Bx. PATH: IDC, largest focus measures 10mm, low grade, ER+(99%)/LA+(45%)/HER2-, Ki-67 15%.   Clinical Stage 1  MammaPrint: Low-risk, luminal A-type, +0.208.  03/31/22: LEFT Breast Lumpectomy and SLNBx. - Dr. Varner  PATH: IDC with lobular features, 17mm, grade 1, ER+(99%)/LA+(45%)/HER2-, Ki-67 15%, positive margins, 4 benign LN (0/4). LCIS present  Pathologic Stage: pT1c, pN0  04/14/22: LEFT Breast Lumpectomy re-excision of lateral margin. PATH: Benign breast tissue with extensive fat necrosis and focal usual duct hyperplasia. No evidence of carcinoma.  Completed XRT with Dr. Morales on 6/15/22  Adjuvant hormonal therapy with Anastrozole 7/4/22 - Current     STAGE: 1 T1cN0 ER+ [id number]    History of Present Illness:   Staci García is a 64 y.o. female seen today for office 6 month follow up of Left Breast Cancer with Hx of Lumpectomy 3/22 and Re-Excision in 4/22. She has been on adjuvant hormonal therapy with Anastrozole since 6/22. She reports that she feels well overall today. She is tolerating Anastrozole with some joint pain. She states that she had noticed both generalized joint pain and joint pain in right hip; states she has a history of arthritis and previous pain to the site; had received a cortisone injection and PT for this in the past; worse when sedentary and improves with movement. She has very occasional, mild hot flashes. Her appetite and energy levels are good. She denies fever, chills, mouth sores, cough, SOB, CP, nausea, vomiting, diarrhea, and constipation. She is here alone today.     Past Medical History:   Diagnosis Date    Arthritis Feb 2023    Bone spur of ankle 2018

## 2025-03-10 NOTE — PROGRESS NOTES
Staci García is a 64 y.o. female    Chief Complaint   Patient presents with    Follow-up     Left Breast Cancer       1. Have you been to the ER, urgent care clinic since your last visit?  Hospitalized since your last visit?No    2. Have you seen or consulted any other health care providers outside of the [facility] since your last visit?  Include any pap smears or colon screening. No

## 2025-03-28 DIAGNOSIS — E11.9 DIABETES MELLITUS TYPE 2, NONINSULIN DEPENDENT (HCC): ICD-10-CM

## 2025-03-28 RX ORDER — DULAGLUTIDE 0.75 MG/.5ML
0.75 INJECTION, SOLUTION SUBCUTANEOUS WEEKLY
Qty: 6 ML | Refills: 1 | Status: SHIPPED | OUTPATIENT
Start: 2025-03-28

## 2025-03-28 NOTE — TELEPHONE ENCOUNTER
Last appointment: 2/4/25 MD FRANCIS  Next appointment: 8/15/25 MD FRANCIS  Previous refill encounter(s): 10/7/24 6ml + 1    Requested Prescriptions     Pending Prescriptions Disp Refills    dulaglutide (TRULICITY) 0.75 MG/0.5ML SOAJ SC injection [Pharmacy Med Name: TRULICITY SD PEN 0.5ML 4'S 0.75MG 0.75MG] 6 mL 1     Sig: Inject 0.5 mLs into the skin once a week     For Pharmacy Admin Tracking Only    Program: Medication Refill  CPA in place:    Recommendation Provided To:   Intervention Detail: New Rx: 1, reason: Patient Preference  Intervention Accepted By:   Gap Closed?:    Time Spent (min): 5

## 2025-04-01 ENCOUNTER — RESULTS FOLLOW-UP (OUTPATIENT)
Age: 65
End: 2025-04-01

## 2025-04-01 ENCOUNTER — HOSPITAL ENCOUNTER (OUTPATIENT)
Facility: HOSPITAL | Age: 65
Discharge: HOME | End: 2025-04-04
Payer: MEDICARE

## 2025-04-01 VITALS — WEIGHT: 200 LBS | HEIGHT: 66 IN | BODY MASS INDEX: 32.14 KG/M2

## 2025-04-01 DIAGNOSIS — R22.31 AXILLARY LUMP, RIGHT: ICD-10-CM

## 2025-04-01 PROCEDURE — 76882 US LMTD JT/FCL EVL NVASC XTR: CPT

## 2025-05-20 ENCOUNTER — TELEPHONE (OUTPATIENT)
Age: 65
End: 2025-05-20

## 2025-05-20 NOTE — TELEPHONE ENCOUNTER
Pt called requesting to switch care over to Dr. Gross; advised message would be sent to provider and team will f/u.

## 2025-05-28 DIAGNOSIS — E11.9 TYPE 2 DIABETES MELLITUS WITHOUT COMPLICATIONS (HCC): ICD-10-CM

## 2025-05-28 NOTE — TELEPHONE ENCOUNTER
PT called stating she is out of town and left her metFORMIN (GLUCOPHAGE-XR) at home and is requesting a small refill for a couple of days sent to [address] [place] [address]. PT stated she leaves to go home on 5/30.

## 2025-05-29 RX ORDER — METFORMIN HYDROCHLORIDE 500 MG/1
1000 TABLET, EXTENDED RELEASE ORAL
Qty: 180 TABLET | Refills: 1 | Status: CANCELLED | OUTPATIENT
Start: 2025-05-29

## 2025-06-06 DIAGNOSIS — E78.2 MIXED HYPERLIPIDEMIA: ICD-10-CM

## 2025-06-06 NOTE — TELEPHONE ENCOUNTER
PCP: Danya Garrett MD    Last appt: 2/4/2025     Future Appointments   Date Time Provider Department Center   7/14/2025 12:15 PM [facility] 2 [facility]   8/15/2025  9:20 AM Danya Garrett MD PA[facility] E[facility] DEP   9/2/2025 10:30 AM [facility]   3/5/2026 11:00 AM Kira Martinez,  [facility] BS AMB       Requested Prescriptions     Pending Prescriptions Disp Refills    rosuvastatin (CRESTOR) 10 MG tablet [Pharmacy Med Name: ROSUVASTATIN TABS 10MG] 90 tablet 3     Sig: TAKE 1 TABLET NIGHTLY       Prior labs and Blood pressures:  BP Readings from Last 3 Encounters:   03/10/25 122/79   02/04/25 122/75   08/08/24 104/65     Lab Results   Component Value Date/Time     02/04/2025 11:04 AM    K 4.4 02/04/2025 11:04 AM     02/04/2025 11:04 AM    CO2 30 02/04/2025 11:04 AM    BUN 19 02/04/2025 11:04 AM    GFRAA >60 09/20/2022 08:31 AM     Lab Results   Component Value Date/Time    RKR0ZZQD 6.0 03/08/2022 08:55 AM     Lab Results   Component Value Date/Time    CHOL 200 02/04/2025 11:04 AM    HDL 75 02/04/2025 11:04 AM    LDL 82.2 02/04/2025 11:04 AM    LDL 58.6 02/20/2024 10:16 AM    VLDL 42.8 02/04/2025 11:04 AM     No results found for: \"VITD3\"    Lab Results   Component Value Date/Time    TSH 0.25 02/04/2025 11:04 AM

## 2025-06-07 RX ORDER — ROSUVASTATIN CALCIUM 10 MG/1
10 TABLET, COATED ORAL NIGHTLY
Qty: 90 TABLET | Refills: 1 | Status: SHIPPED | OUTPATIENT
Start: 2025-06-07

## 2025-06-09 ENCOUNTER — OFFICE VISIT (OUTPATIENT)
Age: 65
End: 2025-06-09
Payer: MEDICARE

## 2025-06-09 DIAGNOSIS — F41.9 ANXIETY AND DEPRESSION: Primary | ICD-10-CM

## 2025-06-09 DIAGNOSIS — F32.A ANXIETY AND DEPRESSION: Primary | ICD-10-CM

## 2025-06-09 PROCEDURE — 90791 PSYCH DIAGNOSTIC EVALUATION: CPT | Performed by: SOCIAL WORKER

## 2025-06-09 ASSESSMENT — LIFESTYLE VARIABLES
ALCOHOL_DAYS_PER_WEEK: 5
PAST THREE MONTHS WHAT IS THE LARGEST AMOUNT OF ALCOHOLIC DRINKS YOU HAVE CONSUMED IN ONE DAY: 5
HAVE YOU EVER RECEIVED ALCOHOL OR OTHER DRUG ABUSE TREATMENT: NO

## 2025-06-09 NOTE — PSYCHOTHERAPY
In Office-Initial Evaluation    Time Start:4:00 pm  Time End:5:02 pm    DX:    Diagnosis Orders   1. Anxiety and depression                 Met with Ms. García 6/9/2025 for our first appointment.  This patient requested a therapist to address some issues in her family.  She has participated in counseling in the past but apparently this issue today is not one she has seen a therapist for in the past.    Ms. García reports that they have a 26 year old daughter that was dx in her early 20 with autism.  Prior to the autism, she was diagnosed with ADD, depression and anxiety.  The patient was upset that she nor others were able to dx her daughter earlier in her life.  However, the daughter has participated in mainstream activities and academics.  In fact, she attended [employer] and graduated with honors.  She started struggling her senior year and when she started college at [employer], she began to \"unravel.\"  She was missing classes and then COVID hit.  Once the daughter came home and lived, she started to deteriorate.  She has had several jobs but has been \"let go\" due to some of the social cues that often are problematic for people with Autism.  She is extremely high functioning and was able to get jobs but then over time, her problems socially would be noticed.    At this point, both the patient and her  are very stressed and feel powerless over their daughter's lack of motivation and isolation in the home.  Ms. García reports that her daughter does not communicate much with her and is closer to her .  Ms. García admits that she is more of the \"nag\" due to the fact that her daughter is up all night playing video games and not attempting to look for a job, while the father is \"kinder.\"    Ms. García grew up in [place] with 1 older brother and 2 sisters.  One of her sisters lives here in [place] and they are very close.  Her other sister is in [place] and her older brother lives in

## 2025-06-09 NOTE — PROGRESS NOTES
In Office-Initial Evaluation Billing    Session Time     Start Time:    4:00 pm  Finish Time:  5:02 pm    Total time spent for this encounter:  62 minutes    We did set a follow-up appointment with this clinician.      Return in about 3 weeks (around 6/30/2025).     Therapy Modalities   Building Insight, Cognitive , Client Empowerment, and Stress Management    Assessment / Plan     Staci García is a 64 y.o. female who is alert and oriented X3.  She does not have any suicidal or homicidal ideations.  Patient is not psychotic or delusional.   She has not been psychiatrically admitted to a hospital. Ms. García does have good eye contact during this interview. She is  verbal and engaging.  Patient does have good insight and judgement.  She is  a good candidate for short term therapy to address the above issues.      Psychotherapy Target Symptoms:  anxiety, depressed mood, feelings of helplessness /  hopefulness, and worry.    Assessment:   initial assessment    Plan:  continue psychotherapy    1. Anxiety and depression       --Rhiannon Beltran LCSW on 6/9/2025 at 5:38 PM    An electronic signature was used to authenticate this note.

## 2025-07-01 ENCOUNTER — TELEMEDICINE (OUTPATIENT)
Age: 65
End: 2025-07-01
Payer: MEDICARE

## 2025-07-01 DIAGNOSIS — F41.9 ANXIETY AND DEPRESSION: Primary | ICD-10-CM

## 2025-07-01 DIAGNOSIS — F32.A ANXIETY AND DEPRESSION: Primary | ICD-10-CM

## 2025-07-01 PROCEDURE — 90837 PSYTX W PT 60 MINUTES: CPT | Performed by: SOCIAL WORKER

## 2025-07-01 NOTE — PSYCHOTHERAPY
Virtual Visit (At Home) -Follow Up    DX:    Diagnosis Orders   1. Anxiety and depression             Met with Ms. García today for our follow up appt. minimal progress-this patient reports she and her family went on vacation last week and her daughter was more involved with the family, more \"present,\" but continues to be resistant to improve her quality of life.  Ms. García states that she doesn't really have any consequences that she can use to motivate her daughter to change.  Her biggest concerns for her daughter are as follows:  no job and no motivation to get one, poor hygiene to the point that her room has an odor and sometimes the daughter herself, sleeping most of the day/staying up most of the night, poor nutrition, lack of assistance in the family chores and staying \"holed\" up in her room without much interaction with the family.  This therapist helped brain storm some motivational reasons for the daughter to be more compliant with the household rules.  Ms. García plans to discuss with her  and then hopefully, address with the daughter.  Ms. García feels like she has found negative ways to cope with her stress and would like to find better alternatives.  NO acute symptoms were reported.    We did set a follow-up appointment with this clinician.      Follow-up appt date (if applicable) is:  July 7 @ 3 in office.    --Rhiannon Beltran LCSW on 7/1/2025 at 4:59 PM    An electronic signature was used to authenticate this note.

## 2025-07-01 NOTE — PROGRESS NOTES
Follow Up Virtual Visit Billing    Session Time     Start Time:    2:00 pm  Finish Time:  2:58 pm    Total time spent for this encounter: 58 minutes    We did set a follow-up appointment with this clinician.      Return in about 1 week (around 7/8/2025).     Therapy Modalities   Building Insight, Cognitive , Client Empowerment, Homework / Exercises, Mindfulness, and Stress Management    Assessment / Plan     Psychotherapy Target Symptoms:  anxiety, depressed mood, feelings of helplessness /  hopefulness, and parenting an adult child    Assessment:  minimal progress-this patient reports she and her family went on vacation last week and her daughter was more involved with the family, more \"present,\" but continues to be resistant to improve her quality of life.  Ms. García states that she doesn't really have any consequences that she can use to motivate her daughter to change.  Her biggest concerns for her daughter are as follows:  no job and no motivation to get one, poor hygiene to the point that her room has an odor and sometimes the daughter herself, sleeping most of the day/staying up most of the night, poor nutrition, lack of assistance in the family chores and staying \"holed\" up in her room without much interaction with the family.  This therapist helped brain storm some motivational reasons for the daughter to be more compliant with the household rules.  Ms. García plans to discuss with her  and then hopefully, address with the daughter.  Ms. García feels like she has found negative ways to cope with her stress and would like to find better alternatives.  NO acute symptoms were reported.    Plan:  continue psychotherapy    1. Anxiety and depression       Staci MEET García, was evaluated through a synchronous (real-time) audio-video encounter. The patient (or guardian if applicable) is aware that this is a billable service, which includes applicable co-pays. This Virtual Visit was conducted with

## 2025-07-14 ENCOUNTER — HOSPITAL ENCOUNTER (OUTPATIENT)
Facility: HOSPITAL | Age: 65
Discharge: HOME | End: 2025-07-17
Payer: MEDICARE

## 2025-07-14 ENCOUNTER — TELEPHONE (OUTPATIENT)
Age: 65
End: 2025-07-14

## 2025-07-14 DIAGNOSIS — C50.912 INFILTRATING DUCTAL CARCINOMA OF LEFT BREAST (HCC): ICD-10-CM

## 2025-07-14 PROCEDURE — C8908 MRI W/O FOL W/CONT, BREAST,: HCPCS

## 2025-07-14 PROCEDURE — A9579 GAD-BASE MR CONTRAST NOS,1ML: HCPCS | Performed by: NURSE PRACTITIONER

## 2025-07-14 PROCEDURE — 2580000003 HC RX 258: Performed by: NURSE PRACTITIONER

## 2025-07-14 PROCEDURE — 6360000004 HC RX CONTRAST MEDICATION: Performed by: NURSE PRACTITIONER

## 2025-07-14 RX ORDER — GADOTERIDOL 279.3 MG/ML
18 INJECTION INTRAVENOUS
Status: COMPLETED | OUTPATIENT
Start: 2025-07-14 | End: 2025-07-14

## 2025-07-14 RX ORDER — 0.9 % SODIUM CHLORIDE 0.9 %
100 INTRAVENOUS SOLUTION INTRAVENOUS ONCE
Status: COMPLETED | OUTPATIENT
Start: 2025-07-14 | End: 2025-07-14

## 2025-07-14 RX ADMIN — SODIUM CHLORIDE 100 ML: 9 INJECTION, SOLUTION INTRAVENOUS at 13:21

## 2025-07-14 RX ADMIN — GADOTERIDOL 18 ML: 279.3 INJECTION, SOLUTION INTRAVENOUS at 13:21

## 2025-07-14 NOTE — TELEPHONE ENCOUNTER
[id number]  Call to #[phone], spoke with pt, ID verified x2.  Provider message re recent breast MRI relayed.     Pt stated she has called and selected a new provider to transfer her care. Pt stated she has not received call back with update.     Advised will fwd to transition care team and update them that pt is requesting f/u call to transfer care with Dr. Gibson, understanding verbalized of all.

## 2025-07-16 ENCOUNTER — OFFICE VISIT (OUTPATIENT)
Age: 65
End: 2025-07-16
Payer: MEDICARE

## 2025-07-16 DIAGNOSIS — F32.A ANXIETY AND DEPRESSION: Primary | ICD-10-CM

## 2025-07-16 DIAGNOSIS — F41.9 ANXIETY AND DEPRESSION: Primary | ICD-10-CM

## 2025-07-16 PROCEDURE — 90834 PSYTX W PT 45 MINUTES: CPT | Performed by: SOCIAL WORKER

## 2025-07-16 NOTE — PROGRESS NOTES
In office Follow Up Billing    Session Time     Start Time:    11:10 am  Finish Time:  12:00 pm    Total time spent for this encounter: 50 minutes    We did set a follow-up appointment with this clinician.      Return in about 3 weeks (around 8/6/2025).     Therapy Modalities   Building Self Esteem, Building Insight, Cognitive , Client Empowerment, Mindfulness, Parent Guidance, and Stress Management    Assessment / Plan     Psychotherapy Target Symptoms:  anxiety, depressed mood, poor sleep, and worry    Assessment:  Improving:   this patient reports there have been some positive changes within her household as they relate to her daughter.  The family continues to have their weekly meetings and discuss updates, \"things to do,\" chores, etc.  Her daughter has made some small steps in her personal appearance, amenability to complete some chores, increased communication with patient.  We discussed some other suggestions that may help with managing the daughter's emotional symptoms.  Ms. García also recognizes some personal areas she needs to change in order to be healthier and care for her self.  NO acute symptoms reported at this time.    Plan:  continue psychotherapy    1. Anxiety and depression       --Rhiannon Beltran LCSW on 7/16/2025 at 12:37 PM    An electronic signature was used to authenticate this note.

## 2025-07-16 NOTE — PSYCHOTHERAPY
In office -Follow Up    Time Start:11:10 am  Time End:12:00 pm    DX:    Diagnosis Orders   1. Anxiety and depression             Met with Ms. García today for our follow up appt. Improving:   this patient reports there have been some positive changes within her household as they relate to her daughter.  The family continues to have their weekly meetings and discuss updates, \"things to do,\" chores, etc.  Her daughter has made some small steps in her personal appearance, amenability to complete some chores, increased communication with patient.  We discussed some other suggestions that may help with managing the daughter's emotional symptoms.  Ms. García also recognizes some personal areas she needs to change in order to be healthier and care for her self.  NO acute symptoms reported at this time.    We did set a follow-up appointment with this clinician.      Follow-up appt date (if applicable) is:  August 4 @ 3:00 in office.    Rhiannon Beltran LCSW

## 2025-07-21 ENCOUNTER — TELEPHONE (OUTPATIENT)
Age: 65
End: 2025-07-21

## 2025-07-21 NOTE — TELEPHONE ENCOUNTER
[id number]  Call to #[phone], spoke with pt, ID verified x2.    Pt concerns yesterday she noticed discoloration on Left breast where she had cancer. Pt mentioned discoloration above and under the arm where she has the scar. Pt describes discoloration appears like bruising. In the area of the incision appears \"purplish and veiny\". Denies itching or pain.     Notified pt fwd MCM to [facility] for further guidance. Reassured pt breast MRI negative/good. Advised pt in the meantime to continue to monitor discoloration and report if discoloration is painful, persistent, or worsens. If there's any swelling, heat or signs of infection, any new lumps or nipple changes that weren't there before the MRI. Understanding verbalized of all, pt appreciative of call.

## 2025-07-21 NOTE — TELEPHONE ENCOUNTER
Pt called regarding MCM sent; advised message was forwarded to Breast Surgery. Pt stated provider ordered MRI so she wanted to check with office.   CB#[phone]   PERRL

## 2025-07-23 NOTE — TELEPHONE ENCOUNTER
Patient's MCM reviewed by Breast Provider, Breast NN.  Patient scheduled with Breast Surgery office.

## 2025-08-01 ASSESSMENT — ANXIETY QUESTIONNAIRES
2. NOT BEING ABLE TO STOP OR CONTROL WORRYING: SEVERAL DAYS
IF YOU CHECKED OFF ANY PROBLEMS ON THIS QUESTIONNAIRE, HOW DIFFICULT HAVE THESE PROBLEMS MADE IT FOR YOU TO DO YOUR WORK, TAKE CARE OF THINGS AT HOME, OR GET ALONG WITH OTHER PEOPLE: SOMEWHAT DIFFICULT
5. BEING SO RESTLESS THAT IT IS HARD TO SIT STILL: NOT AT ALL
3. WORRYING TOO MUCH ABOUT DIFFERENT THINGS: MORE THAN HALF THE DAYS
3. WORRYING TOO MUCH ABOUT DIFFERENT THINGS: MORE THAN HALF THE DAYS
4. TROUBLE RELAXING: SEVERAL DAYS
GAD7 TOTAL SCORE: 6
1. FEELING NERVOUS, ANXIOUS, OR ON EDGE: SEVERAL DAYS
6. BECOMING EASILY ANNOYED OR IRRITABLE: SEVERAL DAYS
4. TROUBLE RELAXING: SEVERAL DAYS
7. FEELING AFRAID AS IF SOMETHING AWFUL MIGHT HAPPEN: NOT AT ALL
7. FEELING AFRAID AS IF SOMETHING AWFUL MIGHT HAPPEN: NOT AT ALL
2. NOT BEING ABLE TO STOP OR CONTROL WORRYING: SEVERAL DAYS
6. BECOMING EASILY ANNOYED OR IRRITABLE: SEVERAL DAYS
IF YOU CHECKED OFF ANY PROBLEMS ON THIS QUESTIONNAIRE, HOW DIFFICULT HAVE THESE PROBLEMS MADE IT FOR YOU TO DO YOUR WORK, TAKE CARE OF THINGS AT HOME, OR GET ALONG WITH OTHER PEOPLE: SOMEWHAT DIFFICULT
1. FEELING NERVOUS, ANXIOUS, OR ON EDGE: SEVERAL DAYS
5. BEING SO RESTLESS THAT IT IS HARD TO SIT STILL: NOT AT ALL

## 2025-08-01 ASSESSMENT — PATIENT HEALTH QUESTIONNAIRE - PHQ9
1. LITTLE INTEREST OR PLEASURE IN DOING THINGS: SEVERAL DAYS
3. TROUBLE FALLING OR STAYING ASLEEP: MORE THAN HALF THE DAYS
7. TROUBLE CONCENTRATING ON THINGS, SUCH AS READING THE NEWSPAPER OR WATCHING TELEVISION: SEVERAL DAYS
7. TROUBLE CONCENTRATING ON THINGS, SUCH AS READING THE NEWSPAPER OR WATCHING TELEVISION: SEVERAL DAYS
5. POOR APPETITE OR OVEREATING: SEVERAL DAYS
10. IF YOU CHECKED OFF ANY PROBLEMS, HOW DIFFICULT HAVE THESE PROBLEMS MADE IT FOR YOU TO DO YOUR WORK, TAKE CARE OF THINGS AT HOME, OR GET ALONG WITH OTHER PEOPLE: SOMEWHAT DIFFICULT
8. MOVING OR SPEAKING SO SLOWLY THAT OTHER PEOPLE COULD HAVE NOTICED. OR THE OPPOSITE - BEING SO FIDGETY OR RESTLESS THAT YOU HAVE BEEN MOVING AROUND A LOT MORE THAN USUAL: NOT AT ALL
1. LITTLE INTEREST OR PLEASURE IN DOING THINGS: SEVERAL DAYS
4. FEELING TIRED OR HAVING LITTLE ENERGY: SEVERAL DAYS
9. THOUGHTS THAT YOU WOULD BE BETTER OFF DEAD, OR OF HURTING YOURSELF: NOT AT ALL
SUM OF ALL RESPONSES TO PHQ QUESTIONS 1-9: 8
4. FEELING TIRED OR HAVING LITTLE ENERGY: SEVERAL DAYS
9. THOUGHTS THAT YOU WOULD BE BETTER OFF DEAD, OR OF HURTING YOURSELF: NOT AT ALL
3. TROUBLE FALLING OR STAYING ASLEEP: MORE THAN HALF THE DAYS
10. IF YOU CHECKED OFF ANY PROBLEMS, HOW DIFFICULT HAVE THESE PROBLEMS MADE IT FOR YOU TO DO YOUR WORK, TAKE CARE OF THINGS AT HOME, OR GET ALONG WITH OTHER PEOPLE: SOMEWHAT DIFFICULT
SUM OF ALL RESPONSES TO PHQ QUESTIONS 1-9: 8
8. MOVING OR SPEAKING SO SLOWLY THAT OTHER PEOPLE COULD HAVE NOTICED. OR THE OPPOSITE, BEING SO FIGETY OR RESTLESS THAT YOU HAVE BEEN MOVING AROUND A LOT MORE THAN USUAL: NOT AT ALL
6. FEELING BAD ABOUT YOURSELF - OR THAT YOU ARE A FAILURE OR HAVE LET YOURSELF OR YOUR FAMILY DOWN: SEVERAL DAYS
5. POOR APPETITE OR OVEREATING: SEVERAL DAYS
2. FEELING DOWN, DEPRESSED OR HOPELESS: SEVERAL DAYS
SUM OF ALL RESPONSES TO PHQ QUESTIONS 1-9: 8
2. FEELING DOWN, DEPRESSED OR HOPELESS: SEVERAL DAYS
6. FEELING BAD ABOUT YOURSELF - OR THAT YOU ARE A FAILURE OR HAVE LET YOURSELF OR YOUR FAMILY DOWN: SEVERAL DAYS

## 2025-08-04 ENCOUNTER — OFFICE VISIT (OUTPATIENT)
Age: 65
End: 2025-08-04
Payer: MEDICARE

## 2025-08-04 DIAGNOSIS — F32.A ANXIETY AND DEPRESSION: Primary | ICD-10-CM

## 2025-08-04 DIAGNOSIS — F41.9 ANXIETY AND DEPRESSION: Primary | ICD-10-CM

## 2025-08-04 PROCEDURE — 1036F TOBACCO NON-USER: CPT | Performed by: SOCIAL WORKER

## 2025-08-04 PROCEDURE — 1123F ACP DISCUSS/DSCN MKR DOCD: CPT | Performed by: SOCIAL WORKER

## 2025-08-04 PROCEDURE — 90837 PSYTX W PT 60 MINUTES: CPT | Performed by: SOCIAL WORKER

## 2025-08-13 ENCOUNTER — OFFICE VISIT (OUTPATIENT)
Age: 65
End: 2025-08-13
Payer: MEDICARE

## 2025-08-13 VITALS — BODY MASS INDEX: 32.14 KG/M2 | WEIGHT: 200 LBS | HEIGHT: 66 IN

## 2025-08-13 DIAGNOSIS — Z85.3 PERSONAL HISTORY OF BREAST CANCER: Primary | ICD-10-CM

## 2025-08-13 PROCEDURE — 1090F PRES/ABSN URINE INCON ASSESS: CPT | Performed by: SURGERY

## 2025-08-13 PROCEDURE — 99213 OFFICE O/P EST LOW 20 MIN: CPT | Performed by: SURGERY

## 2025-08-13 PROCEDURE — 1123F ACP DISCUSS/DSCN MKR DOCD: CPT | Performed by: SURGERY

## 2025-08-13 PROCEDURE — G8400 PT W/DXA NO RESULTS DOC: HCPCS | Performed by: SURGERY

## 2025-08-13 PROCEDURE — 3017F COLORECTAL CA SCREEN DOC REV: CPT | Performed by: SURGERY

## 2025-08-13 PROCEDURE — G8427 DOCREV CUR MEDS BY ELIG CLIN: HCPCS | Performed by: SURGERY

## 2025-08-13 PROCEDURE — 1036F TOBACCO NON-USER: CPT | Performed by: SURGERY

## 2025-08-13 PROCEDURE — 1159F MED LIST DOCD IN RCRD: CPT | Performed by: SURGERY

## 2025-08-13 PROCEDURE — 1160F RVW MEDS BY RX/DR IN RCRD: CPT | Performed by: SURGERY

## 2025-08-13 PROCEDURE — G8417 CALC BMI ABV UP PARAM F/U: HCPCS | Performed by: SURGERY

## 2025-08-14 SDOH — HEALTH STABILITY: PHYSICAL HEALTH: ON AVERAGE, HOW MANY DAYS PER WEEK DO YOU ENGAGE IN MODERATE TO STRENUOUS EXERCISE (LIKE A BRISK WALK)?: 5 DAYS

## 2025-08-14 SDOH — HEALTH STABILITY: PHYSICAL HEALTH: ON AVERAGE, HOW MANY MINUTES DO YOU ENGAGE IN EXERCISE AT THIS LEVEL?: 30 MIN

## 2025-08-14 ASSESSMENT — LIFESTYLE VARIABLES
HAVE YOU OR SOMEONE ELSE BEEN INJURED AS A RESULT OF YOUR DRINKING: NO
HOW OFTEN DO YOU HAVE A DRINK CONTAINING ALCOHOL: 5
HOW OFTEN DURING THE LAST YEAR HAVE YOU NEEDED AN ALCOHOLIC DRINK FIRST THING IN THE MORNING TO GET YOURSELF GOING AFTER A NIGHT OF HEAVY DRINKING: NEVER
HOW MANY STANDARD DRINKS CONTAINING ALCOHOL DO YOU HAVE ON A TYPICAL DAY: 1
HAVE YOU OR SOMEONE ELSE BEEN INJURED AS A RESULT OF YOUR DRINKING: NO
HOW OFTEN DURING THE LAST YEAR HAVE YOU BEEN UNABLE TO REMEMBER WHAT HAPPENED THE NIGHT BEFORE BECAUSE YOU HAD BEEN DRINKING: NEVER
HOW MANY STANDARD DRINKS CONTAINING ALCOHOL DO YOU HAVE ON A TYPICAL DAY: 1 OR 2
HOW OFTEN DO YOU HAVE A DRINK CONTAINING ALCOHOL: 4 OR MORE TIMES A WEEK
HOW OFTEN DURING THE LAST YEAR HAVE YOU HAD A FEELING OF GUILT OR REMORSE AFTER DRINKING: MONTHLY
HOW OFTEN DURING THE LAST YEAR HAVE YOU FAILED TO DO WHAT WAS NORMALLY EXPECTED FROM YOU BECAUSE OF DRINKING: LESS THAN MONTHLY
HOW OFTEN DURING THE LAST YEAR HAVE YOU BEEN UNABLE TO REMEMBER WHAT HAPPENED THE NIGHT BEFORE BECAUSE YOU HAD BEEN DRINKING: NEVER
HOW OFTEN DURING THE LAST YEAR HAVE YOU FOUND THAT YOU WERE NOT ABLE TO STOP DRINKING ONCE YOU HAD STARTED: NEVER
HOW OFTEN DURING THE LAST YEAR HAVE YOU FOUND THAT YOU WERE NOT ABLE TO STOP DRINKING ONCE YOU HAD STARTED: NEVER
HOW OFTEN DURING THE LAST YEAR HAVE YOU FAILED TO DO WHAT WAS NORMALLY EXPECTED FROM YOU BECAUSE OF DRINKING: LESS THAN MONTHLY
HOW OFTEN DO YOU HAVE SIX OR MORE DRINKS ON ONE OCCASION: 2
HOW OFTEN DURING THE LAST YEAR HAVE YOU HAD A FEELING OF GUILT OR REMORSE AFTER DRINKING: MONTHLY
HAS A RELATIVE, FRIEND, DOCTOR, OR ANOTHER HEALTH PROFESSIONAL EXPRESSED CONCERN ABOUT YOUR DRINKING OR SUGGESTED YOU CUT DOWN: YES, DURING THE PAST YEAR
HAS A RELATIVE, FRIEND, DOCTOR, OR ANOTHER HEALTH PROFESSIONAL EXPRESSED CONCERN ABOUT YOUR DRINKING OR SUGGESTED YOU CUT DOWN: YES, DURING THE PAST YEAR
HOW OFTEN DURING THE LAST YEAR HAVE YOU NEEDED AN ALCOHOLIC DRINK FIRST THING IN THE MORNING TO GET YOURSELF GOING AFTER A NIGHT OF HEAVY DRINKING: NEVER

## 2025-08-14 ASSESSMENT — PATIENT HEALTH QUESTIONNAIRE - PHQ9
SUM OF ALL RESPONSES TO PHQ QUESTIONS 1-9: 2
SUM OF ALL RESPONSES TO PHQ QUESTIONS 1-9: 2
2. FEELING DOWN, DEPRESSED OR HOPELESS: SEVERAL DAYS
1. LITTLE INTEREST OR PLEASURE IN DOING THINGS: SEVERAL DAYS
SUM OF ALL RESPONSES TO PHQ QUESTIONS 1-9: 2
SUM OF ALL RESPONSES TO PHQ QUESTIONS 1-9: 2

## 2025-08-15 ENCOUNTER — OFFICE VISIT (OUTPATIENT)
Age: 65
End: 2025-08-15
Payer: MEDICARE

## 2025-08-15 VITALS
OXYGEN SATURATION: 96 % | TEMPERATURE: 98.6 F | RESPIRATION RATE: 14 BRPM | DIASTOLIC BLOOD PRESSURE: 60 MMHG | WEIGHT: 197.8 LBS | HEART RATE: 69 BPM | HEIGHT: 66 IN | SYSTOLIC BLOOD PRESSURE: 104 MMHG | BODY MASS INDEX: 31.79 KG/M2

## 2025-08-15 DIAGNOSIS — E04.2 MULTINODULAR GOITER: ICD-10-CM

## 2025-08-15 DIAGNOSIS — C50.912 MALIGNANT NEOPLASM OF LEFT BREAST IN FEMALE, ESTROGEN RECEPTOR POSITIVE, UNSPECIFIED SITE OF BREAST (HCC): ICD-10-CM

## 2025-08-15 DIAGNOSIS — Z00.00 INITIAL MEDICARE ANNUAL WELLNESS VISIT: Primary | ICD-10-CM

## 2025-08-15 DIAGNOSIS — E78.2 MIXED HYPERLIPIDEMIA: ICD-10-CM

## 2025-08-15 DIAGNOSIS — E11.9 TYPE 2 DIABETES MELLITUS WITHOUT COMPLICATION, WITHOUT LONG-TERM CURRENT USE OF INSULIN (HCC): ICD-10-CM

## 2025-08-15 DIAGNOSIS — Z17.0 MALIGNANT NEOPLASM OF LEFT BREAST IN FEMALE, ESTROGEN RECEPTOR POSITIVE, UNSPECIFIED SITE OF BREAST (HCC): ICD-10-CM

## 2025-08-15 LAB — HBA1C MFR BLD: 6.1 %

## 2025-08-15 PROCEDURE — 99214 OFFICE O/P EST MOD 30 MIN: CPT | Performed by: FAMILY MEDICINE

## 2025-08-15 PROCEDURE — 83036 HEMOGLOBIN GLYCOSYLATED A1C: CPT | Performed by: FAMILY MEDICINE

## 2025-08-15 ASSESSMENT — PATIENT HEALTH QUESTIONNAIRE - PHQ9
8. MOVING OR SPEAKING SO SLOWLY THAT OTHER PEOPLE COULD HAVE NOTICED. OR THE OPPOSITE, BEING SO FIGETY OR RESTLESS THAT YOU HAVE BEEN MOVING AROUND A LOT MORE THAN USUAL: NOT AT ALL
2. FEELING DOWN, DEPRESSED OR HOPELESS: SEVERAL DAYS
8. MOVING OR SPEAKING SO SLOWLY THAT OTHER PEOPLE COULD HAVE NOTICED. OR THE OPPOSITE, BEING SO FIGETY OR RESTLESS THAT YOU HAVE BEEN MOVING AROUND A LOT MORE THAN USUAL: NOT AT ALL
4. FEELING TIRED OR HAVING LITTLE ENERGY: SEVERAL DAYS
1. LITTLE INTEREST OR PLEASURE IN DOING THINGS: NOT AT ALL
10. IF YOU CHECKED OFF ANY PROBLEMS, HOW DIFFICULT HAVE THESE PROBLEMS MADE IT FOR YOU TO DO YOUR WORK, TAKE CARE OF THINGS AT HOME, OR GET ALONG WITH OTHER PEOPLE: SOMEWHAT DIFFICULT
SUM OF ALL RESPONSES TO PHQ QUESTIONS 1-9: 5
7. TROUBLE CONCENTRATING ON THINGS, SUCH AS READING THE NEWSPAPER OR WATCHING TELEVISION: NOT AT ALL
10. IF YOU CHECKED OFF ANY PROBLEMS, HOW DIFFICULT HAVE THESE PROBLEMS MADE IT FOR YOU TO DO YOUR WORK, TAKE CARE OF THINGS AT HOME, OR GET ALONG WITH OTHER PEOPLE: SOMEWHAT DIFFICULT
SUM OF ALL RESPONSES TO PHQ QUESTIONS 1-9: 5
9. THOUGHTS THAT YOU WOULD BE BETTER OFF DEAD, OR OF HURTING YOURSELF: NOT AT ALL
1. LITTLE INTEREST OR PLEASURE IN DOING THINGS: SEVERAL DAYS
SUM OF ALL RESPONSES TO PHQ QUESTIONS 1-9: 5
4. FEELING TIRED OR HAVING LITTLE ENERGY: NOT AT ALL
2. FEELING DOWN, DEPRESSED OR HOPELESS: SEVERAL DAYS
3. TROUBLE FALLING OR STAYING ASLEEP: SEVERAL DAYS
2. FEELING DOWN, DEPRESSED OR HOPELESS: SEVERAL DAYS
SUM OF ALL RESPONSES TO PHQ QUESTIONS 1-9: 5
10. IF YOU CHECKED OFF ANY PROBLEMS, HOW DIFFICULT HAVE THESE PROBLEMS MADE IT FOR YOU TO DO YOUR WORK, TAKE CARE OF THINGS AT HOME, OR GET ALONG WITH OTHER PEOPLE: SOMEWHAT DIFFICULT
SUM OF ALL RESPONSES TO PHQ QUESTIONS 1-9: 5
4. FEELING TIRED OR HAVING LITTLE ENERGY: SEVERAL DAYS
6. FEELING BAD ABOUT YOURSELF - OR THAT YOU ARE A FAILURE OR HAVE LET YOURSELF OR YOUR FAMILY DOWN: SEVERAL DAYS
6. FEELING BAD ABOUT YOURSELF - OR THAT YOU ARE A FAILURE OR HAVE LET YOURSELF OR YOUR FAMILY DOWN: SEVERAL DAYS
9. THOUGHTS THAT YOU WOULD BE BETTER OFF DEAD, OR OF HURTING YOURSELF: NOT AT ALL
8. MOVING OR SPEAKING SO SLOWLY THAT OTHER PEOPLE COULD HAVE NOTICED. OR THE OPPOSITE - BEING SO FIDGETY OR RESTLESS THAT YOU HAVE BEEN MOVING AROUND A LOT MORE THAN USUAL: NOT AT ALL
9. THOUGHTS THAT YOU WOULD BE BETTER OFF DEAD, OR OF HURTING YOURSELF: NOT AT ALL
SUM OF ALL RESPONSES TO PHQ QUESTIONS 1-9: 5
1. LITTLE INTEREST OR PLEASURE IN DOING THINGS: NOT AT ALL
5. POOR APPETITE OR OVEREATING: SEVERAL DAYS
3. TROUBLE FALLING OR STAYING ASLEEP: SEVERAL DAYS
7. TROUBLE CONCENTRATING ON THINGS, SUCH AS READING THE NEWSPAPER OR WATCHING TELEVISION: NOT AT ALL
SUM OF ALL RESPONSES TO PHQ QUESTIONS 1-9: 5
5. POOR APPETITE OR OVEREATING: SEVERAL DAYS
3. TROUBLE FALLING OR STAYING ASLEEP: SEVERAL DAYS
6. FEELING BAD ABOUT YOURSELF - OR THAT YOU ARE A FAILURE OR HAVE LET YOURSELF OR YOUR FAMILY DOWN: SEVERAL DAYS
5. POOR APPETITE OR OVEREATING: SEVERAL DAYS
7. TROUBLE CONCENTRATING ON THINGS, SUCH AS READING THE NEWSPAPER OR WATCHING TELEVISION: NOT AT ALL

## 2025-08-15 ASSESSMENT — ANXIETY QUESTIONNAIRES
6. BECOMING EASILY ANNOYED OR IRRITABLE: SEVERAL DAYS
1. FEELING NERVOUS, ANXIOUS, OR ON EDGE: SEVERAL DAYS
4. TROUBLE RELAXING: SEVERAL DAYS
5. BEING SO RESTLESS THAT IT IS HARD TO SIT STILL: NOT AT ALL
2. NOT BEING ABLE TO STOP OR CONTROL WORRYING: SEVERAL DAYS
7. FEELING AFRAID AS IF SOMETHING AWFUL MIGHT HAPPEN: NOT AT ALL
5. BEING SO RESTLESS THAT IT IS HARD TO SIT STILL: NOT AT ALL
3. WORRYING TOO MUCH ABOUT DIFFERENT THINGS: SEVERAL DAYS
4. TROUBLE RELAXING: SEVERAL DAYS
IF YOU CHECKED OFF ANY PROBLEMS ON THIS QUESTIONNAIRE, HOW DIFFICULT HAVE THESE PROBLEMS MADE IT FOR YOU TO DO YOUR WORK, TAKE CARE OF THINGS AT HOME, OR GET ALONG WITH OTHER PEOPLE: SOMEWHAT DIFFICULT
6. BECOMING EASILY ANNOYED OR IRRITABLE: SEVERAL DAYS
7. FEELING AFRAID AS IF SOMETHING AWFUL MIGHT HAPPEN: NOT AT ALL
IF YOU CHECKED OFF ANY PROBLEMS ON THIS QUESTIONNAIRE, HOW DIFFICULT HAVE THESE PROBLEMS MADE IT FOR YOU TO DO YOUR WORK, TAKE CARE OF THINGS AT HOME, OR GET ALONG WITH OTHER PEOPLE: SOMEWHAT DIFFICULT
1. FEELING NERVOUS, ANXIOUS, OR ON EDGE: SEVERAL DAYS
2. NOT BEING ABLE TO STOP OR CONTROL WORRYING: SEVERAL DAYS
GAD7 TOTAL SCORE: 5
3. WORRYING TOO MUCH ABOUT DIFFERENT THINGS: SEVERAL DAYS

## 2025-08-15 ASSESSMENT — ENCOUNTER SYMPTOMS
RESPIRATORY NEGATIVE: 1
EYES NEGATIVE: 1
GASTROINTESTINAL NEGATIVE: 1

## 2025-08-18 ENCOUNTER — OFFICE VISIT (OUTPATIENT)
Age: 65
End: 2025-08-18
Payer: MEDICARE

## 2025-08-18 DIAGNOSIS — F32.A ANXIETY AND DEPRESSION: Primary | ICD-10-CM

## 2025-08-18 DIAGNOSIS — F41.9 ANXIETY AND DEPRESSION: Primary | ICD-10-CM

## 2025-08-18 PROCEDURE — 90837 PSYTX W PT 60 MINUTES: CPT | Performed by: SOCIAL WORKER

## 2025-08-27 ASSESSMENT — PATIENT HEALTH QUESTIONNAIRE - PHQ9
10. IF YOU CHECKED OFF ANY PROBLEMS, HOW DIFFICULT HAVE THESE PROBLEMS MADE IT FOR YOU TO DO YOUR WORK, TAKE CARE OF THINGS AT HOME, OR GET ALONG WITH OTHER PEOPLE: SOMEWHAT DIFFICULT
SUM OF ALL RESPONSES TO PHQ QUESTIONS 1-9: 8
2. FEELING DOWN, DEPRESSED OR HOPELESS: SEVERAL DAYS
3. TROUBLE FALLING OR STAYING ASLEEP: MORE THAN HALF THE DAYS
6. FEELING BAD ABOUT YOURSELF - OR THAT YOU ARE A FAILURE OR HAVE LET YOURSELF OR YOUR FAMILY DOWN: SEVERAL DAYS
SUM OF ALL RESPONSES TO PHQ QUESTIONS 1-9: 8
10. IF YOU CHECKED OFF ANY PROBLEMS, HOW DIFFICULT HAVE THESE PROBLEMS MADE IT FOR YOU TO DO YOUR WORK, TAKE CARE OF THINGS AT HOME, OR GET ALONG WITH OTHER PEOPLE: SOMEWHAT DIFFICULT
9. THOUGHTS THAT YOU WOULD BE BETTER OFF DEAD, OR OF HURTING YOURSELF: NOT AT ALL
5. POOR APPETITE OR OVEREATING: SEVERAL DAYS
8. MOVING OR SPEAKING SO SLOWLY THAT OTHER PEOPLE COULD HAVE NOTICED. OR THE OPPOSITE - BEING SO FIDGETY OR RESTLESS THAT YOU HAVE BEEN MOVING AROUND A LOT MORE THAN USUAL: NOT AT ALL
2. FEELING DOWN, DEPRESSED OR HOPELESS: SEVERAL DAYS
5. POOR APPETITE OR OVEREATING: SEVERAL DAYS
SUM OF ALL RESPONSES TO PHQ QUESTIONS 1-9: 8
6. FEELING BAD ABOUT YOURSELF - OR THAT YOU ARE A FAILURE OR HAVE LET YOURSELF OR YOUR FAMILY DOWN: SEVERAL DAYS
1. LITTLE INTEREST OR PLEASURE IN DOING THINGS: SEVERAL DAYS
4. FEELING TIRED OR HAVING LITTLE ENERGY: SEVERAL DAYS
3. TROUBLE FALLING OR STAYING ASLEEP: MORE THAN HALF THE DAYS
1. LITTLE INTEREST OR PLEASURE IN DOING THINGS: SEVERAL DAYS
7. TROUBLE CONCENTRATING ON THINGS, SUCH AS READING THE NEWSPAPER OR WATCHING TELEVISION: SEVERAL DAYS
8. MOVING OR SPEAKING SO SLOWLY THAT OTHER PEOPLE COULD HAVE NOTICED. OR THE OPPOSITE, BEING SO FIGETY OR RESTLESS THAT YOU HAVE BEEN MOVING AROUND A LOT MORE THAN USUAL: NOT AT ALL
7. TROUBLE CONCENTRATING ON THINGS, SUCH AS READING THE NEWSPAPER OR WATCHING TELEVISION: SEVERAL DAYS
9. THOUGHTS THAT YOU WOULD BE BETTER OFF DEAD, OR OF HURTING YOURSELF: NOT AT ALL
4. FEELING TIRED OR HAVING LITTLE ENERGY: SEVERAL DAYS

## 2025-08-27 ASSESSMENT — ANXIETY QUESTIONNAIRES
2. NOT BEING ABLE TO STOP OR CONTROL WORRYING: SEVERAL DAYS
IF YOU CHECKED OFF ANY PROBLEMS ON THIS QUESTIONNAIRE, HOW DIFFICULT HAVE THESE PROBLEMS MADE IT FOR YOU TO DO YOUR WORK, TAKE CARE OF THINGS AT HOME, OR GET ALONG WITH OTHER PEOPLE: SOMEWHAT DIFFICULT
7. FEELING AFRAID AS IF SOMETHING AWFUL MIGHT HAPPEN: NOT AT ALL
5. BEING SO RESTLESS THAT IT IS HARD TO SIT STILL: NOT AT ALL
3. WORRYING TOO MUCH ABOUT DIFFERENT THINGS: SEVERAL DAYS
7. FEELING AFRAID AS IF SOMETHING AWFUL MIGHT HAPPEN: NOT AT ALL
GAD7 TOTAL SCORE: 4
6. BECOMING EASILY ANNOYED OR IRRITABLE: SEVERAL DAYS
6. BECOMING EASILY ANNOYED OR IRRITABLE: SEVERAL DAYS
2. NOT BEING ABLE TO STOP OR CONTROL WORRYING: SEVERAL DAYS
4. TROUBLE RELAXING: NOT AT ALL
IF YOU CHECKED OFF ANY PROBLEMS ON THIS QUESTIONNAIRE, HOW DIFFICULT HAVE THESE PROBLEMS MADE IT FOR YOU TO DO YOUR WORK, TAKE CARE OF THINGS AT HOME, OR GET ALONG WITH OTHER PEOPLE: SOMEWHAT DIFFICULT
5. BEING SO RESTLESS THAT IT IS HARD TO SIT STILL: NOT AT ALL
1. FEELING NERVOUS, ANXIOUS, OR ON EDGE: SEVERAL DAYS
1. FEELING NERVOUS, ANXIOUS, OR ON EDGE: SEVERAL DAYS
4. TROUBLE RELAXING: NOT AT ALL
3. WORRYING TOO MUCH ABOUT DIFFERENT THINGS: SEVERAL DAYS

## 2025-09-03 ENCOUNTER — OFFICE VISIT (OUTPATIENT)
Age: 65
End: 2025-09-03
Payer: MEDICARE

## 2025-09-03 DIAGNOSIS — F41.9 ANXIETY AND DEPRESSION: Primary | ICD-10-CM

## 2025-09-03 DIAGNOSIS — F32.A ANXIETY AND DEPRESSION: Primary | ICD-10-CM

## 2025-09-03 PROCEDURE — 90837 PSYTX W PT 60 MINUTES: CPT | Performed by: SOCIAL WORKER

## (undated) DEVICE — SUTURE VCRL SZ 3-0 L27IN ABSRB UD L26MM SH 1/2 CIR J416H

## (undated) DEVICE — GARMENT,MEDLINE,DVT,INT,CALF,MED, GEN2: Brand: MEDLINE

## (undated) DEVICE — SUT SLK 2-0SH 30IN BLK --

## (undated) DEVICE — HYPODERMIC SAFETY NEEDLE: Brand: MAGELLAN

## (undated) DEVICE — REM POLYHESIVE ADULT PATIENT RETURN ELECTRODE: Brand: VALLEYLAB

## (undated) DEVICE — INTENDED FOR TISSUE SEPARATION, AND OTHER PROCEDURES THAT REQUIRE A SHARP SURGICAL BLADE TO PUNCTURE OR CUT.: Brand: BARD-PARKER ® CARBON RIB-BACK BLADES

## (undated) DEVICE — GLOVE ORANGE PI 7 1/2   MSG9075

## (undated) DEVICE — GOWN,NON-REINFORCED,XXL: Brand: MEDLINE

## (undated) DEVICE — DERMABOND SKIN ADH 0.7ML -- DERMABOND ADVANCED 12/BX

## (undated) DEVICE — CURVED, SMALL JAW, OPEN SEALER/DIVIDER: Brand: LIGASURE

## (undated) DEVICE — COVER US PRB W5XL96IN LTX W/ GEL

## (undated) DEVICE — SOLUTION IRRIG 1000ML 0.9% SOD CHL USP POUR PLAS BTL

## (undated) DEVICE — SYR 10ML LUER LOK 1/5ML GRAD --

## (undated) DEVICE — HANDLE LT SNAP ON ULT DURABLE LENS FOR TRUMPF ALC DISPOSABLE

## (undated) DEVICE — DRAPE,CHEST,FENES,15X10,STERIL: Brand: MEDLINE

## (undated) DEVICE — BASIN ST MAJOR-NO CAUTERY: Brand: MEDLINE INDUSTRIES, INC.

## (undated) DEVICE — INSULATED BLADE ELECTRODE: Brand: EDGE

## (undated) DEVICE — SUTURE MCRYL SZ 4-0 L27IN ABSRB UD L19MM PS-2 1/2 CIR PRIM Y426H

## (undated) DEVICE — SMOKE EVACUATION TUBING WITH 8 IN INTEGRAL WAND AND SPONGE GUARD: Brand: BUFFALO FILTER

## (undated) DEVICE — TOWEL SURG W17XL27IN STD BLU COT NONFENESTRATED PREWASHED

## (undated) DEVICE — PACK,BASIC,SIRUS,V: Brand: MEDLINE

## (undated) DEVICE — PREP SKN CHLRAPRP APL 26ML STR --

## (undated) DEVICE — PENCIL SMK EVAC L10FT DIA95MM TBNG NONSTICK W ADPT TO 22MM

## (undated) DEVICE — DRAPE,REIN 53X77,STERILE: Brand: MEDLINE

## (undated) DEVICE — SYMMETRY® FORCEPS, DRESSING, SERRATED, 8 IN: Brand: SYMMETRY SURGICAL